# Patient Record
Sex: MALE | Race: OTHER | HISPANIC OR LATINO
[De-identification: names, ages, dates, MRNs, and addresses within clinical notes are randomized per-mention and may not be internally consistent; named-entity substitution may affect disease eponyms.]

---

## 2018-01-02 ENCOUNTER — APPOINTMENT (OUTPATIENT)
Dept: ORTHOPEDIC SURGERY | Facility: CLINIC | Age: 23
End: 2018-01-02
Payer: COMMERCIAL

## 2018-01-02 DIAGNOSIS — Z78.9 OTHER SPECIFIED HEALTH STATUS: ICD-10-CM

## 2018-01-02 DIAGNOSIS — Z87.09 PERSONAL HISTORY OF OTHER DISEASES OF THE RESPIRATORY SYSTEM: ICD-10-CM

## 2018-01-02 PROCEDURE — 99203 OFFICE O/P NEW LOW 30 MIN: CPT

## 2018-01-07 ENCOUNTER — FORM ENCOUNTER (OUTPATIENT)
Age: 23
End: 2018-01-07

## 2018-01-08 ENCOUNTER — APPOINTMENT (OUTPATIENT)
Dept: MRI IMAGING | Facility: CLINIC | Age: 23
End: 2018-01-08
Payer: COMMERCIAL

## 2018-01-08 ENCOUNTER — OUTPATIENT (OUTPATIENT)
Dept: OUTPATIENT SERVICES | Facility: HOSPITAL | Age: 23
LOS: 1 days | End: 2018-01-08
Payer: COMMERCIAL

## 2018-01-08 PROCEDURE — 73721 MRI JNT OF LWR EXTRE W/O DYE: CPT | Mod: 26,LT

## 2018-01-08 PROCEDURE — 73721 MRI JNT OF LWR EXTRE W/O DYE: CPT

## 2018-01-09 ENCOUNTER — APPOINTMENT (OUTPATIENT)
Dept: ORTHOPEDIC SURGERY | Facility: CLINIC | Age: 23
End: 2018-01-09
Payer: COMMERCIAL

## 2018-01-09 PROCEDURE — 99213 OFFICE O/P EST LOW 20 MIN: CPT

## 2018-01-09 RX ORDER — IBUPROFEN 200 MG/1
TABLET, COATED ORAL
Refills: 0 | Status: DISCONTINUED | COMMUNITY
End: 2018-01-09

## 2018-01-23 ENCOUNTER — APPOINTMENT (OUTPATIENT)
Dept: ORTHOPEDIC SURGERY | Facility: CLINIC | Age: 23
End: 2018-01-23
Payer: COMMERCIAL

## 2018-01-23 PROCEDURE — 99214 OFFICE O/P EST MOD 30 MIN: CPT

## 2018-02-06 ENCOUNTER — APPOINTMENT (OUTPATIENT)
Dept: ORTHOPEDIC SURGERY | Facility: CLINIC | Age: 23
End: 2018-02-06
Payer: COMMERCIAL

## 2018-02-06 PROCEDURE — 99213 OFFICE O/P EST LOW 20 MIN: CPT

## 2018-02-27 ENCOUNTER — APPOINTMENT (OUTPATIENT)
Dept: ORTHOPEDIC SURGERY | Facility: CLINIC | Age: 23
End: 2018-02-27
Payer: COMMERCIAL

## 2018-02-27 DIAGNOSIS — S83.412D SPRAIN OF MEDIAL COLLATERAL LIGAMENT OF LEFT KNEE, SUBSEQUENT ENCOUNTER: ICD-10-CM

## 2018-02-27 PROCEDURE — 99213 OFFICE O/P EST LOW 20 MIN: CPT

## 2018-04-02 ENCOUNTER — APPOINTMENT (OUTPATIENT)
Dept: ORTHOPEDIC SURGERY | Facility: CLINIC | Age: 23
End: 2018-04-02

## 2019-10-23 ENCOUNTER — EMERGENCY (EMERGENCY)
Facility: HOSPITAL | Age: 24
LOS: 1 days | Discharge: HOME | End: 2019-10-23
Attending: EMERGENCY MEDICINE | Admitting: EMERGENCY MEDICINE
Payer: SELF-PAY

## 2019-10-23 VITALS
HEART RATE: 84 BPM | DIASTOLIC BLOOD PRESSURE: 67 MMHG | TEMPERATURE: 98 F | RESPIRATION RATE: 16 BRPM | SYSTOLIC BLOOD PRESSURE: 124 MMHG | OXYGEN SATURATION: 100 %

## 2019-10-23 VITALS
SYSTOLIC BLOOD PRESSURE: 133 MMHG | RESPIRATION RATE: 18 BRPM | TEMPERATURE: 98 F | DIASTOLIC BLOOD PRESSURE: 75 MMHG | OXYGEN SATURATION: 100 % | HEART RATE: 100 BPM

## 2019-10-23 LAB
ALBUMIN SERPL ELPH-MCNC: 5 G/DL — SIGNIFICANT CHANGE UP (ref 3.3–5)
ALP SERPL-CCNC: 88 U/L — SIGNIFICANT CHANGE UP (ref 40–120)
ALT FLD-CCNC: 26 U/L — SIGNIFICANT CHANGE UP (ref 4–41)
ANION GAP SERPL CALC-SCNC: 14 MMO/L — SIGNIFICANT CHANGE UP (ref 7–14)
AST SERPL-CCNC: 20 U/L — SIGNIFICANT CHANGE UP (ref 4–40)
BASOPHILS # BLD AUTO: 0.03 K/UL — SIGNIFICANT CHANGE UP (ref 0–0.2)
BASOPHILS NFR BLD AUTO: 0.3 % — SIGNIFICANT CHANGE UP (ref 0–2)
BILIRUB SERPL-MCNC: 0.6 MG/DL — SIGNIFICANT CHANGE UP (ref 0.2–1.2)
BLD GP AB SCN SERPL QL: NEGATIVE — SIGNIFICANT CHANGE UP
BUN SERPL-MCNC: 14 MG/DL — SIGNIFICANT CHANGE UP (ref 7–23)
CALCIUM SERPL-MCNC: 10.2 MG/DL — SIGNIFICANT CHANGE UP (ref 8.4–10.5)
CHLORIDE SERPL-SCNC: 99 MMOL/L — SIGNIFICANT CHANGE UP (ref 98–107)
CO2 SERPL-SCNC: 26 MMOL/L — SIGNIFICANT CHANGE UP (ref 22–31)
CREAT SERPL-MCNC: 0.81 MG/DL — SIGNIFICANT CHANGE UP (ref 0.5–1.3)
EOSINOPHIL # BLD AUTO: 0.18 K/UL — SIGNIFICANT CHANGE UP (ref 0–0.5)
EOSINOPHIL NFR BLD AUTO: 1.8 % — SIGNIFICANT CHANGE UP (ref 0–6)
GLUCOSE SERPL-MCNC: 107 MG/DL — HIGH (ref 70–99)
HCT VFR BLD CALC: 44.5 % — SIGNIFICANT CHANGE UP (ref 39–50)
HGB BLD-MCNC: 14.7 G/DL — SIGNIFICANT CHANGE UP (ref 13–17)
IMM GRANULOCYTES NFR BLD AUTO: 0.3 % — SIGNIFICANT CHANGE UP (ref 0–1.5)
INR BLD: 1.17 — SIGNIFICANT CHANGE UP (ref 0.88–1.17)
LYMPHOCYTES # BLD AUTO: 1.45 K/UL — SIGNIFICANT CHANGE UP (ref 1–3.3)
LYMPHOCYTES # BLD AUTO: 14.1 % — SIGNIFICANT CHANGE UP (ref 13–44)
MCHC RBC-ENTMCNC: 28.9 PG — SIGNIFICANT CHANGE UP (ref 27–34)
MCHC RBC-ENTMCNC: 33 % — SIGNIFICANT CHANGE UP (ref 32–36)
MCV RBC AUTO: 87.6 FL — SIGNIFICANT CHANGE UP (ref 80–100)
MONOCYTES # BLD AUTO: 0.78 K/UL — SIGNIFICANT CHANGE UP (ref 0–0.9)
MONOCYTES NFR BLD AUTO: 7.6 % — SIGNIFICANT CHANGE UP (ref 2–14)
NEUTROPHILS # BLD AUTO: 7.79 K/UL — HIGH (ref 1.8–7.4)
NEUTROPHILS NFR BLD AUTO: 75.9 % — SIGNIFICANT CHANGE UP (ref 43–77)
NRBC # FLD: 0 K/UL — SIGNIFICANT CHANGE UP (ref 0–0)
PLATELET # BLD AUTO: 271 K/UL — SIGNIFICANT CHANGE UP (ref 150–400)
PMV BLD: 10 FL — SIGNIFICANT CHANGE UP (ref 7–13)
POTASSIUM SERPL-MCNC: 4.2 MMOL/L — SIGNIFICANT CHANGE UP (ref 3.5–5.3)
POTASSIUM SERPL-SCNC: 4.2 MMOL/L — SIGNIFICANT CHANGE UP (ref 3.5–5.3)
PROT SERPL-MCNC: 8.8 G/DL — HIGH (ref 6–8.3)
PROTHROM AB SERPL-ACNC: 13.1 SEC — SIGNIFICANT CHANGE UP (ref 9.8–13.1)
RBC # BLD: 5.08 M/UL — SIGNIFICANT CHANGE UP (ref 4.2–5.8)
RBC # FLD: 12.4 % — SIGNIFICANT CHANGE UP (ref 10.3–14.5)
RH IG SCN BLD-IMP: POSITIVE — SIGNIFICANT CHANGE UP
SODIUM SERPL-SCNC: 139 MMOL/L — SIGNIFICANT CHANGE UP (ref 135–145)
WBC # BLD: 10.26 K/UL — SIGNIFICANT CHANGE UP (ref 3.8–10.5)
WBC # FLD AUTO: 10.26 K/UL — SIGNIFICANT CHANGE UP (ref 3.8–10.5)

## 2019-10-23 PROCEDURE — 99245 OFF/OP CONSLTJ NEW/EST HI 55: CPT

## 2019-10-23 PROCEDURE — 71275 CT ANGIOGRAPHY CHEST: CPT | Mod: 26

## 2019-10-23 PROCEDURE — 99218: CPT

## 2019-10-23 PROCEDURE — 71046 X-RAY EXAM CHEST 2 VIEWS: CPT | Mod: 26

## 2019-10-23 RX ORDER — CYCLOBENZAPRINE HYDROCHLORIDE 10 MG/1
1 TABLET, FILM COATED ORAL
Qty: 7 | Refills: 0
Start: 2019-10-23 | End: 2019-10-29

## 2019-10-23 RX ORDER — AZITHROMYCIN 500 MG/1
500 TABLET, FILM COATED ORAL ONCE
Refills: 0 | Status: COMPLETED | OUTPATIENT
Start: 2019-10-23 | End: 2019-10-23

## 2019-10-23 RX ORDER — ALBUTEROL 90 UG/1
1 AEROSOL, METERED ORAL ONCE
Refills: 0 | Status: DISCONTINUED | OUTPATIENT
Start: 2019-10-23 | End: 2019-10-23

## 2019-10-23 RX ORDER — ALBUTEROL 90 UG/1
2 AEROSOL, METERED ORAL
Qty: 1 | Refills: 0
Start: 2019-10-23 | End: 2019-11-21

## 2019-10-23 RX ORDER — ALBUTEROL 90 UG/1
2.5 AEROSOL, METERED ORAL ONCE
Refills: 0 | Status: COMPLETED | OUTPATIENT
Start: 2019-10-23 | End: 2019-10-23

## 2019-10-23 RX ORDER — KETOROLAC TROMETHAMINE 30 MG/ML
30 SYRINGE (ML) INJECTION ONCE
Refills: 0 | Status: DISCONTINUED | OUTPATIENT
Start: 2019-10-23 | End: 2019-10-23

## 2019-10-23 RX ORDER — SODIUM CHLORIDE 9 MG/ML
1000 INJECTION INTRAMUSCULAR; INTRAVENOUS; SUBCUTANEOUS
Refills: 0 | Status: DISCONTINUED | OUTPATIENT
Start: 2019-10-23 | End: 2019-10-28

## 2019-10-23 RX ORDER — KETOROLAC TROMETHAMINE 30 MG/ML
15 SYRINGE (ML) INJECTION ONCE
Refills: 0 | Status: DISCONTINUED | OUTPATIENT
Start: 2019-10-23 | End: 2019-10-28

## 2019-10-23 RX ORDER — CEFTRIAXONE 500 MG/1
1000 INJECTION, POWDER, FOR SOLUTION INTRAMUSCULAR; INTRAVENOUS ONCE
Refills: 0 | Status: COMPLETED | OUTPATIENT
Start: 2019-10-23 | End: 2019-10-23

## 2019-10-23 RX ADMIN — CEFTRIAXONE 100 MILLIGRAM(S): 500 INJECTION, POWDER, FOR SOLUTION INTRAMUSCULAR; INTRAVENOUS at 13:19

## 2019-10-23 RX ADMIN — CEFTRIAXONE 1000 MILLIGRAM(S): 500 INJECTION, POWDER, FOR SOLUTION INTRAMUSCULAR; INTRAVENOUS at 13:49

## 2019-10-23 RX ADMIN — Medication 100 MILLIGRAM(S): at 16:14

## 2019-10-23 RX ADMIN — Medication 30 MILLIGRAM(S): at 10:03

## 2019-10-23 RX ADMIN — ALBUTEROL 2.5 MILLIGRAM(S): 90 AEROSOL, METERED ORAL at 10:03

## 2019-10-23 RX ADMIN — SODIUM CHLORIDE 100 MILLILITER(S): 9 INJECTION INTRAMUSCULAR; INTRAVENOUS; SUBCUTANEOUS at 14:49

## 2019-10-23 RX ADMIN — Medication 30 MILLIGRAM(S): at 10:18

## 2019-10-23 RX ADMIN — AZITHROMYCIN 500 MILLIGRAM(S): 500 TABLET, FILM COATED ORAL at 14:55

## 2019-10-23 NOTE — ED CDU PROVIDER INITIAL DAY NOTE - ATTENDING CONTRIBUTION TO CARE
I, Jennifer Cabot, MD, have performed a history and physical exam of the patient and discussed their management with the ACP.  I reviewed the PA's note and agree with the documented findings and plan of care. My medical decision making and observations are found above.    Cabot: 24M with PMH of spont R PTX s/p multiple procedures in the distant past, here with 2 weeks of cough with yellow/pink sputum and 2d of L lower chest pain.  + subjective fever.  No recent travel or sick contacts.  In the ED, WBC was normal, CXR with possible artifact in the R midlung, CTA chest with no PE but with small area of GGO with central 5mm necrotizing area in the L lower lung periphery, concerning for infection.  Pulmonary was consulted, recs pending.  Ceftriaxone and azithro were given.  Clinda was added to cover for possible CA-MRSA PNA.  The patient will be admitted to the CDU for IV abx for presumed PNA, pulmonary consult, and monitoring. On exam, HDS, NAD, MMM, eyes clear, lungs CTAB, heart sounds normal, abd soft, NT, ND, no CVAT, LEs without edema, wwp, skin normal temperature and color, neuro: alert and oriented, no focal deficits.

## 2019-10-23 NOTE — ED PROVIDER NOTE - ATTENDING CONTRIBUTION TO CARE
Seen and examined, pt. with hx of ? mild bronchospasm and prev. recurrent PTX/blebs, always on R, c/o "tight" L back, rapidly worsened within 1 hr. and since then c/o L CP, pleuritic, SOB, no fever/chills, initially denied cough/phlegm but mother states pt. has had inc. cough x 2wks, pt. endorses taking occ. albuterol MDI w relief for this. Clear lungs, heart reg, abd soft, NT to palp, no edema, NT calves, back with mild reproducible pain paraspinally, NT spine, no CVAT.

## 2019-10-23 NOTE — CONSULT NOTE ADULT - SUBJECTIVE AND OBJECTIVE BOX
Chase Hannon  Internal Medicine, PGY-1  Pager: [facility]: [phone] |[facility]: [phone]    HPI:  24 year old M hx R-sided spontaneous PTX s/p pleurodesis ~10 years ago, presents w/ 2-day hx of L-sided lower thoracic pain worse with inspiration. He states the pain initially started as a muscle-like cramping on the left thorax that became worse with inspiration. Pt took Tramadol with some relief in pain, but is not 100% sure of the name of the medication. Pt came to the hospital because pain was consistent. Described as a sharp, 10/10 pain with no radiation, worse with movement and deep inspiration, alleviated by rest and shallow breathing, associated with cough with slightly blood-stained sputum (droplets). Pt describes initial pain similar to previous PTX pain, but the muscle-like cramp aspect is new. Pt took albuterol inhaler with no improvement in symptoms. No recent travel, no calf pain, no palpitations, no hemoptysis except as mentioned, no fever, chills, no recent sick contacts. Of note, pt works as  and believes he has had asbestos exposure, with intermittent mask availability, and may have strained his back during work.      PAST MEDICAL & SURGICAL HISTORY:  Pneumothorax: x 2 episodes 10 years ago  S/P thoracostomy tube placement      FAMILY HISTORY:  Mother's side: HTN    SOCIAL HISTORY:  Smoking: none  Vaping: none  No other drug use    Allergies    No Known Allergies    Intolerances        HOME MEDICATIONS:  Uses albuterol inhaler PRN       REVIEW OF SYSTEMS:  Constitutional: No fevers or chills. No weight loss. No fatigue or generalised malaise.  Eyes: No itching or discharge from the eyes  ENT: No ear pain. No ear discharge. No nasal congestion. No post nasal drip. No epistaxis. No throat pain. No sore throat. No difficulty swallowing.   CV: No chest pain. No palpitations. No lightheadedness or dizziness.   Resp: + pain-limited dyspnea on exertion. +blood-spotted sputum as described. No dyspnea at rest. No orthopnea. No wheezing. No cough. No stridor. No chest pain with respiration.  GI: No nausea. No vomiting. No diarrhea.  MSK: No joint pain or pain in any extremities  Integumentary: No skin lesions. No pedal edema.  Neurological: No gross motor weakness. No sensory changes.    [ ] All other systems negative  [ ] Unable to assess ROS because ________    OBJECTIVE:  ICU Vital Signs Last 24 Hrs  T(C): 36.8 (23 Oct 2019 13:29), Max: 36.9 (23 Oct 2019 09:00)  T(F): 98.2 (23 Oct 2019 13:29), Max: 98.4 (23 Oct 2019 09:00)  HR: 77 (23 Oct 2019 13:29) (77 - 100)  BP: 106/66 (23 Oct 2019 13:29) (106/66 - 133/75)  BP(mean): --  ABP: --  ABP(mean): --  RR: 14 (23 Oct 2019 13:29) (14 - 18)  SpO2: 98% (23 Oct 2019 13:29) (98% - 100%)        CAPILLARY BLOOD GLUCOSE          PHYSICAL EXAM:  General: Awake, alert, oriented X 3.   HEENT: Atraumatic, normocephalic.    Lymph Nodes: No palpable lymphadenopathy  Neck: supple   Respiratory: Normal chest expansion                    Clear to auscultation b/l, no wheezing  Cardiovascular: regular rate, tachycardic, no murmurs  Abdomen: Soft, non-tender, non-distended.  Extremities: Warm to touch. Peripheral pulse palpable. No pedal edema.   Skin: No rashes or skin lesions  Neurological: Non-focal  Psychiatry: Appropriate mood and affect.    HOSPITAL MEDICATIONS:  MEDICATIONS  (STANDING):  clindamycin IVPB 600 milliGRAM(s) IV Intermittent Once  sodium chloride 0.9%. 1000 milliLiter(s) (100 mL/Hr) IV Continuous <Continuous>    MEDICATIONS  (PRN):      LABS:                        14.7   10.26 )-----------( 271      ( 23 Oct 2019 09:04 )             44.5     10-23    139  |  99  |  14  ----------------------------<  107<H>  4.2   |  26  |  0.81    Ca    10.2      23 Oct 2019 09:04    TPro  8.8<H>  /  Alb  5.0  /  TBili  0.6  /  DBili  x   /  AST  20  /  ALT  26  /  AlkPhos  88  10-23    PT/INR - ( 23 Oct 2019 09:04 )   PT: 13.1 SEC;   INR: 1.17                    MICROBIOLOGY:     RADIOLOGY:  [ ] Reviewed and interpreted by me    < from: Xray Chest 2 Views PA/Lat (10.23.19 @ 09:26) >  Stable mild right apical pleuroparenchymal scarring changes from prior   pleurodesis for spontaneous pneumothorax.     EKG pad/snap casts a small nodular shadow in the medial mid lung in right   perihilar region. Repeat study with all external objects/pads removed   suggested to reassess.    Clear remaining visualized lungs. No pleural effusions or current   pneumothorax.    Cardiac and mediastinal silhouettes within normal limits.    Trachea midline.    Unremarkable osseous structures.    < end of copied text >    < from: CT Angio Chest w/ IV Cont (10.23.19 @ 12:09) >  IMPRESSION: No pulmonary arterial emboli.    Left lower lobe peripheral patchy consolidation with internal necrosis or   cavitation as described above may be of infectious etiology.    < end of copied text >    EKG:  -possible right axis deviation  -no evidence of right-heart strain Chase Hannon  Internal Medicine, PGY-1  Pager: [facility]: [phone] |[facility]: [phone]    HPI:  24 year old M hx R-sided spontaneous PTX s/p pleurodesis ~10 years ago, presents w/ 2-day hx of L-sided lower thoracic pain worse with inspiration. He states the pain initially started as a muscle-like cramping on the left thorax that became worse with inspiration. Pt took Tramadol with some relief in pain, but is not 100% sure of the name of the medication. Pt came to the hospital because pain was consistent. Described as a sharp, 10/10 pain with no radiation, worse with movement and deep inspiration, alleviated by rest and shallow breathing, associated with cough with slightly blood-stained sputum (droplets). Pt describes initial pain similar to previous PTX pain, but the muscle-like cramp aspect is new. Pt took albuterol inhaler with no improvement in symptoms. Pt endorses flu-like symptoms a few days prior to the onset of the current symptoms. +sick contacts.  Has a history of recurrent colds every winter. No recent travel, no calf pain, no palpitations, no hemoptysis except as mentioned, no fever, chills, no recent sick contacts. Of note, pt works as  and believes he has had asbestos exposure, with intermittent mask availability, and may have strained his back during work.     PAST MEDICAL & SURGICAL HISTORY:  Pneumothorax: x 2 episodes 10 years ago  S/P thoracostomy tube placement      FAMILY HISTORY:  Mother's side: HTN  Father: asthma    SOCIAL HISTORY:  EtOH: almost every weekend, liquor, states ~1/2 gallon  Smoking: no tobacco, occasional hookah use  Vaping: none  No other drug use  Sexually active, inconsistent condom use    Allergies    No Known Allergies    Intolerances        HOME MEDICATIONS:  Uses albuterol inhaler PRN       REVIEW OF SYSTEMS:  Constitutional: No fevers or chills. No weight loss. No fatigue or generalised malaise.  Eyes: No itching or discharge from the eyes  ENT: No ear pain. No ear discharge. No nasal congestion. No post nasal drip. No epistaxis. No throat pain. No sore throat. No difficulty swallowing.   CV: No chest pain. No palpitations. No lightheadedness or dizziness.   Resp: + pain-limited dyspnea on exertion. +blood-spotted sputum as described. No dyspnea at rest. No orthopnea. No wheezing. No cough. No stridor. No chest pain with respiration.  GI: No nausea. No vomiting. No diarrhea.  MSK: No joint pain or pain in any extremities  Integumentary: No skin lesions. No pedal edema.  Neurological: No gross motor weakness. No sensory changes.    [ ] All other systems negative  [ ] Unable to assess ROS because ________    OBJECTIVE:  ICU Vital Signs Last 24 Hrs  T(C): 36.8 (23 Oct 2019 13:29), Max: 36.9 (23 Oct 2019 09:00)  T(F): 98.2 (23 Oct 2019 13:29), Max: 98.4 (23 Oct 2019 09:00)  HR: 77 (23 Oct 2019 13:29) (77 - 100)  BP: 106/66 (23 Oct 2019 13:29) (106/66 - 133/75)  BP(mean): --  ABP: --  ABP(mean): --  RR: 14 (23 Oct 2019 13:29) (14 - 18)  SpO2: 98% (23 Oct 2019 13:29) (98% - 100%)        CAPILLARY BLOOD GLUCOSE          PHYSICAL EXAM:  General: Awake, alert, oriented X 3.   HEENT: Atraumatic, normocephalic.    Lymph Nodes: No palpable lymphadenopathy  Neck: supple   Respiratory: Normal chest expansion                    Clear to auscultation b/l, no wheezing  Cardiovascular: regular rate, tachycardic, no murmurs  Abdomen: Soft, non-tender, non-distended.  Extremities: Warm to touch. Peripheral pulse palpable. No pedal edema.   Skin: No rashes or skin lesions  Neurological: Non-focal  Psychiatry: Appropriate mood and affect.    HOSPITAL MEDICATIONS:  MEDICATIONS  (STANDING):  clindamycin IVPB 600 milliGRAM(s) IV Intermittent Once  sodium chloride 0.9%. 1000 milliLiter(s) (100 mL/Hr) IV Continuous <Continuous>    MEDICATIONS  (PRN):      LABS:                        14.7   10.26 )-----------( 271      ( 23 Oct 2019 09:04 )             44.5     10-23    139  |  99  |  14  ----------------------------<  107<H>  4.2   |  26  |  0.81    Ca    10.2      23 Oct 2019 09:04    TPro  8.8<H>  /  Alb  5.0  /  TBili  0.6  /  DBili  x   /  AST  20  /  ALT  26  /  AlkPhos  88  10-23    PT/INR - ( 23 Oct 2019 09:04 )   PT: 13.1 SEC;   INR: 1.17                    MICROBIOLOGY:     RADIOLOGY:  [ ] Reviewed and interpreted by me    < from: Xray Chest 2 Views PA/Lat (10.23.19 @ 09:26) >  Stable mild right apical pleuroparenchymal scarring changes from prior   pleurodesis for spontaneous pneumothorax.     EKG pad/snap casts a small nodular shadow in the medial mid lung in right   perihilar region. Repeat study with all external objects/pads removed   suggested to reassess.    Clear remaining visualized lungs. No pleural effusions or current   pneumothorax.    Cardiac and mediastinal silhouettes within normal limits.    Trachea midline.    Unremarkable osseous structures.    < end of copied text >    < from: CT Angio Chest w/ IV Cont (10.23.19 @ 12:09) >  IMPRESSION: No pulmonary arterial emboli.    Left lower lobe peripheral patchy consolidation with internal necrosis or   cavitation as described above may be of infectious etiology.    < end of copied text >    EKG:  -possible right axis deviation  -no evidence of right-heart strain Chase Hannon  Internal Medicine, PGY-1  Pager: [facility]: [phone] |[facility]: [phone]    HPI:  24 year old M hx R-sided spontaneous PTX s/p VATS pleurodesis ~10 years ago, presents w/ 2-day hx of L-sided lower thoracic pain worse with inspiration. He states the pain initially started as a muscle-like cramping on the left thorax that became worse with inspiration. Pt took Tramadol with some relief in pain, but is not 100% sure of the name of the medication. Pt came to the hospital because pain was consistent. Described as a sharp, 10/10 pain with no radiation, worse with movement and deep inspiration, alleviated by rest and shallow breathing, associated with cough with slightly blood-stained sputum (droplets). Pt describes initial pain similar to previous PTX pain, but the muscle-like cramp aspect is new. Pt took albuterol inhaler with no improvement in symptoms. Pt endorses flu-like symptoms a few days prior to the onset of the current symptoms. +sick contacts.  Has a history of recurrent colds every winter. No recent travel, no calf pain, no palpitations, no hemoptysis except as mentioned, no fever, chills, no recent sick contacts. Of note, pt works as  and believes he has had asbestos exposure, with intermittent mask availability, and may have strained his back during work. Born in . Travelled to [place] one year prior. No recurrent infections. Never been tested for HIV but currently has one long-term partner.     PAST MEDICAL & SURGICAL HISTORY:  Pneumothorax: x 2 episodes 10 years ago  S/P thoracostomy tube placement      FAMILY HISTORY:  Mother's side: HTN  Father: asthma    SOCIAL HISTORY:  EtOH: almost every weekend, "dark" liquor, states ~1/2 gallon  Smoking: no tobacco, occasional hookah use  Vaping: none  No other drug use  Sexually active - 1 partner, inconsistent condom use    Allergies    No Known Allergies    Intolerances        HOME MEDICATIONS:  Uses albuterol inhaler PRN       REVIEW OF SYSTEMS:  Constitutional: No fevers or chills. No weight loss. No fatigue or generalised malaise.  Eyes: No itching or discharge from the eyes  ENT: No ear pain. No ear discharge. No nasal congestion. No post nasal drip. No epistaxis. No throat pain. No sore throat. No difficulty swallowing.   CV: No chest pain. No palpitations. No lightheadedness or dizziness.   Resp: + pain-limited dyspnea on exertion. +blood-spotted sputum as described. No dyspnea at rest. No orthopnea. No wheezing. No cough. No stridor. No chest pain with respiration.  GI: No nausea. No vomiting. No diarrhea.  MSK: No joint pain or pain in any extremities  Integumentary: No skin lesions. No pedal edema.  Neurological: No gross motor weakness. No sensory changes.    [ ] All other systems negative  [ ] Unable to assess ROS because ________    OBJECTIVE:  ICU Vital Signs Last 24 Hrs  T(C): 36.8 (23 Oct 2019 13:29), Max: 36.9 (23 Oct 2019 09:00)  T(F): 98.2 (23 Oct 2019 13:29), Max: 98.4 (23 Oct 2019 09:00)  HR: 77 (23 Oct 2019 13:29) (77 - 100)  BP: 106/66 (23 Oct 2019 13:29) (106/66 - 133/75)  BP(mean): --  ABP: --  ABP(mean): --  RR: 14 (23 Oct 2019 13:29) (14 - 18)  SpO2: 98% (23 Oct 2019 13:29) (98% - 100%)        CAPILLARY BLOOD GLUCOSE          PHYSICAL EXAM:  General: Awake, alert, oriented X 3.   HEENT: Atraumatic, normocephalic.    Lymph Nodes: No palpable lymphadenopathy  Neck: supple   Respiratory: Normal chest expansion                    Clear to auscultation b/l, no wheezing  Cardiovascular: regular rate, tachycardic, no murmurs  Abdomen: Soft, non-tender, non-distended.  Extremities: Warm to touch. Peripheral pulse palpable. No pedal edema.   Skin: No rashes or skin lesions  Neurological: Non-focal  Psychiatry: Appropriate mood and affect.    HOSPITAL MEDICATIONS:  MEDICATIONS  (STANDING):  clindamycin IVPB 600 milliGRAM(s) IV Intermittent Once  sodium chloride 0.9%. 1000 milliLiter(s) (100 mL/Hr) IV Continuous <Continuous>    MEDICATIONS  (PRN):      LABS:                        14.7   10.26 )-----------( 271      ( 23 Oct 2019 09:04 )             44.5     10-23    139  |  99  |  14  ----------------------------<  107<H>  4.2   |  26  |  0.81    Ca    10.2      23 Oct 2019 09:04    TPro  8.8<H>  /  Alb  5.0  /  TBili  0.6  /  DBili  x   /  AST  20  /  ALT  26  /  AlkPhos  88  10-23    PT/INR - ( 23 Oct 2019 09:04 )   PT: 13.1 SEC;   INR: 1.17                    MICROBIOLOGY:     RADIOLOGY:  [ ] Reviewed and interpreted by me    < from: Xray Chest 2 Views PA/Lat (10.23.19 @ 09:26) >  Stable mild right apical pleuroparenchymal scarring changes from prior   pleurodesis for spontaneous pneumothorax.     EKG pad/snap casts a small nodular shadow in the medial mid lung in right   perihilar region. Repeat study with all external objects/pads removed   suggested to reassess.    Clear remaining visualized lungs. No pleural effusions or current   pneumothorax.    Cardiac and mediastinal silhouettes within normal limits.    Trachea midline.    Unremarkable osseous structures.    < end of copied text >    < from: CT Angio Chest w/ IV Cont (10.23.19 @ 12:09) >  IMPRESSION: No pulmonary arterial emboli.    Left lower lobe peripheral patchy consolidation with internal necrosis or   cavitation as described above may be of infectious etiology.    < end of copied text >    EKG:  -possible right axis deviation  -no evidence of right-heart strain

## 2019-10-23 NOTE — ED ADULT NURSE NOTE - OBJECTIVE STATEMENT
Pt received to room 2 c/o L sided pain that is worse on inspiration.  Pt a&o 3 and ambulatory.  Pt states pain and SOB started on Monday and progressively got worse.  Pt breathing even and unlabored with breath sounds clear bilaterally, slightly diminished on L side mid to lower lung. Pt denies fever, chills, urinary frequency. No CVA tenderness. Pt received to room 2 c/o L sided pain that is worse on inspiration.  Pt a&o 3 and ambulatory.  Pt states pain and SOB started on Monday and progressively got worse.  Pt breathing even and unlabored with breath sounds clear bilaterally, slightly diminished on L side mid to lower lung. S1 and S2 auscultated. No CVA tenderness.  Pt denies fever, chills, urinary frequency, and hematuria. PMH of R sided pneumothorax x2.  Vital signs as noted, 20G IV R AC, labs drawn and sent as per MD.  Call bell in reach, comfort measures provided, will continue to monitor for safety and comfort.

## 2019-10-23 NOTE — ED CDU PROVIDER DISPOSITION NOTE - ATTENDING CONTRIBUTION TO CARE
I, Jennifer Cabot, MD, have performed a history and physical exam of the patient and discussed their management with the ACP.  I reviewed the PA's note and agree with the documented findings and plan of care. My medical decision making and observations are found above.    Cabot: 24M with PMH of spont R PTX s/p multiple procedures in the distant past, here with 2 weeks of cough with yellow/pink sputum and 2d of L lower chest pain.  + subjective fever.  No recent travel or sick contacts.  In the ED, WBC was normal, CXR with possible artifact in the R midlung, CTA chest with no PE but with small area of GGO with central 5mm necrotizing area in the L lower lung periphery, concerning for infection.  Pulmonary was consulted, recs pending.  Ceftriaxone and azithro were given.  Clinda was added to cover for possible CA-MRSA PNA.  The patient was admitted to the CDU for IV abx for presumed PNA, pulmonary consult, and monitoring. Pulmonary came to the CDU and evaluated the patient.  They feel that this is a post-viral PNA and recommend sending a sputum culture and discharging the patient now on Augmentin.  The pulmonology attending will follow up the sputum culture and call in bactrim if it grows out MRSA.  On exam, HDS, NAD, MMM, eyes clear, lungs CTAB, heart sounds normal, abd soft, NT, ND, no CVAT, LEs without edema, wwp, skin normal temperature and color, neuro: alert and oriented, no focal deficits.  Will discharge the patient with Augmentin and albuterol.

## 2019-10-23 NOTE — ED CDU PROVIDER INITIAL DAY NOTE - PROGRESS NOTE DETAILS
Cabot: I spoke with the pulmonary attending, Dr. Patel, who feels that this is a post-viral pneumonia. She recommends sending a sputum culture, starting augmentin, and sending with albuterol.  Dr. Patel will follow up the sputum culture and start bactrim if the culture grows out MRSA.  He will see Dr. Patel in the office on Nov 6th at 230pm.

## 2019-10-23 NOTE — CONSULT NOTE ADULT - ATTENDING COMMENTS
24 year old healthy male who presents with acute pleuritic left sided back pain, found to have a left lower lobe pneumonia consolidation on CT chest.   Patient has a history of a pneumothorax s/p VATS pleurodesis 10 years prior in setting of possible trauma after basketball, though some blebs are seen in apices on prior and current imaging. Otherwise, he is healthy but had symptoms of a URI with cough, chills, sputum, sore throat 1 - 2 weeks before that lasted about a week. He started feeling better over the weekend but this Monday (2 days prior) starting feeling a stabbing pain that has gotten progressively worse. He does not have recurrent pneumonias, though he is prone to "colds" in the winter months. He occasionally takes his dad's Ventolin which helps "open" him up. Denies inhaled corticosteroid use.  He received Toradol in the ED which helped his symptoms. Clinical findings consistent with a post-viral pneumonia. He appears well clinically but has pleuritic pain, due to the location of the pneumonia. Can continue NSAIDS prn. Check sputum culture. Can discharge on Augmentin and follow up with me at [address] [place] [address] on 11/6 @ 2:30 PM [phone]. He will need short term repeat imaging to determine duration of antibiotics and ensure resolution.

## 2019-10-23 NOTE — ED CDU PROVIDER DISPOSITION NOTE - CLINICAL COURSE
Cabot: 24M with PMH of spont R PTX s/p multiple procedures in the distant past, here with 2 weeks of cough with yellow/pink sputum and 2d of L lower chest pain.  + subjective fever.  No recent travel or sick contacts.  In the ED, WBC was normal, CXR with possible artifact in the R midlung, CTA chest with no PE but with small area of GGO with central 5mm necrotizing area in the L lower lung periphery, concerning for infection.  Pulmonary was consulted, recs pending.  Ceftriaxone and azithro were given.  Clinda was added to cover for possible CA-MRSA PNA.  The patient was admitted to the CDU for IV abx for presumed PNA, pulmonary consult, and monitoring. Pulmonary came to the CDU and evaluated the patient.  They feel that this is a post-viral PNA and recommend sending a sputum culture and discharging the patient now on Augmentin.  The pulmonology attending will follow up the sputum culture and call in bactrim if it grows out MRSA.  On exam, HDS, NAD, MMM, eyes clear, lungs CTAB, heart sounds normal, abd soft, NT, ND, no CVAT, LEs without edema, wwp, skin normal temperature and color, neuro: alert and oriented, no focal deficits.  Will discharge the patient with Augmentin and albuterol.

## 2019-10-23 NOTE — ED ADULT NURSE REASSESSMENT NOTE - NS ED NURSE REASSESS COMMENT FT1
break RN: pt A+OX4. breathing even and unlabored. denies new/worsening symptoms. report given to CDU JASON Lindsey. awaits transport in nad with family at bedside.

## 2019-10-23 NOTE — CONSULT NOTE ADULT - ASSESSMENT
24 year old M hx spontaneous R-sided PTX requiring pleurodesis ~10 years ago, no other significant PMH, presents with 2-day hx of L-sided lower thoracic/axillary pleuritic pain with muscle-cramp quality. CT scan shows L-sided patchy consolidation.     Plan:  #Pleuritic chest pain  -d-dimer negative  -CTA negative for PE, PTX  -?lung abscess  -please check HIV, IgGs, ESR/CRP,   -sputum culture w/induction if needed  -alpha anti-trypsin   -RVP  - 24 year old M hx spontaneous R-sided PTX requiring pleurodesis ~10 years ago, no other significant PMH, presents with 2-day hx of L-sided lower thoracic/axillary pleuritic pain with muscle-cramp quality. CT scan shows L-sided patchy consolidation. Concern for post-viral pneumonia.     Plan:  #Pleuritic chest pain  -likely post-viral pneumonia based on hx, sx  -plan for 2 weeks of Augmentin   --f/u w/Dr. Patel on 11/06, already scheduled, will determine if longer course of antibiotics needed and plan for repeat CT in 4-6 weeks  -please prescribe Ventolin inhaler before discharge  -please give Toradol x1 for pain relief before discharge

## 2019-10-23 NOTE — ED CDU PROVIDER DISPOSITION NOTE - NSFOLLOWUPINSTRUCTIONS_ED_ALL_ED_FT
You were seen for a lung infection.  We would like you to take Augmentin (an antibiotic) twice daily for 5 days.  You will also have an albuterol inhaler for wheeze and shortness of breath.  You will follow up with the pulmonologist in her office on November 6th at 230pm.      The pulmonologist will call you about your sputum culture results before then if the antibiotics need to be changed.    Please take tylenol and motrin for pain or fever.      Come back to the ED if you develop high fevers, increased chest pain or sputum, or other concerns. You were seen for a lung infection.  We would like you to take Augmentin (an antibiotic) twice daily for 14 days.  You will also have an albuterol inhaler for wheeze and shortness of breath.  You will follow up with the pulmonologist in her office on November 6th at 230pm.      The pulmonologist will call you about your sputum culture results before then if the antibiotics need to be changed.  You will also have a repeat CT of the chest in 4 weeks.    Please take tylenol and motrin for pain or fever.      Come back to the ED if you develop high fevers, increased chest pain or sputum, or other concerns.

## 2019-10-23 NOTE — ED PROVIDER NOTE - CLINICAL SUMMARY MEDICAL DECISION MAKING FREE TEXT BOX
Jennifer: CXR to eval further for PTX.  Pt currently comfortable on r/a with no signs of dyspnea.  less likely PE given PERC neg (although borderline tachycardic and apparent RAD on ECG).  If no PTX, will ddidmer to further risk stratify for PE.  EKG does not indicate ACS-- I don't think troponin indicated.  If eval here does not reveal more urgent pathology, MSK pain also possible.

## 2019-10-23 NOTE — ED ADULT TRIAGE NOTE - CHIEF COMPLAINT QUOTE
Pt reports sob with left flank pain x 3 days. Pt states pain increases with deep breath. Pt does not look tachypneic. Pt with Hx of pneumothorax x 3

## 2019-10-23 NOTE — ED CDU PROVIDER INITIAL DAY NOTE - OBJECTIVE STATEMENT
Cabot: 24M with PMH of spont R PTX s/p multiple procedures in the distant past, here with 2 weeks of cough with yellow/pink sputum and 2d of L lower chest pain.  + subjective fever.  No recent travel or sick contacts.  In the ED, WBC was normal, CXR with possible artifact in the R midlung, CTA chest with no PE but with small area of GGO with central 5mm necrotizing area in the L lower lung periphery, concerning for infection.  Pulmonary was consulted, recs pending.  Ceftriaxone and azithro were given.  Clinda was added to cover for possible CA-MRSA PNA.  The patient will be admitted to the CDU for IV abx for presumed PNA, pulmonary consult, and monitoring. On exam, HDS, NAD, MMM, eyes clear, lungs CTAB, heart sounds normal, abd soft, NT, ND, no CVAT, LEs without edema, wwp, skin normal temperature and color, neuro: alert and oriented, no focal deficits.

## 2019-10-23 NOTE — ED CDU PROVIDER INITIAL DAY NOTE - MEDICAL DECISION MAKING DETAILS
Cabot: 24M with PMH of spont R PTX s/p multiple procedures in the distant past, here with 2 weeks of cough with yellow/pink sputum and 2d of L lower chest pain.  + subjective fever.  CTA chest shows no PE but + small area of GGO with central 5mm necrosis in the L lower lung periphery, concerning for infection.  Ceftriaxone, clinda, and azithro were given. Normal exam.  The patient will be admitted to the CDU for IV abx for presumed PNA, pulmonary consult, and monitoring.

## 2019-10-23 NOTE — ED CDU PROVIDER DISPOSITION NOTE - PATIENT PORTAL LINK FT
You can access the FollowMyHealth Patient Portal offered by [facility] by registering at the following website: [email or web address]. By joining [employer]’s FollowMyHealth portal, you will also be able to view your health information using other applications (apps) compatible with our system.

## 2019-10-23 NOTE — ED PROVIDER NOTE - OBJECTIVE STATEMENT
25 yo M PMH R sided spont PTX s/p mult surgeries apx 10 yrs ago, no other PMH p/w 2 days dyspnea and L axillary area pain that is worse with breathing and better with certain positions.  No pain elsewhere.  No acute cough or fever.  No prior blood clot, no recent surgery or sig injury, no hemop, no LE swelling, no steroid or hormone use.

## 2019-10-24 LAB
GRAM STN SPT: SIGNIFICANT CHANGE UP
SPECIMEN SOURCE: SIGNIFICANT CHANGE UP

## 2019-10-26 LAB — BACTERIA SPT RESP CULT: SIGNIFICANT CHANGE UP

## 2019-10-28 LAB
BACTERIA BLD CULT: SIGNIFICANT CHANGE UP
BACTERIA BLD CULT: SIGNIFICANT CHANGE UP

## 2019-11-06 ENCOUNTER — FORM ENCOUNTER (OUTPATIENT)
Age: 24
End: 2019-11-06

## 2019-11-06 ENCOUNTER — APPOINTMENT (OUTPATIENT)
Dept: PULMONOLOGY | Facility: CLINIC | Age: 24
End: 2019-11-06
Payer: COMMERCIAL

## 2019-11-06 VITALS
DIASTOLIC BLOOD PRESSURE: 85 MMHG | OXYGEN SATURATION: 97 % | BODY MASS INDEX: 25.62 KG/M2 | WEIGHT: 173 LBS | SYSTOLIC BLOOD PRESSURE: 120 MMHG | HEIGHT: 69 IN | RESPIRATION RATE: 15 BRPM | TEMPERATURE: 97.6 F | HEART RATE: 72 BPM

## 2019-11-06 DIAGNOSIS — J18.9 PNEUMONIA, UNSPECIFIED ORGANISM: ICD-10-CM

## 2019-11-06 DIAGNOSIS — J93.83 OTHER PNEUMOTHORAX: ICD-10-CM

## 2019-11-06 PROCEDURE — 99214 OFFICE O/P EST MOD 30 MIN: CPT

## 2019-11-07 ENCOUNTER — APPOINTMENT (OUTPATIENT)
Dept: RADIOLOGY | Facility: CLINIC | Age: 24
End: 2019-11-07
Payer: COMMERCIAL

## 2019-11-07 ENCOUNTER — OUTPATIENT (OUTPATIENT)
Dept: OUTPATIENT SERVICES | Facility: HOSPITAL | Age: 24
LOS: 1 days | End: 2019-11-07
Payer: COMMERCIAL

## 2019-11-07 DIAGNOSIS — Z00.8 ENCOUNTER FOR OTHER GENERAL EXAMINATION: ICD-10-CM

## 2019-11-07 PROCEDURE — 71046 X-RAY EXAM CHEST 2 VIEWS: CPT | Mod: 26

## 2019-11-07 PROCEDURE — 71046 X-RAY EXAM CHEST 2 VIEWS: CPT

## 2019-11-09 RX ORDER — ALBUTEROL SULFATE 90 UG/1
108 (90 BASE) AEROSOL, METERED RESPIRATORY (INHALATION)
Refills: 0 | Status: ACTIVE | COMMUNITY
Start: 2019-11-09

## 2019-12-02 ENCOUNTER — APPOINTMENT (OUTPATIENT)
Dept: CT IMAGING | Facility: CLINIC | Age: 24
End: 2019-12-02

## 2024-06-01 ENCOUNTER — EMERGENCY (EMERGENCY)
Facility: HOSPITAL | Age: 29
LOS: 1 days | Discharge: ANOTHER HOSPITAL | End: 2024-06-01
Attending: EMERGENCY MEDICINE
Payer: COMMERCIAL

## 2024-06-01 VITALS
TEMPERATURE: 98 F | SYSTOLIC BLOOD PRESSURE: 122 MMHG | HEIGHT: 69 IN | RESPIRATION RATE: 18 BRPM | WEIGHT: 166.89 LBS | DIASTOLIC BLOOD PRESSURE: 77 MMHG | OXYGEN SATURATION: 93 % | HEART RATE: 94 BPM

## 2024-06-01 VITALS
DIASTOLIC BLOOD PRESSURE: 58 MMHG | OXYGEN SATURATION: 95 % | TEMPERATURE: 98 F | SYSTOLIC BLOOD PRESSURE: 111 MMHG | HEART RATE: 108 BPM | RESPIRATION RATE: 18 BRPM

## 2024-06-01 LAB
ACETONE SERPL-MCNC: NEGATIVE — SIGNIFICANT CHANGE UP
ALBUMIN SERPL ELPH-MCNC: 4.3 G/DL — SIGNIFICANT CHANGE UP (ref 3.5–5)
ALP SERPL-CCNC: 122 U/L — HIGH (ref 40–120)
ALT FLD-CCNC: 37 U/L DA — SIGNIFICANT CHANGE UP (ref 10–60)
ANION GAP SERPL CALC-SCNC: 7 MMOL/L — SIGNIFICANT CHANGE UP (ref 5–17)
AST SERPL-CCNC: 40 U/L — SIGNIFICANT CHANGE UP (ref 10–40)
BILIRUB SERPL-MCNC: 1 MG/DL — SIGNIFICANT CHANGE UP (ref 0.2–1.2)
BUN SERPL-MCNC: 16 MG/DL — SIGNIFICANT CHANGE UP (ref 7–18)
CALCIUM SERPL-MCNC: 9.5 MG/DL — SIGNIFICANT CHANGE UP (ref 8.4–10.5)
CHLORIDE SERPL-SCNC: 106 MMOL/L — SIGNIFICANT CHANGE UP (ref 96–108)
CK SERPL-CCNC: 45 U/L — SIGNIFICANT CHANGE UP (ref 35–232)
CO2 SERPL-SCNC: 25 MMOL/L — SIGNIFICANT CHANGE UP (ref 22–31)
CREAT SERPL-MCNC: 1.13 MG/DL — SIGNIFICANT CHANGE UP (ref 0.5–1.3)
EGFR: 90 ML/MIN/1.73M2 — SIGNIFICANT CHANGE UP
GLUCOSE SERPL-MCNC: 117 MG/DL — HIGH (ref 70–99)
HCT VFR BLD CALC: 19.3 % — CRITICAL LOW (ref 39–50)
HCT VFR BLD CALC: 20.7 % — CRITICAL LOW (ref 39–50)
HGB BLD-MCNC: 7.3 G/DL — LOW (ref 13–17)
HGB BLD-MCNC: 7.9 G/DL — LOW (ref 13–17)
MAGNESIUM SERPL-MCNC: 2.3 MG/DL — SIGNIFICANT CHANGE UP (ref 1.6–2.6)
MCHC RBC-ENTMCNC: 34.6 PG — HIGH (ref 27–34)
MCHC RBC-ENTMCNC: 34.6 PG — HIGH (ref 27–34)
MCHC RBC-ENTMCNC: 37.8 GM/DL — HIGH (ref 32–36)
MCHC RBC-ENTMCNC: 38.2 GM/DL — HIGH (ref 32–36)
MCV RBC AUTO: 90.8 FL — SIGNIFICANT CHANGE UP (ref 80–100)
MCV RBC AUTO: 91.5 FL — SIGNIFICANT CHANGE UP (ref 80–100)
PLATELET # BLD AUTO: 165 K/UL — SIGNIFICANT CHANGE UP (ref 150–400)
POTASSIUM SERPL-MCNC: 4.1 MMOL/L — SIGNIFICANT CHANGE UP (ref 3.5–5.3)
POTASSIUM SERPL-SCNC: 4.1 MMOL/L — SIGNIFICANT CHANGE UP (ref 3.5–5.3)
PROT SERPL-MCNC: 7.9 G/DL — SIGNIFICANT CHANGE UP (ref 6–8.3)
RBC # BLD: 2.11 M/UL — LOW (ref 4.2–5.8)
RBC # BLD: 2.28 M/UL — LOW (ref 4.2–5.8)
RBC # FLD: 19.9 % — HIGH (ref 10.3–14.5)
RBC # FLD: 20 % — HIGH (ref 10.3–14.5)
SODIUM SERPL-SCNC: 138 MMOL/L — SIGNIFICANT CHANGE UP (ref 135–145)
WBC # BLD: 637.64 K/UL — CRITICAL HIGH (ref 3.8–10.5)
WBC # BLD: 659.06 K/UL — CRITICAL HIGH (ref 3.8–10.5)
WBC # FLD AUTO: 637.64 K/UL — CRITICAL HIGH (ref 3.8–10.5)
WBC # FLD AUTO: 659.06 K/UL — CRITICAL HIGH (ref 3.8–10.5)

## 2024-06-01 PROCEDURE — 96375 TX/PRO/DX INJ NEW DRUG ADDON: CPT

## 2024-06-01 PROCEDURE — 70450 CT HEAD/BRAIN W/O DYE: CPT | Mod: MC

## 2024-06-01 PROCEDURE — 85025 COMPLETE CBC W/AUTO DIFF WBC: CPT

## 2024-06-01 PROCEDURE — 83615 LACTATE (LD) (LDH) ENZYME: CPT

## 2024-06-01 PROCEDURE — 96374 THER/PROPH/DIAG INJ IV PUSH: CPT

## 2024-06-01 PROCEDURE — 99291 CRITICAL CARE FIRST HOUR: CPT

## 2024-06-01 PROCEDURE — 82009 KETONE BODYS QUAL: CPT

## 2024-06-01 PROCEDURE — 36415 COLL VENOUS BLD VENIPUNCTURE: CPT

## 2024-06-01 PROCEDURE — 70450 CT HEAD/BRAIN W/O DYE: CPT | Mod: 26,MC

## 2024-06-01 PROCEDURE — 82550 ASSAY OF CK (CPK): CPT

## 2024-06-01 PROCEDURE — 83735 ASSAY OF MAGNESIUM: CPT

## 2024-06-01 PROCEDURE — 80053 COMPREHEN METABOLIC PANEL: CPT

## 2024-06-01 PROCEDURE — 99291 CRITICAL CARE FIRST HOUR: CPT | Mod: 25

## 2024-06-01 RX ORDER — MECLIZINE HCL 12.5 MG
12.5 TABLET ORAL ONCE
Refills: 0 | Status: COMPLETED | OUTPATIENT
Start: 2024-06-01 | End: 2024-06-01

## 2024-06-01 RX ORDER — SODIUM CHLORIDE 9 MG/ML
2000 INJECTION INTRAMUSCULAR; INTRAVENOUS; SUBCUTANEOUS ONCE
Refills: 0 | Status: COMPLETED | OUTPATIENT
Start: 2024-06-01 | End: 2024-06-01

## 2024-06-01 RX ORDER — METOCLOPRAMIDE HCL 10 MG
10 TABLET ORAL ONCE
Refills: 0 | Status: COMPLETED | OUTPATIENT
Start: 2024-06-01 | End: 2024-06-01

## 2024-06-01 RX ORDER — ONDANSETRON 8 MG/1
4 TABLET, FILM COATED ORAL ONCE
Refills: 0 | Status: COMPLETED | OUTPATIENT
Start: 2024-06-01 | End: 2024-06-01

## 2024-06-01 RX ADMIN — Medication 104 MILLIGRAM(S): at 16:34

## 2024-06-01 RX ADMIN — ONDANSETRON 4 MILLIGRAM(S): 8 TABLET, FILM COATED ORAL at 19:01

## 2024-06-01 RX ADMIN — ONDANSETRON 4 MILLIGRAM(S): 8 TABLET, FILM COATED ORAL at 23:16

## 2024-06-01 RX ADMIN — Medication 12.5 MILLIGRAM(S): at 16:35

## 2024-06-01 RX ADMIN — SODIUM CHLORIDE 2000 MILLILITER(S): 9 INJECTION INTRAMUSCULAR; INTRAVENOUS; SUBCUTANEOUS at 16:34

## 2024-06-01 NOTE — ED PROVIDER NOTE - CLINICAL SUMMARY MEDICAL DECISION MAKING FREE TEXT BOX
29-year-old male with complaint of dizziness, vertigo with head movement, vomiting, concern for benign positional vertigo, will get labs, give medications and reassess

## 2024-06-01 NOTE — ED PROVIDER NOTE - PROGRESS NOTE DETAILS
labs explained to patient   patient with significant leukocytosis, abnormal blood cells appearance, concern for acute leukemia, patient will have to be transferred for leuko-pheresis and heme-onc evaluation  will get CT head to r/o thrombosis

## 2024-06-01 NOTE — ED ADULT NURSE NOTE - NS_NURSE_UNIT_LOCATION_ED_ALL_ED
Test Date:    2019-02-26               Test Time:    18:25:05

Technician:   MERCY                                    

                                                     

MEASUREMENT RESULTS:                                       

Intervals:                                           

Rate:         85                                     

KY:           124                                    

QRSD:         82                                     

QT:           360                                    

QTc:          428                                    

Axis:                                                

P:            54                                     

KY:           124                                    

QRS:          31                                     

T:            28                                     

                                                     

INTERPRETIVE STATEMENTS:                                       

                                                     

Normal sinus rhythm

Normal ECG

No previous ECG available for comparison



Electronically Signed On 02-27-19 06:55:58 CST by Gelacio Mejia
Medical

## 2024-06-01 NOTE — ED PROVIDER NOTE - OBJECTIVE STATEMENT
29-year-old male with history of spontaneous pneumothorax, asthma complaining of dizziness, vertigo with head movement since Thursday afternoon after using albuterol, associated with vomiting x 3 today, ataxia.  Patient denies recent ear infection, cold, fever

## 2024-06-01 NOTE — ED PROVIDER NOTE - SECONDARY DIAGNOSIS.
Gave patient depo provera injection. Patient tolerated well. Patient will return in 12 weeks.    REASON FOR CONSULTATION/CC: Acute metabolic encephalopathy      Consult at request of Alta Buchanan DO     PCP: Bhaskar Cain MD      No chief complaint on file. HISTORY OF PRESENT ILLNESS: Miri Lambert is a 68y.o. year old male admitted for AAA repair. Patient is currently in the CVU and has had protracted course of severe encephalopathy. During my visit patient is actually pretty stable and coherent. He is conversant mildly disoriented but pleasant. He is he has no complaints at this time. He states he has started to have bowel movements. He is happy that his mood is improving. Patient was apparently seen by psychiatry today restarted some of his home meds. Past Medical History:   Diagnosis Date    AAA (abdominal aortic aneurysm)     Anxiety     Depression     Hyperlipidemia     Hypertension     Hypothyroidism     Iliac artery stenosis, left ([address] 09/16/2021         Past Surgical History:   Procedure Laterality Date    ABDOMINAL AORTIC ANEURYSM REPAIR N/A 1/31/2023    DIRECT REPAIR OF INFRARENAL ABDOMINAL AORTIC ANEURYSM performed by Alta Buchanan DO at [place] 05/13/2021    EXAM UNDER ANESTHESIA, BOTOX INJECTION ANAL FISSURE performed by Chandrika Jacome MD at [address] [place] [address]  09/12/2014    Dr Lesley Mcclain / Patrick Mcmahan HISTORY  01/29/2015    neck cyst excision       Family Hx  family history includes Heart Attack in his mother; Heart Disease in his mother; High Cholesterol in his mother. He was adopted. Social Hx   reports that he has been smoking cigarettes. He has a 26.50 pack-year smoking history.  He has never used smokeless tobacco.    Scheduled Meds:   bisacodyl  10 mg Rectal Daily    LORazepam  0.5 mg Oral BID    [START ON 2/4/2023] escitalopram  20 mg Oral Daily    mirtazapine  30 mg Oral Nightly    metoprolol tartrate  25 mg Oral BID    gabapentin  300 mg Oral TID    amLODIPine  10 mg Oral Daily    amiodarone  400 mg Oral BID    Followed by    Mushtaq Dick ON 2/10/2023] amiodarone  200 mg Oral Daily    enoxaparin Sodium  30 mg SubCUTAneous Daily    famotidine (PEPCID) injection  10 mg IntraVENous BID    [START ON 2/4/2023] Levothyroxine Sodium  50 mcg IntraVENous Daily    sodium chloride flush  5-40 mL IntraVENous 2 times per day    nicotine  1 patch TransDERmal Daily       Continuous Infusions:   sodium chloride      dexmedetomidine 1.1 mcg/kg/hr (02/03/23 1230)    sodium chloride Stopped (02/02/23 1143)    phenylephrine (JAIME-SYNEPHRINE) 50 mg/250 mL infusion 10 mcg/min (02/01/23 0600)       PRN Meds:  QUEtiapine, polyvinyl alcohol-povidone, sodium chloride, HYDROmorphone **OR** HYDROmorphone, metoprolol, hydrALAZINE, perflutren lipid microspheres, phenol, sodium chloride flush, sodium chloride, ondansetron **OR** ondansetron    ALLERGIES:  Patient is allergic to lidocaine. REVIEW OF SYSTEMS:  Constitutional: Negative for fever  HENT: Negative for sore throat  Eyes: Negative for redness   Respiratory: Negative for dyspnea, cough  Cardiovascular: Negative for chest pain  Gastrointestinal: Negative for vomiting, diarrhea   Genitourinary: Negative for hematuria   Musculoskeletal: Negative for arthralgias   Skin: Negative for rash  Neurological: Negative for syncope  Hematological: Negative for adenopathy  Psychiatric/Behavorial: Negative for anxiety    Objective:   PHYSICAL EXAM:  Blood pressure 124/77, pulse 73, temperature 98.9 °F (37.2 °C), temperature source Axillary, resp. rate 26, height 5' 10\" (1.778 m), weight 207 lb 14.3 oz (94.3 kg), SpO2 95 %.'  Gen:  No acute distress. Eyes: PERRL. Anicteric sclera. No conjunctival injection. ENT: No discharge. Posterior oropharynx clear. External appearance of ears and nose normal.  Neck: Trachea midline. No mass   Resp:  No crackles. No wheezes. No rhonchi. No dullness on percussion. CV: Regular rate. Regular rhythm. No murmur or rub. No edema. GI: Soft, Non-tender. Non-distended. +BS  Skin: Warm, dry, w/o erythema. Lymph: No cervical or supraclavicular LAD. M/S: No cyanosis. No clubbing. Neuro:  no focal neurologic deficit. Moves all extremities  Psych: Awake and alert, Oriented . Mood stable. Data Reviewed:   LABS:  CBC:   Recent Labs     02/01/23  0400 02/02/23  0422 02/03/23  0404   WBC 11.7* 13.9* 11.7*   HGB 8.1* 7.4* 7.5*   HCT 25.3* 23.4* 23.2*   MCV 91.4 91.6 91.1   * 104* 86*     BMP:   Recent Labs     02/01/23  0400 02/02/23  0422 02/03/23  0404   * 148* 147*   K 4.5 3.7 4.1   * 113* 111*   CO2 23 23 22   BUN 24* 26* 28*   CREATININE 1.9* 1.5* 1.3     LIVER PROFILE: No results for input(s): AST, ALT, LIPASE, BILIDIR, BILITOT, ALKPHOS in the last 72 hours. Invalid input(s): AMYLASE,  ALB  PT/INR:   Recent Labs     01/31/23  1548 02/01/23  0400   PROTIME 17.3* 17.8*   INR 1.42* 1.47*     APTT:   Recent Labs     01/31/23  1548 02/01/23  0400   APTT 30.0 33.2     UA:No results for input(s): NITRITE, COLORU, PHUR, LABCAST, WBCUA, RBCUA, MUCUS, TRICHOMONAS, YEAST, BACTERIA, CLARITYU, SPECGRAV, LEUKOCYTESUR, UROBILINOGEN, BILIRUBINUR, BLOODU, GLUCOSEU, AMORPHOUS in the last 72 hours. Invalid input(s): [place]  Recent Labs     02/03/23  0915   PHART 7.466*   IGE3KUK 32.8*   PO2ART 72.9*            Radiology Review:  Pertinent images / reports were reviewed as a part of this visit. Access  CVC   Arterial          PICC             CVC        CVC Triple Lumen 01/31/23 (Active)   Central Line Being Utilized Yes 02/03/23 1000   Criteria for Appropriate Use Hemodynamically unstable, requiring monitoring lines, vasopressors, or volume resuscitation 02/03/23 1000   Site Assessment Dry; Intact 02/03/23 1000   Phlebitis Assessment No symptoms 02/03/23 1000   Infiltration Assessment 0 02/03/23 1000   Color/Movement/Sensation Capillary refill less than 3 sec 02/03/23 1000   Proximal Lumen Color/Status Infusing 02/03/23 1000   Medial Lumen Status Infusing 02/03/23 1000   Distal Lumen Color/Status Transduced 02/03/23 1000   Line Care Connections checked and tightened 02/03/23 1000   Alcohol Cap Used Yes 02/03/23 1000   Date of Last Dressing Change 01/31/23 02/03/23 1000   Dressing Type Transparent; Antimicrobial 02/03/23 1000   Dressing Status Dry; Intact; Old drainage noted 02/03/23 1000   Number of days: 3            Gao  Urinary Catheter 01/31/23 2 Way (Active)   Catheter Indications Perioperative use for selected surgical procedures 02/03/23 0800   Site Assessment No urethral drainage 02/03/23 0800   Urine Color [place] 02/03/23 1200   Urine Appearance Clear 02/03/23 0800   Collection Container Standard 02/03/23 0800   Securement Method Other (Comment) 02/03/23 0800   Catheter Care  Other (comment) 02/03/23 0800   Catheter Best Practices  Drainage tube clipped to bed;Catheter secured to thigh; Tamper seal intact; Bag below bladder;Bag not on floor; Lack of dependent loop in tubing;Drainage bag less than half full 02/03/23 0800   Status Draining;Patent 02/03/23 0800   Output (mL) 150 mL 02/03/23 1200   Number of days: 3     Chest X-Ray    Interpreted by: Me    View: Portable chest xray    Findings: Normal heart size, Normal lungs, Normal mediastinum      Assessment/Plan:     Acute metabolic encephalopathy  -Seems mostly clear during my visit though may be waxing and waning in nature  -Psychiatry following restarted home medications    Centrilobular emphysema  -Has have a history of smoking  -Not in exacerbation appears to be comfortable  -But will provide DuoNebs as needed       This note was transcribed using [id number] [employer]. Please disregard any translational errors.     Thank you for the consult    [address]  Pulmonary, Sleep and Critical Care Medicine Anemia

## 2024-06-01 NOTE — ED ADULT TRIAGE NOTE - WEIGHT METHOD
Speech-Language Pathology Treatment Note    Today's date: 2019  Patient name: Linda Beatty  : 2013  MRN: [id number]  Referring provider: Ran Stanford MD  Dx:   No diagnosis found  Medical History significant for:   Past Medical History:   Diagnosis Date    Congenital heart defect     Heart murmur     Lazy eye of left side     Tongue tied      Flowsheet:  Start Time: 0330  Stop Time: 0400  Total time in clinic (min): 30 minutes    Subjective: Patient arrived on time to today's session with his dakota and attended the session yanci  Very frequent head rolling was observed again during today's session and brought up to pt's dakota who confirmed that both her and his teacher have been noticing it too  She reports the rapid head rolling occurred within the past month  Pt's dakota reported pt had limb spasms a while ago during the night (per chart review incident occurred on ) and was concerned about seizure activity; it was determined on  that pt's EEG indicated no incidence of seizures  Head rolling appeared to occur after this incident  Pt's dakota will follow up with PCP regarding head rolling  Also per chart review, regarding his PDA closure procedure, "Isaura Menendez is scheduled for Pre-op and Clinical Visit with Dr Ventura Childers on 2019 at 12:00pm   Also, scheduled for his PDA Procedure with Dr Ventura Childers on 2019 time TBA "    Objective:  1  Pt will reduce the phonological process of fronting by producing back sounds (/k, g, ?, ?, ?/) in 80% of opportunities  July: CV /k/ 81% imitation, initial /k/ 40% mod-max, final VC 20% imitation imitated  August: initial /k/ 20% yanci to 77% min-mod cue  final /k/ yanci/imitated 100%  CV words imitated 50%  September: initial /k/ 77% mod cues  Initial /g/ imitated 2/2 mod cues  Initial /sh/ imitated 2/2 with mod cues   10/3: initial /sh/ pt able to produce /sh/ at beginning of word with mod-max cues in 82% of trials but still fronting (I e  "sh-tip" for "ship")  10/22: produced /k/ and /g/ fronting minimal pairs with 20% accuracy, self correct x1  10/31:  Initial /k/ @ 10% acc through imitation with no increase despite cues  Final /k/ @ 40% with imitation increased to 60% with additional cues  Initial /g/ @ 40% with imitation and no increase in acc with additional cues  Final /g/ @ 80% acc through imitation with increase to 100% with min cues for placement   /SH/ in isolation with model @ 0% acc  11/5: initial /k/ imitated @30% with max cues (1 accurate production and 2 production in broken words), final /k/ imitated 2/3 trials increasing to 3/3 with mod cues     2  Pt will reduce the phonological process of final consonant deletion by producing final sounds in words @ 80% acc  Il[place]  July:  43-80% yanci/imitation  August: final /k/ in words 100%, final /p, t, m, k/ @ 90% acc  In words and 75% in phrases  September: final /d/ @ 10% with MAX cues  Produce final soudns 75% imitation/yanci  With independent productions very frequently putting the wrong sound on the end of the word ( hat was hap) or adding additional vowel onto end of his words ( pop was pop-uh)  Produce  minimal pairs /k/ and /t/ 3/8 with max cues  Pt able to ID minimal pairs with 100% acc  10/3: Produced final sounds of target words in 5/7 trials in imitation and with mod cues  10/17 Produced final consonant of target words @100% imitated with min cue, produced the correct FC @52%  Produced /p for m/, /t for tch/, and had incorrect final /s/ productions  10/22: produced produced final consonants /m/ and /p/ from final consonant deletion minimal pairs in 100% of trials, self corrected x1  10/31:  During structured task FCD x4  11/5: FCD 6x, monitored last 10 minutes    Assessment: Pt worked very hard to produce /k/ in the initial position and had more success with /k/ in the final position   Pt with FCD during conversation and able to add final consonant when told that he "missed the final sound "    Plan:  Recommendations: Speech/Language Therapy  Frequency:2x weekly  Duration:Other 12 [id number]    Homework:  final /k/ words    Intervention Cycle:  Intrvention certification WCJ  Intervention certification NS:    Visit: Visit:  actual

## 2024-06-01 NOTE — ED ADULT NURSE NOTE - NSFALLUNIVINTERV_ED_ALL_ED
Bed/Stretcher in lowest position, wheels locked, appropriate side rails in place/Call bell, personal items and telephone in reach/Instruct patient to call for assistance before getting out of bed/chair/stretcher/Non-slip footwear applied when patient is off stretcher/[place] to call system/Physically safe environment - no spills, clutter or unnecessary equipment/Purposeful proactive rounding/Room/bathroom lighting operational, light cord in reach

## 2024-06-02 ENCOUNTER — RESULT REVIEW (OUTPATIENT)
Age: 29
End: 2024-06-02

## 2024-06-02 ENCOUNTER — INPATIENT (INPATIENT)
Facility: HOSPITAL | Age: 29
LOS: 5 days | Discharge: HOME | DRG: 842 | End: 2024-06-08
Attending: INTERNAL MEDICINE | Admitting: STUDENT IN AN ORGANIZED HEALTH CARE EDUCATION/TRAINING PROGRAM
Payer: COMMERCIAL

## 2024-06-02 VITALS
WEIGHT: 161.82 LBS | OXYGEN SATURATION: 93 % | DIASTOLIC BLOOD PRESSURE: 68 MMHG | RESPIRATION RATE: 18 BRPM | HEIGHT: 67.5 IN | HEART RATE: 102 BPM | SYSTOLIC BLOOD PRESSURE: 120 MMHG | TEMPERATURE: 100 F

## 2024-06-02 DIAGNOSIS — C92.10 CHRONIC MYELOID LEUKEMIA, BCR/ABL-POSITIVE, NOT HAVING ACHIEVED REMISSION: ICD-10-CM

## 2024-06-02 DIAGNOSIS — D72.829 ELEVATED WHITE BLOOD CELL COUNT, UNSPECIFIED: ICD-10-CM

## 2024-06-02 DIAGNOSIS — D64.9 ANEMIA, UNSPECIFIED: ICD-10-CM

## 2024-06-02 DIAGNOSIS — R42 DIZZINESS AND GIDDINESS: ICD-10-CM

## 2024-06-02 DIAGNOSIS — B99.9 UNSPECIFIED INFECTIOUS DISEASE: ICD-10-CM

## 2024-06-02 DIAGNOSIS — C95.00 ACUTE LEUKEMIA OF UNSPECIFIED CELL TYPE NOT HAVING ACHIEVED REMISSION: ICD-10-CM

## 2024-06-02 DIAGNOSIS — I51.7 CARDIOMEGALY: ICD-10-CM

## 2024-06-02 DIAGNOSIS — Z29.9 ENCOUNTER FOR PROPHYLACTIC MEASURES, UNSPECIFIED: ICD-10-CM

## 2024-06-02 LAB
A1C WITH ESTIMATED AVERAGE GLUCOSE RESULT: 5.7 % — HIGH (ref 4–5.6)
ADD ON TEST-SPECIMEN IN LAB: SIGNIFICANT CHANGE UP
ALBUMIN SERPL ELPH-MCNC: 4.3 G/DL — SIGNIFICANT CHANGE UP (ref 3.3–5)
ALBUMIN SERPL ELPH-MCNC: 4.5 G/DL — SIGNIFICANT CHANGE UP (ref 3.3–5)
ALP SERPL-CCNC: 128 U/L — HIGH (ref 40–120)
ALP SERPL-CCNC: 130 U/L — HIGH (ref 40–120)
ALT FLD-CCNC: 31 U/L — SIGNIFICANT CHANGE UP (ref 10–45)
ALT FLD-CCNC: 36 U/L — SIGNIFICANT CHANGE UP (ref 10–45)
ANION GAP SERPL CALC-SCNC: 14 MMOL/L — SIGNIFICANT CHANGE UP (ref 5–17)
ANION GAP SERPL CALC-SCNC: 18 MMOL/L — HIGH (ref 5–17)
ANISOCYTOSIS BLD QL: SLIGHT — SIGNIFICANT CHANGE UP
APPEARANCE UR: CLEAR — SIGNIFICANT CHANGE UP
APTT BLD: 40.1 SEC — HIGH (ref 24.5–35.6)
AST SERPL-CCNC: 37 U/L — SIGNIFICANT CHANGE UP (ref 10–40)
AST SERPL-CCNC: 39 U/L — SIGNIFICANT CHANGE UP (ref 10–40)
BASOPHILS # BLD AUTO: 0 K/UL — SIGNIFICANT CHANGE UP (ref 0–0.2)
BASOPHILS # BLD AUTO: 23.81 K/UL — HIGH (ref 0–0.2)
BASOPHILS NFR BLD AUTO: 0 % — SIGNIFICANT CHANGE UP (ref 0–2)
BASOPHILS NFR BLD AUTO: 3.7 % — HIGH (ref 0–2)
BILIRUB SERPL-MCNC: 0.9 MG/DL — SIGNIFICANT CHANGE UP (ref 0.2–1.2)
BILIRUB SERPL-MCNC: 0.9 MG/DL — SIGNIFICANT CHANGE UP (ref 0.2–1.2)
BILIRUB UR-MCNC: NEGATIVE — SIGNIFICANT CHANGE UP
BLD GP AB SCN SERPL QL: NEGATIVE — SIGNIFICANT CHANGE UP
BUN SERPL-MCNC: 11 MG/DL — SIGNIFICANT CHANGE UP (ref 7–23)
BUN SERPL-MCNC: 13 MG/DL — SIGNIFICANT CHANGE UP (ref 7–23)
CALCIUM SERPL-MCNC: 9.3 MG/DL — SIGNIFICANT CHANGE UP (ref 8.4–10.5)
CALCIUM SERPL-MCNC: 9.6 MG/DL — SIGNIFICANT CHANGE UP (ref 8.4–10.5)
CHLORIDE SERPL-SCNC: 104 MMOL/L — SIGNIFICANT CHANGE UP (ref 96–108)
CHLORIDE SERPL-SCNC: 104 MMOL/L — SIGNIFICANT CHANGE UP (ref 96–108)
CHOLEST SERPL-MCNC: 77 MG/DL — SIGNIFICANT CHANGE UP
CO2 SERPL-SCNC: 19 MMOL/L — LOW (ref 22–31)
CO2 SERPL-SCNC: 23 MMOL/L — SIGNIFICANT CHANGE UP (ref 22–31)
COLOR SPEC: YELLOW — SIGNIFICANT CHANGE UP
CREAT SERPL-MCNC: 1.06 MG/DL — SIGNIFICANT CHANGE UP (ref 0.5–1.3)
CREAT SERPL-MCNC: 1.19 MG/DL — SIGNIFICANT CHANGE UP (ref 0.5–1.3)
D DIMER BLD IA.RAPID-MCNC: 398 NG/ML DDU — HIGH
DIFF PNL FLD: NEGATIVE — SIGNIFICANT CHANGE UP
EGFR: 85 ML/MIN/1.73M2 — SIGNIFICANT CHANGE UP
EGFR: 97 ML/MIN/1.73M2 — SIGNIFICANT CHANGE UP
EOSINOPHIL # BLD AUTO: 13.72 K/UL — HIGH (ref 0–0.5)
EOSINOPHIL # BLD AUTO: 45.87 K/UL — HIGH (ref 0–0.5)
EOSINOPHIL NFR BLD AUTO: 2.1 % — SIGNIFICANT CHANGE UP (ref 0–6)
EOSINOPHIL NFR BLD AUTO: 7 % — HIGH (ref 0–6)
ESTIMATED AVERAGE GLUCOSE: 117 MG/DL — HIGH (ref 68–114)
FIBRINOGEN PPP-MCNC: 417 MG/DL — SIGNIFICANT CHANGE UP (ref 200–445)
GLUCOSE SERPL-MCNC: 112 MG/DL — HIGH (ref 70–99)
GLUCOSE SERPL-MCNC: 122 MG/DL — HIGH (ref 70–99)
GLUCOSE UR QL: NEGATIVE MG/DL — SIGNIFICANT CHANGE UP
HAPTOGLOB SERPL-MCNC: 101 MG/DL — SIGNIFICANT CHANGE UP (ref 34–200)
HAV IGM SER-ACNC: SIGNIFICANT CHANGE UP
HBV CORE IGM SER-ACNC: SIGNIFICANT CHANGE UP
HBV SURFACE AB SER-ACNC: <3 MIU/ML — LOW
HBV SURFACE AB SER-ACNC: SIGNIFICANT CHANGE UP
HBV SURFACE AG SER-ACNC: SIGNIFICANT CHANGE UP
HCT VFR BLD CALC: 21.8 % — LOW (ref 39–50)
HCT VFR BLD CALC: SIGNIFICANT CHANGE UP (ref 39–50)
HCV AB S/CO SERPL IA: 0.12 S/CO — SIGNIFICANT CHANGE UP (ref 0–0.99)
HCV AB SERPL-IMP: SIGNIFICANT CHANGE UP
HDLC SERPL-MCNC: 22 MG/DL — LOW
HGB BLD-MCNC: 7.3 G/DL — LOW (ref 13–17)
HGB BLD-MCNC: 8 G/DL — LOW (ref 13–17)
HIV 1+2 AB+HIV1 P24 AG SERPL QL IA: SIGNIFICANT CHANGE UP
IMM GRANULOCYTES NFR BLD AUTO: 45.3 % — HIGH (ref 0–0.9)
INR BLD: 1.41 RATIO — HIGH (ref 0.85–1.18)
KETONES UR-MCNC: NEGATIVE MG/DL — SIGNIFICANT CHANGE UP
LDH SERPL L TO P-CCNC: 1197 U/L — HIGH (ref 50–242)
LDH SERPL L TO P-CCNC: 1212 U/L — HIGH (ref 50–242)
LEUKOCYTE ESTERASE UR-ACNC: NEGATIVE — SIGNIFICANT CHANGE UP
LIPID PNL WITH DIRECT LDL SERPL: 21 MG/DL — SIGNIFICANT CHANGE UP
LYMPHOCYTES # BLD AUTO: 1 % — LOW (ref 13–44)
LYMPHOCYTES # BLD AUTO: 1.2 % — LOW (ref 13–44)
LYMPHOCYTES # BLD AUTO: 6.55 K/UL — HIGH (ref 1–3.3)
LYMPHOCYTES # BLD AUTO: 7.78 K/UL — HIGH (ref 1–3.3)
MACROCYTES BLD QL: SLIGHT — SIGNIFICANT CHANGE UP
MAGNESIUM SERPL-MCNC: 2.3 MG/DL — SIGNIFICANT CHANGE UP (ref 1.6–2.6)
MAGNESIUM SERPL-MCNC: 2.5 MG/DL — SIGNIFICANT CHANGE UP (ref 1.6–2.6)
MANUAL SMEAR VERIFICATION: SIGNIFICANT CHANGE UP
MCHC RBC-ENTMCNC: 34.3 PG — HIGH (ref 27–34)
MCHC RBC-ENTMCNC: 36.7 GM/DL — HIGH (ref 32–36)
MCHC RBC-ENTMCNC: SIGNIFICANT CHANGE UP (ref 27–34)
MCHC RBC-ENTMCNC: SIGNIFICANT CHANGE UP (ref 32–36)
MCV RBC AUTO: 93.6 FL — SIGNIFICANT CHANGE UP (ref 80–100)
MCV RBC AUTO: SIGNIFICANT CHANGE UP (ref 80–100)
METAMYELOCYTES # FLD: 10 % — HIGH (ref 0–0)
MONOCYTES # BLD AUTO: 14.93 K/UL — HIGH (ref 0–0.9)
MONOCYTES # BLD AUTO: 85.19 K/UL — HIGH (ref 0–0.9)
MONOCYTES NFR BLD AUTO: 13 % — SIGNIFICANT CHANGE UP (ref 2–14)
MONOCYTES NFR BLD AUTO: 2.3 % — SIGNIFICANT CHANGE UP (ref 2–14)
MYELOCYTES NFR BLD: 36 % — HIGH (ref 0–0)
NEUTROPHILS # BLD AUTO: 190.04 K/UL — HIGH (ref 1.8–7.4)
NEUTROPHILS # BLD AUTO: 293.19 K/UL — HIGH (ref 1.8–7.4)
NEUTROPHILS NFR BLD AUTO: 14 % — LOW (ref 43–77)
NEUTROPHILS NFR BLD AUTO: 45.4 % — SIGNIFICANT CHANGE UP (ref 43–77)
NEUTS BAND # BLD: 15 % — HIGH (ref 0–8)
NITRITE UR-MCNC: NEGATIVE — SIGNIFICANT CHANGE UP
NON HDL CHOLESTEROL: 55 MG/DL — SIGNIFICANT CHANGE UP
NRBC # BLD: 0 /100 WBCS — SIGNIFICANT CHANGE UP (ref 0–0)
NRBC # BLD: 1 /100 WBCS — HIGH (ref 0–0)
NRBC # BLD: 1 /100 WBCS — HIGH (ref 0–0)
PH UR: 6 — SIGNIFICANT CHANGE UP (ref 5–8)
PHOSPHATE SERPL-MCNC: 4.1 MG/DL — SIGNIFICANT CHANGE UP (ref 2.5–4.5)
PHOSPHATE SERPL-MCNC: 4.1 MG/DL — SIGNIFICANT CHANGE UP (ref 2.5–4.5)
PLAT MORPH BLD: NORMAL — SIGNIFICANT CHANGE UP
PLATELET # BLD AUTO: 152 K/UL — SIGNIFICANT CHANGE UP (ref 150–400)
PLATELET # BLD AUTO: 171 K/UL — SIGNIFICANT CHANGE UP (ref 150–400)
PLATELET # BLD AUTO: 202 K/UL — SIGNIFICANT CHANGE UP (ref 150–400)
POLYCHROMASIA BLD QL SMEAR: SLIGHT — SIGNIFICANT CHANGE UP
POTASSIUM SERPL-MCNC: 3.5 MMOL/L — SIGNIFICANT CHANGE UP (ref 3.5–5.3)
POTASSIUM SERPL-MCNC: 4.2 MMOL/L — SIGNIFICANT CHANGE UP (ref 3.5–5.3)
POTASSIUM SERPL-SCNC: 3.5 MMOL/L — SIGNIFICANT CHANGE UP (ref 3.5–5.3)
POTASSIUM SERPL-SCNC: 4.2 MMOL/L — SIGNIFICANT CHANGE UP (ref 3.5–5.3)
PROMYELOCYTES # FLD: 4 % — HIGH (ref 0–0)
PROT SERPL-MCNC: 7.3 G/DL — SIGNIFICANT CHANGE UP (ref 6–8.3)
PROT SERPL-MCNC: 7.6 G/DL — SIGNIFICANT CHANGE UP (ref 6–8.3)
PROT UR-MCNC: SIGNIFICANT CHANGE UP MG/DL
PROTHROM AB SERPL-ACNC: 14.7 SEC — HIGH (ref 9.5–13)
RBC # BLD: 2.16 M/UL — LOW (ref 4.2–5.8)
RBC # BLD: 2.33 M/UL — LOW (ref 4.2–5.8)
RBC # BLD: SIGNIFICANT CHANGE UP (ref 4.2–5.8)
RBC # FLD: 19.4 % — HIGH (ref 10.3–14.5)
RBC # FLD: SIGNIFICANT CHANGE UP (ref 10.3–14.5)
RBC BLD AUTO: ABNORMAL
RETICS #: 44.3 K/UL — SIGNIFICANT CHANGE UP (ref 25–125)
RETICS/RBC NFR: 2.1 % — SIGNIFICANT CHANGE UP (ref 0.5–2.5)
RH IG SCN BLD-IMP: POSITIVE — SIGNIFICANT CHANGE UP
SODIUM SERPL-SCNC: 141 MMOL/L — SIGNIFICANT CHANGE UP (ref 135–145)
SODIUM SERPL-SCNC: 141 MMOL/L — SIGNIFICANT CHANGE UP (ref 135–145)
SP GR SPEC: 1.02 — SIGNIFICANT CHANGE UP (ref 1–1.03)
TRIGL SERPL-MCNC: 211 MG/DL — HIGH
TSH SERPL-MCNC: 3.8 UIU/ML — SIGNIFICANT CHANGE UP (ref 0.27–4.2)
URATE SERPL-MCNC: 5.1 MG/DL — SIGNIFICANT CHANGE UP (ref 3.4–8.8)
URATE SERPL-MCNC: 8.4 MG/DL — SIGNIFICANT CHANGE UP (ref 3.4–8.8)
UROBILINOGEN FLD QL: 1 MG/DL — SIGNIFICANT CHANGE UP (ref 0.2–1)
WBC # BLD: 642.36 K/UL — CRITICAL HIGH (ref 3.8–10.5)
WBC # BLD: 655.32 K/UL — CRITICAL HIGH (ref 3.8–10.5)
WBC # FLD AUTO: 642.36 K/UL — CRITICAL HIGH (ref 3.8–10.5)
WBC # FLD AUTO: 655.32 K/UL — CRITICAL HIGH (ref 3.8–10.5)

## 2024-06-02 PROCEDURE — 99223 1ST HOSP IP/OBS HIGH 75: CPT

## 2024-06-02 PROCEDURE — 93010 ELECTROCARDIOGRAM REPORT: CPT

## 2024-06-02 PROCEDURE — 88189 FLOWCYTOMETRY/READ 16 & >: CPT

## 2024-06-02 PROCEDURE — 93356 MYOCRD STRAIN IMG SPCKL TRCK: CPT

## 2024-06-02 PROCEDURE — 88291 CYTO/MOLECULAR REPORT: CPT

## 2024-06-02 PROCEDURE — 85060 BLOOD SMEAR INTERPRETATION: CPT

## 2024-06-02 PROCEDURE — 71045 X-RAY EXAM CHEST 1 VIEW: CPT | Mod: 26

## 2024-06-02 PROCEDURE — 93306 TTE W/DOPPLER COMPLETE: CPT | Mod: 26

## 2024-06-02 PROCEDURE — G0452: CPT | Mod: 26,59

## 2024-06-02 PROCEDURE — 70551 MRI BRAIN STEM W/O DYE: CPT | Mod: 26

## 2024-06-02 RX ORDER — ACETAMINOPHEN 500 MG
650 TABLET ORAL ONCE
Refills: 0 | Status: COMPLETED | OUTPATIENT
Start: 2024-06-02 | End: 2024-06-02

## 2024-06-02 RX ORDER — ONDANSETRON 8 MG/1
4 TABLET, FILM COATED ORAL EVERY 8 HOURS
Refills: 0 | Status: DISCONTINUED | OUTPATIENT
Start: 2024-06-02 | End: 2024-06-08

## 2024-06-02 RX ORDER — SODIUM CHLORIDE 9 MG/ML
1000 INJECTION, SOLUTION INTRAVENOUS
Refills: 0 | Status: DISCONTINUED | OUTPATIENT
Start: 2024-06-02 | End: 2024-06-08

## 2024-06-02 RX ORDER — HYDROXYUREA 500 MG/1
2000 CAPSULE ORAL EVERY 12 HOURS
Refills: 0 | Status: DISCONTINUED | OUTPATIENT
Start: 2024-06-02 | End: 2024-06-08

## 2024-06-02 RX ORDER — ACETAMINOPHEN 500 MG
650 TABLET ORAL EVERY 6 HOURS
Refills: 0 | Status: DISCONTINUED | OUTPATIENT
Start: 2024-06-02 | End: 2024-06-08

## 2024-06-02 RX ORDER — LANOLIN ALCOHOL/MO/W.PET/CERES
3 CREAM (GRAM) TOPICAL AT BEDTIME
Refills: 0 | Status: DISCONTINUED | OUTPATIENT
Start: 2024-06-02 | End: 2024-06-08

## 2024-06-02 RX ORDER — HYDROXYUREA 500 MG/1
2000 CAPSULE ORAL ONCE
Refills: 0 | Status: COMPLETED | OUTPATIENT
Start: 2024-06-02 | End: 2024-06-02

## 2024-06-02 RX ORDER — ALLOPURINOL 300 MG
300 TABLET ORAL DAILY
Refills: 0 | Status: DISCONTINUED | OUTPATIENT
Start: 2024-06-02 | End: 2024-06-08

## 2024-06-02 RX ADMIN — SODIUM CHLORIDE 100 MILLILITER(S): 9 INJECTION, SOLUTION INTRAVENOUS at 14:18

## 2024-06-02 RX ADMIN — Medication 650 MILLIGRAM(S): at 01:59

## 2024-06-02 RX ADMIN — HYDROXYUREA 2000 MILLIGRAM(S): 500 CAPSULE ORAL at 21:45

## 2024-06-02 RX ADMIN — SODIUM CHLORIDE 100 MILLILITER(S): 9 INJECTION, SOLUTION INTRAVENOUS at 21:46

## 2024-06-02 RX ADMIN — HYDROXYUREA 2000 MILLIGRAM(S): 500 CAPSULE ORAL at 12:16

## 2024-06-02 RX ADMIN — Medication 300 MILLIGRAM(S): at 11:08

## 2024-06-02 RX ADMIN — Medication 650 MILLIGRAM(S): at 00:59

## 2024-06-02 RX ADMIN — SODIUM CHLORIDE 100 MILLILITER(S): 9 INJECTION, SOLUTION INTRAVENOUS at 04:53

## 2024-06-02 NOTE — PATIENT PROFILE ADULT - NSPROGENBLOODRESTRICT_GEN_A_NUR
blood borne infection concerns Pt. educated on importance of blood transfusions. Pt. states he is opposed to receiving blood products because they are "creepy"./blood borne infection concerns

## 2024-06-02 NOTE — PROGRESS NOTE ADULT - PROBLEM SELECTOR PLAN 4
6/2- Left Ventricle:  The left ventricular cavity is normal in size. Left ventricular systolic function is normal with a calculated ejection fraction of 67 % by the Harris's biplane method of disks. There are no regional wall motion abnormalities seen. There is normal left ventricular diastolic function. There is increased LV mass and eccentric hypertrophy. Left ventricular global longitudinal strain is -22.4 % which is normal (< -18%).  LV Wall Scoring: All segments are normal.   6/2- Call Card consult in am.

## 2024-06-02 NOTE — PROGRESS NOTE ADULT - NS ATTEND AMEND GEN_ALL_CORE FT
29M with PMH of spontaneous pneumothorax, who initially presented to [place] ED with dizziness and vertigo. Labs notable for profound leukocytosis (WBC > 600) and anemia.    Heme: CBC shows , Hgb 7.3, plt 202. WBC differential shows left-shift: 15% bands, 10% metamyelocytes, 36% myelocytes, 4% promyelocytes. No blasts reported. Suspicious for CML but will need BCR-ABL and BMBx to confirm diagnosis.  - Trend CBC with differential twice daily. Continue supportive transfusions as needed to maintain Hgb > 7 and plt > 10  (> 15 if febrile, > 50 if bleeding). Will give pRBCs today for symptomatic anemia and O2 requirement.  - Trend TLS labs (CMP, Phos, uric acid, LDH) twice daily. Start allopurinol. Consider rasburicase if uric acid > 8.   - Follow up peripheral blood flow and BCR-ABL  - Will need BMBx tomorrow to confirm diagnosis  - Start hydroxyurea 2 g BID for cytoreduction  - Follow up HIV and hepatitis serologies  - CTH negative. MRI brain scheduled given persistent dizziness.

## 2024-06-02 NOTE — H&P ADULT - ASSESSMENT
29M w/Hx of spontaneous pneumothorax presents as a transfer from [facility] due to significant leukocytosis (WBC >650) associated with dizziness, fever and weight loss.

## 2024-06-02 NOTE — H&P ADULT - NSHPPHYSICALEXAM_GEN_ALL_CORE
T(C): 37.1 (06-02-24 @ 05:29), Max: 38 (06-02-24 @ 00:10)  HR: 107 (06-02-24 @ 05:29) (94 - 113)  BP: 114/69 (06-02-24 @ 05:29) (111/58 - 122/77)  RR: 18 (06-02-24 @ 05:29) (18 - 18)  SpO2: 90% (06-02-24 @ 05:29) (90% - 95%)    CONSTITUTIONAL: No apparent distress  EYES: PERRLA and symmetric, EOMI, No conjunctival or scleral injection, non-icteric  ENMT: Oral mucosa with moist membranes.  NECK: Supple, symmetric. No JVD.  RESP: No respiratory distress, no use of accessory muscles; CTA b/l, no WRR  CV: Tachycardic, +S1S2, no MRG  GI: Soft, NT, ND, no rebound, no guarding  : No suprapubic tenderness. No CVA tenderness.  LYMPH: No cervical LAD or tenderness  MSK: No spinal tenderness, normal muscle strength/tone  EXTREMITIES: No pedal edema  SKIN: No rashes or ulcers noted  NEURO: A+Ox3, responsive. No tremor, sensation intact in upper and lower extremities b/l  PSYCH: Appropriate insight/judgment; mood and affect appropriate, recent/remote memory intact

## 2024-06-02 NOTE — H&P ADULT - PROBLEM SELECTOR PLAN 2
Hgb of 7.9>7.3  - Likely 2/2 to an underlying malignancy  - Patient currently refusing transfusion, but discussed with patient who said he would consider  - Type and screen ordered  - F/u CBC  - If hgb <7, recommend transfusion if patient consents

## 2024-06-02 NOTE — PROGRESS NOTE ADULT - PROBLEM SELECTOR PLAN 1
R/O leukemia  Transferred form  CBC shows leukocytosis  Monitor CBC with diff.  6/2- CBC shows  left-shift: 15% bands, 10% metamyelocytes, 36% myelocytes.  Transfuse one unit PRBC today, (2/2 dizziness)  Start Hydrea 2 gm Q 12 hrs  Monitor tumor lysis labs BID,   6/2- Flow cyto sent, f/u results (BCR ABL)  CT head negative, ordered MR head to r/o infarct/lesion as dizziness is persisting.  Start allopurinol. Consider rasburicase if uric acid > 8.   Will need BM Bx tomorrow to confirm diagnosis

## 2024-06-02 NOTE — PROGRESS NOTE ADULT - SUBJECTIVE AND OBJECTIVE BOX
Diagnosis:    Protocol/Chemo Regimen:    Day:     Pt endorsed:    Review of Systems:     Pain scale:     Diet:     Allergies    No Known Allergies    Intolerances        ANTIMICROBIALS      HEME/ONC MEDICATIONS      STANDING MEDICATIONS  lactated ringers. 1000 milliLiter(s) IV Continuous <Continuous>      PRN MEDICATIONS  acetaminophen     Tablet .. 650 milliGRAM(s) Oral every 6 hours PRN  aluminum hydroxide/magnesium hydroxide/simethicone Suspension 30 milliLiter(s) Oral every 4 hours PRN  melatonin 3 milliGRAM(s) Oral at bedtime PRN  ondansetron Injectable 4 milliGRAM(s) IV Push every 8 hours PRN        Vital Signs Last 24 Hrs  T(C): 37.1 (02 Jun 2024 05:29), Max: 38 (02 Jun 2024 00:10)  T(F): 98.8 (02 Jun 2024 05:29), Max: 100.4 (02 Jun 2024 00:10)  HR: 107 (02 Jun 2024 05:29) (94 - 113)  BP: 114/69 (02 Jun 2024 05:29) (111/58 - 122/77)  BP(mean): --  RR: 18 (02 Jun 2024 05:29) (18 - 18)  SpO2: 90% (02 Jun 2024 05:29) (90% - 95%)    Parameters below as of 02 Jun 2024 05:29  Patient On (Oxygen Delivery Method): nasal cannula  O2 Flow (L/min): 2      PHYSICAL EXAM  General: NAD  HEENT: PERRLA, EOMOI, clear oropharynx, anicteric sclera, pink conjunctiva  Neck: supple  CV: (+) S1/S2 RRR  Lungs: clear to auscultation, no wheezes or rales  Abdomen: soft, non-tender, non-distended (+) BS  Ext: no clubbing, cyanosis or edema  Skin: no rashes and no petechiae  Neuro: alert and oriented X 3, no focal deficits  Central Line:     RECENT CULTURES:        LABS:                        7.3    655.32 )-----------( 202      ( 02 Jun 2024 05:10 )             See note        Mean Cell Volume : See note  Mean Cell Hemoglobin : See note  Mean Cell Hemoglobin Concentration : See note  Auto Neutrophil # : x  Auto Lymphocyte # : x  Auto Monocyte # : x  Auto Eosinophil # : x  Auto Basophil # : x  Auto Neutrophil % : x  Auto Lymphocyte % : x  Auto Monocyte % : x  Auto Eosinophil % : x  Auto Basophil % : x      06-02    141  |  104  |  13  ----------------------------<  122<H>  3.5   |  19<L>  |  1.19    Ca    9.3      02 Jun 2024 05:10  Phos  4.1     06-02  Mg     2.5     06-02    TPro  7.3  /  Alb  4.3  /  TBili  0.9  /  DBili  x   /  AST  39  /  ALT  31  /  AlkPhos  128<H>  06-02      Mg 2.5  Phos 4.1  Mg 2.3  Phos --          LDH 1197  Uric Acid 8.4    LDH 1751  Uric Acid --        RADIOLOGY & ADDITIONAL STUDIES:         Diagnosis: R/O Leukemia    Protocol/Chemo Regimen: TBD    Pt endorsed:  +dizzy  +anxious    Review of Systems:   Denies any nausea, vomiting, diarrhea, chest pain, abdominal pain.    Pain scale: Denies    Diet: Regular    Allergies: No Known Allergies    STANDING MEDICATIONS  lactated ringers. 1000 milliLiter(s) IV Continuous <Continuous>    PRN MEDICATIONS  acetaminophen     Tablet .. 650 milliGRAM(s) Oral every 6 hours PRN  aluminum hydroxide/magnesium hydroxide/simethicone Suspension 30 milliLiter(s) Oral every 4 hours PRN  melatonin 3 milliGRAM(s) Oral at bedtime PRN  ondansetron Injectable 4 milliGRAM(s) IV Push every 8 hours PRN    Vital Signs Last 24 Hrs  T(C): 37.1 (02 Jun 2024 05:29), Max: 38 (02 Jun 2024 00:10)  T(F): 98.8 (02 Jun 2024 05:29), Max: 100.4 (02 Jun 2024 00:10)  HR: 107 (02 Jun 2024 05:29) (94 - 113)  BP: 114/69 (02 Jun 2024 05:29) (111/58 - 122/77)  BP(mean): --  RR: 18 (02 Jun 2024 05:29) (18 - 18)  SpO2: 90% (02 Jun 2024 05:29) (90% - 95%)    Parameters below as of 02 Jun 2024 05:29  Patient On (Oxygen Delivery Method): nasal cannula  O2 Flow (L/min): 2    PHYSICAL EXAM  General: NAD, resting in bed.  HEENT: Clear oropharynx, anicteric sclera, pink conjunctiva  Neck: supple  CV: (+) S1/S2 RRR  Lungs: clear to auscultation, no wheezes or rales  Abdomen: soft, non-tender, non-distended (+) BS  Ext: no clubbing, cyanosis or edema  Skin: no rashes and no petechiae  Neuro: alert and oriented X 3, no focal deficits  Central Line: PIVL    RECENT CULTURES:  6/1- Pending    LABS:                        7.3    655.32 )-----------( 202      ( 02 Jun 2024 05:10 )             See note        Mean Cell Volume : See note  Mean Cell Hemoglobin : See note  Mean Cell Hemoglobin Concentration : See note  Auto Neutrophil # : x  Auto Lymphocyte # : x  Auto Monocyte # : x  Auto Eosinophil # : x  Auto Basophil # : x  Auto Neutrophil % : x  Auto Lymphocyte % : x  Auto Monocyte % : x  Auto Eosinophil % : x  Auto Basophil % : x      06-02    141  |  104  |  13  ----------------------------<  122<H>  3.5   |  19<L>  |  1.19    Ca    9.3      02 Jun 2024 05:10  Phos  4.1     06-02  Mg     2.5     06-02    TPro  7.3  /  Alb  4.3  /  TBili  0.9  /  DBili  x   /  AST  39  /  ALT  31  /  AlkPhos  128<H>  06-02    Mg 2.5  Phos 4.1  Mg 2.3  Phos --    LDH 1197  Uric Acid 8.4    LDH 1751  Uric Acid --    RADIOLOGY & ADDITIONAL STUDIES:  Xray Chest 1 View- PORTABLE-Urgent (Xray Chest 1 View- PORTABLE-Urgent .) (06.02.24 @ 06:25) >  Clear lungs. Findings unchanged

## 2024-06-02 NOTE — PATIENT PROFILE ADULT - FALL HARM RISK - HARM RISK INTERVENTIONS

## 2024-06-02 NOTE — H&P ADULT - PROBLEM SELECTOR PLAN 1
Likely 2/2 to a hematologic malignancy, WBC>600  - F/u CBC  - F/u Uric acid (currently 8.4), K, Ca, Phos  - UA negative for proteinuria or infection, fever likely 2/2 to malignancy  - Heme/onc. following will follow recs  - If receiving treatment, pt would likely benefit from allopurinol to assist in TLS prevention  - Tylenol, Zofran PRN for symptomatic relief  - LR@100  - CT head negative, ordered MR head to r/o infarct/lesion as dizziness is persisting

## 2024-06-02 NOTE — H&P ADULT - HISTORY OF PRESENT ILLNESS
29M w/Hx of spontaneous pneumothorax presents as a transfer from [facility] due to significant leukocytosis (WBC >650) associated with dizziness, fever and weight loss. Patient reports feeling lightheaded since March and went through period of intentional fasting of 3 days which led to 15 pound weight loss and has not been able to gain weight back since. He had more intense and sustained dizziness since thursday that he describes as feeling off balance. He denies subjective fever but was found to be febrile here (100.4). He is otherwise anxious but no other acute complaints at this time. Denies night sweats. Patient further denies chest pain, SOB, headache, abd pain, nausea, vomiting, constipation, dysuria.

## 2024-06-02 NOTE — PROGRESS NOTE ADULT - ASSESSMENT
29M with PMH of spontaneous pneumothorax, who initially presented to [place] ED with dizziness and vertigo, found to have for profound leukocytosis (WBC > 600) and anemia. Patient reports feeling lightheaded since March and went through period of intentional fasting of 3 days which led to 15 pound weight loss and has not been able to gain weight back since. He had more intense and sustained dizziness since thursday that he describes as feeling off balance.  Transferred to [facility] for further management.

## 2024-06-03 ENCOUNTER — RESULT REVIEW (OUTPATIENT)
Age: 29
End: 2024-06-03

## 2024-06-03 ENCOUNTER — TRANSCRIPTION ENCOUNTER (OUTPATIENT)
Age: 29
End: 2024-06-03

## 2024-06-03 LAB
ALBUMIN SERPL ELPH-MCNC: 4.3 G/DL — SIGNIFICANT CHANGE UP (ref 3.3–5)
ALBUMIN SERPL ELPH-MCNC: 4.6 G/DL — SIGNIFICANT CHANGE UP (ref 3.3–5)
ALP SERPL-CCNC: 116 U/L — SIGNIFICANT CHANGE UP (ref 40–120)
ALP SERPL-CCNC: 123 U/L — HIGH (ref 40–120)
ALT FLD-CCNC: 33 U/L — SIGNIFICANT CHANGE UP (ref 10–45)
ALT FLD-CCNC: 37 U/L — SIGNIFICANT CHANGE UP (ref 10–45)
ANION GAP SERPL CALC-SCNC: 15 MMOL/L — SIGNIFICANT CHANGE UP (ref 5–17)
ANION GAP SERPL CALC-SCNC: 18 MMOL/L — HIGH (ref 5–17)
ANISOCYTOSIS BLD QL: SLIGHT — SIGNIFICANT CHANGE UP
AST SERPL-CCNC: 32 U/L — SIGNIFICANT CHANGE UP (ref 10–40)
AST SERPL-CCNC: 40 U/L — SIGNIFICANT CHANGE UP (ref 10–40)
BASOPHILS # BLD AUTO: 5.91 K/UL — HIGH (ref 0–0.2)
BASOPHILS # BLD AUTO: 6.38 K/UL — HIGH (ref 0–0.2)
BASOPHILS # BLD AUTO: 6.59 K/UL — HIGH (ref 0–0.2)
BASOPHILS NFR BLD AUTO: 1 % — SIGNIFICANT CHANGE UP (ref 0–2)
BILIRUB SERPL-MCNC: 0.8 MG/DL — SIGNIFICANT CHANGE UP (ref 0.2–1.2)
BILIRUB SERPL-MCNC: 0.8 MG/DL — SIGNIFICANT CHANGE UP (ref 0.2–1.2)
BLASTS # FLD: 1 % — HIGH (ref 0–0)
BLD GP AB SCN SERPL QL: NEGATIVE — SIGNIFICANT CHANGE UP
BUN SERPL-MCNC: 13 MG/DL — SIGNIFICANT CHANGE UP (ref 7–23)
BUN SERPL-MCNC: 14 MG/DL — SIGNIFICANT CHANGE UP (ref 7–23)
CALCIUM SERPL-MCNC: 10.2 MG/DL — SIGNIFICANT CHANGE UP (ref 8.4–10.5)
CALCIUM SERPL-MCNC: 9.3 MG/DL — SIGNIFICANT CHANGE UP (ref 8.4–10.5)
CHLORIDE SERPL-SCNC: 102 MMOL/L — SIGNIFICANT CHANGE UP (ref 96–108)
CHLORIDE SERPL-SCNC: 99 MMOL/L — SIGNIFICANT CHANGE UP (ref 96–108)
CO2 SERPL-SCNC: 23 MMOL/L — SIGNIFICANT CHANGE UP (ref 22–31)
CO2 SERPL-SCNC: 28 MMOL/L — SIGNIFICANT CHANGE UP (ref 22–31)
CREAT SERPL-MCNC: 0.71 MG/DL — SIGNIFICANT CHANGE UP (ref 0.5–1.3)
CREAT SERPL-MCNC: 0.89 MG/DL — SIGNIFICANT CHANGE UP (ref 0.5–1.3)
CULTURE RESULTS: SIGNIFICANT CHANGE UP
DACRYOCYTES BLD QL SMEAR: SLIGHT — SIGNIFICANT CHANGE UP
EGFR: 119 ML/MIN/1.73M2 — SIGNIFICANT CHANGE UP
EGFR: 127 ML/MIN/1.73M2 — SIGNIFICANT CHANGE UP
EOSINOPHIL # BLD AUTO: 38.26 K/UL — HIGH (ref 0–0.5)
EOSINOPHIL # BLD AUTO: 39.54 K/UL — HIGH (ref 0–0.5)
EOSINOPHIL # BLD AUTO: 47.26 K/UL — HIGH (ref 0–0.5)
EOSINOPHIL NFR BLD AUTO: 6 % — SIGNIFICANT CHANGE UP (ref 0–6)
EOSINOPHIL NFR BLD AUTO: 6 % — SIGNIFICANT CHANGE UP (ref 0–6)
EOSINOPHIL NFR BLD AUTO: 8 % — HIGH (ref 0–6)
GLUCOSE SERPL-MCNC: 81 MG/DL — SIGNIFICANT CHANGE UP (ref 70–99)
GLUCOSE SERPL-MCNC: 91 MG/DL — SIGNIFICANT CHANGE UP (ref 70–99)
HCT VFR BLD CALC: 24 % — LOW (ref 39–50)
HGB BLD-MCNC: 8.3 G/DL — LOW (ref 13–17)
HYPOCHROMIA BLD QL: SIGNIFICANT CHANGE UP
HYPOCHROMIA BLD QL: SIGNIFICANT CHANGE UP
INR BLD: 1.37 RATIO — HIGH (ref 0.85–1.18)
LDH SERPL L TO P-CCNC: 1092 U/L — HIGH (ref 50–242)
LDH SERPL L TO P-CCNC: 1436 U/L — HIGH (ref 50–242)
LG PLATELETS BLD QL AUTO: SLIGHT — SIGNIFICANT CHANGE UP
LG PLATELETS BLD QL AUTO: SLIGHT — SIGNIFICANT CHANGE UP
LYMPHOCYTES # BLD AUTO: 23.63 K/UL — HIGH (ref 1–3.3)
LYMPHOCYTES # BLD AUTO: 38.26 K/UL — HIGH (ref 1–3.3)
LYMPHOCYTES # BLD AUTO: 39.54 K/UL — HIGH (ref 1–3.3)
LYMPHOCYTES # BLD AUTO: 4 % — LOW (ref 13–44)
LYMPHOCYTES # BLD AUTO: 6 % — LOW (ref 13–44)
LYMPHOCYTES # BLD AUTO: 6 % — LOW (ref 13–44)
LYMPHOCYTES # SPEC AUTO: 6 % — HIGH (ref 0–0)
LYMPHOCYTES # SPEC AUTO: 6 % — HIGH (ref 0–0)
MAGNESIUM SERPL-MCNC: 2.3 MG/DL — SIGNIFICANT CHANGE UP (ref 1.6–2.6)
MAGNESIUM SERPL-MCNC: 2.4 MG/DL — SIGNIFICANT CHANGE UP (ref 1.6–2.6)
MANUAL DIF COMMENT BLD-IMP: SIGNIFICANT CHANGE UP
MANUAL SMEAR VERIFICATION: SIGNIFICANT CHANGE UP
MCHC RBC-ENTMCNC: 34.6 GM/DL — SIGNIFICANT CHANGE UP (ref 32–36)
MCHC RBC-ENTMCNC: 34.9 PG — HIGH (ref 27–34)
MCV RBC AUTO: 100.8 FL — HIGH (ref 80–100)
METAMYELOCYTES # FLD: 15 % — HIGH (ref 0–0)
METAMYELOCYTES # FLD: 15 % — HIGH (ref 0–0)
METAMYELOCYTES # FLD: 7 % — HIGH (ref 0–0)
MONOCYTES # BLD AUTO: 5.91 K/UL — HIGH (ref 0–0.9)
MONOCYTES # BLD AUTO: 6.38 K/UL — HIGH (ref 0–0.9)
MONOCYTES # BLD AUTO: 6.59 K/UL — HIGH (ref 0–0.9)
MONOCYTES NFR BLD AUTO: 1 % — LOW (ref 2–14)
MYELOCYTES NFR BLD: 30 % — HIGH (ref 0–0)
MYELOCYTES NFR BLD: 30 % — HIGH (ref 0–0)
MYELOCYTES NFR BLD: 35 % — HIGH (ref 0–0)
NEUTROPHILS # BLD AUTO: 183.12 K/UL — HIGH (ref 1.8–7.4)
NEUTROPHILS # BLD AUTO: 223.17 K/UL — HIGH (ref 1.8–7.4)
NEUTROPHILS # BLD AUTO: 230.67 K/UL — HIGH (ref 1.8–7.4)
NEUTROPHILS NFR BLD AUTO: 25 % — LOW (ref 43–77)
NEUTROPHILS NFR BLD AUTO: 30 % — LOW (ref 43–77)
NEUTROPHILS NFR BLD AUTO: 30 % — LOW (ref 43–77)
NEUTS BAND # BLD: 5 % — SIGNIFICANT CHANGE UP (ref 0–8)
NEUTS BAND # BLD: 5 % — SIGNIFICANT CHANGE UP (ref 0–8)
NEUTS BAND # BLD: 6 % — SIGNIFICANT CHANGE UP (ref 0–8)
NRBC # BLD: 0 /100 WBCS — SIGNIFICANT CHANGE UP (ref 0–0)
OVALOCYTES BLD QL SMEAR: SLIGHT — SIGNIFICANT CHANGE UP
PHOSPHATE SERPL-MCNC: 4.9 MG/DL — HIGH (ref 2.5–4.5)
PHOSPHATE SERPL-MCNC: 5.5 MG/DL — HIGH (ref 2.5–4.5)
PLAT MORPH BLD: NORMAL — SIGNIFICANT CHANGE UP
PLATELET # BLD AUTO: 178 K/UL — SIGNIFICANT CHANGE UP (ref 150–400)
PLATELET COUNT - ESTIMATE: NORMAL — SIGNIFICANT CHANGE UP
PLATELET COUNT - ESTIMATE: NORMAL — SIGNIFICANT CHANGE UP
POIKILOCYTOSIS BLD QL AUTO: SLIGHT — SIGNIFICANT CHANGE UP
POTASSIUM SERPL-MCNC: 3.5 MMOL/L — SIGNIFICANT CHANGE UP (ref 3.5–5.3)
POTASSIUM SERPL-MCNC: 3.8 MMOL/L — SIGNIFICANT CHANGE UP (ref 3.5–5.3)
POTASSIUM SERPL-SCNC: 3.5 MMOL/L — SIGNIFICANT CHANGE UP (ref 3.5–5.3)
POTASSIUM SERPL-SCNC: 3.8 MMOL/L — SIGNIFICANT CHANGE UP (ref 3.5–5.3)
PROMYELOCYTES # FLD: 12 % — HIGH (ref 0–0)
PROT SERPL-MCNC: 7.2 G/DL — SIGNIFICANT CHANGE UP (ref 6–8.3)
PROT SERPL-MCNC: 7.8 G/DL — SIGNIFICANT CHANGE UP (ref 6–8.3)
PROTHROM AB SERPL-ACNC: 14.3 SEC — HIGH (ref 9.5–13)
RBC # BLD: 2.38 M/UL — LOW (ref 4.2–5.8)
RBC # FLD: 20.4 % — HIGH (ref 10.3–14.5)
RBC BLD AUTO: ABNORMAL
RH IG SCN BLD-IMP: POSITIVE — SIGNIFICANT CHANGE UP
SODIUM SERPL-SCNC: 142 MMOL/L — SIGNIFICANT CHANGE UP (ref 135–145)
SODIUM SERPL-SCNC: 143 MMOL/L — SIGNIFICANT CHANGE UP (ref 135–145)
SPECIMEN SOURCE: SIGNIFICANT CHANGE UP
TM INTERPRETATION: SIGNIFICANT CHANGE UP
URATE SERPL-MCNC: 4.9 MG/DL — SIGNIFICANT CHANGE UP (ref 3.4–8.8)
URATE SERPL-MCNC: 6 MG/DL — SIGNIFICANT CHANGE UP (ref 3.4–8.8)
WBC # BLD: 590.7 K/UL — CRITICAL HIGH (ref 3.8–10.5)
WBC # FLD AUTO: 590.7 K/UL — CRITICAL HIGH (ref 3.8–10.5)

## 2024-06-03 PROCEDURE — 88313 SPECIAL STAINS GROUP 2: CPT | Mod: 26

## 2024-06-03 PROCEDURE — 38222 DX BONE MARROW BX & ASPIR: CPT | Mod: AS

## 2024-06-03 PROCEDURE — 88305 TISSUE EXAM BY PATHOLOGIST: CPT | Mod: 26

## 2024-06-03 PROCEDURE — 88342 IMHCHEM/IMCYTCHM 1ST ANTB: CPT | Mod: 26

## 2024-06-03 PROCEDURE — 99233 SBSQ HOSP IP/OBS HIGH 50: CPT | Mod: GC

## 2024-06-03 PROCEDURE — 88291 CYTO/MOLECULAR REPORT: CPT

## 2024-06-03 PROCEDURE — 88341 IMHCHEM/IMCYTCHM EA ADD ANTB: CPT | Mod: 26

## 2024-06-03 RX ORDER — CALCIUM ACETATE 667 MG
667 TABLET ORAL
Refills: 0 | Status: COMPLETED | OUTPATIENT
Start: 2024-06-03 | End: 2024-06-03

## 2024-06-03 RX ORDER — SODIUM CHLORIDE 0.65 %
1 AEROSOL, SPRAY (ML) NASAL
Refills: 0 | Status: DISCONTINUED | OUTPATIENT
Start: 2024-06-03 | End: 2024-06-08

## 2024-06-03 RX ADMIN — SODIUM CHLORIDE 100 MILLILITER(S): 9 INJECTION, SOLUTION INTRAVENOUS at 21:22

## 2024-06-03 RX ADMIN — Medication 1 SPRAY(S): at 15:11

## 2024-06-03 RX ADMIN — Medication 667 MILLIGRAM(S): at 10:31

## 2024-06-03 RX ADMIN — Medication 667 MILLIGRAM(S): at 14:56

## 2024-06-03 RX ADMIN — Medication 667 MILLIGRAM(S): at 17:38

## 2024-06-03 RX ADMIN — HYDROXYUREA 2000 MILLIGRAM(S): 500 CAPSULE ORAL at 21:22

## 2024-06-03 RX ADMIN — HYDROXYUREA 2000 MILLIGRAM(S): 500 CAPSULE ORAL at 10:31

## 2024-06-03 RX ADMIN — Medication 300 MILLIGRAM(S): at 12:37

## 2024-06-03 NOTE — PROGRESS NOTE ADULT - PROBLEM SELECTOR PLAN 4
6/2 TTE: The left ventricular cavity is normal in size. Left ventricular systolic function is normal with a calculated ejection fraction of 67 % by the Harris's biplane method of disks. There are no regional wall motion abnormalities seen. There is normal left ventricular diastolic function. There is increased LV mass and eccentric hypertrophy. Left ventricular global longitudinal strain is -22.4 % which is normal (< -18%).  LV Wall Scoring: All segments are normal.  6/2- Call Card consult in am. 6/2 TTE: The left ventricular cavity is normal in size. LVEF 67 %. There are no regional wall motion abnormalities seen. There is normal left ventricular diastolic function. There is increased LV mass and eccentric hypertrophy. Left ventricular global longitudinal strain is -22.4 % which is normal (< -18%). LV Wall Scoring: All segments are normal.  No acute intervention at this time.

## 2024-06-03 NOTE — CHART NOTE - NSCHARTNOTEFT_GEN_A_CORE
Hematology/Oncology Procedure Note    Bone Marrow Aspiration/Biopsy    Indication:    Bone marrow aspiration and biopsy procedure description, risks, and benefits were discussed in detail with the patient.  All questions were answered.  Informed consent was obtained and time-out performed.      The area of the left posterior iliac crest was prepped and draped using sterile technique. Local anesthetic with  2% Lidocaine.    Bone marrow aspiration and biopsy  was performed using sterile technique  by myself. Specimens were obtained. Unable to obtain aspirate. Dry tap.    The procedure was well tolerated and no local bleeding or other complications were observed.  Pressure was applied to the procedure site and a wound dressing was placed.  The patient and nursing staff were advised that the patient is to lie flat for 30 minutes post procedure. Tylenol may be used if no contraindications for pain at the procedure site.

## 2024-06-03 NOTE — SBIRT NOTE ADULT - NSSBIRTDRGBRIEFINTDET_GEN_A_CORE
Patient reports he smoke oil pen of marijuana. Motivation and readiness to reduce use was discussed.  LMSW offered substance use resources, patient declined.  Patient reports he does not see a problem with his usage and is able to stop if he chooses to.

## 2024-06-03 NOTE — PROGRESS NOTE ADULT - SUBJECTIVE AND OBJECTIVE BOX
Diagnosis: R/O Leukemia    Protocol/Chemo Regimen: TBD    Pt endorsed:  vertigo resolved this AM  +anxious regarding workup and possible diagnoses    Review of Systems:   Denies any nausea, vomiting, diarrhea, chest pain, abdominal pain.    Pain scale: Denies    Diet: Regular    Allergies: No Known Allergies      ANTIMICROBIALS      HEME/ONC MEDICATIONS  hydroxyurea 2000 milliGRAM(s) Oral every 12 hours      STANDING MEDICATIONS  allopurinol 300 milliGRAM(s) Oral daily  calcium acetate 667 milliGRAM(s) Oral three times a day with meals  lactated ringers. 1000 milliLiter(s) IV Continuous <Continuous>      PRN MEDICATIONS  acetaminophen     Tablet .. 650 milliGRAM(s) Oral every 6 hours PRN  aluminum hydroxide/magnesium hydroxide/simethicone Suspension 30 milliLiter(s) Oral every 4 hours PRN  melatonin 3 milliGRAM(s) Oral at bedtime PRN  ondansetron Injectable 4 milliGRAM(s) IV Push every 8 hours PRN        Vital Signs Last 24 Hrs  T(C): 36.9 (03 Jun 2024 09:40), Max: 37.4 (02 Jun 2024 13:00)  T(F): 98.4 (03 Jun 2024 09:40), Max: 99.3 (02 Jun 2024 13:00)  HR: 77 (03 Jun 2024 09:40) (73 - 93)  BP: 117/57 (03 Jun 2024 09:40) (107/70 - 121/71)  BP(mean): --  RR: 18 (03 Jun 2024 09:40) (18 - 18)  SpO2: 94% (03 Jun 2024 09:40) (91% - 95%)    Parameters below as of 03 Jun 2024 09:40  Patient On (Oxygen Delivery Method): nasal cannula  O2 Flow (L/min): 4      PHYSICAL EXAM  General: NAD, resting in bed.  HEENT: Clear oropharynx, anicteric sclera, pink conjunctiva  CV: (+) S1/S2 RRR  Lungs: clear to auscultation, no wheezes   Abdomen: soft, non-tender, non-distended (+) BS  Ext: no edema  Skin: no rashes    Neuro: alert and oriented X 3, no focal deficits  Central Line: PIVL          LABS:             8.3    590.70 )-----------( 178      ( 03 Jun 2024 07:12 )             24.0     Mean Cell Volume : 100.8 fl  Mean Cell Hemoglobin : 34.9 pg  Mean Cell Hemoglobin Concentration : 34.6 gm/dL  Auto Neutrophil # : 183.12 K/uL  Auto Lymphocyte # : 23.63 K/uL  Auto Monocyte # : 5.91 K/uL  Auto Eosinophil # : 47.26 K/uL  Auto Basophil # : 5.91 K/uL  Auto Neutrophil % : 25.0 %  Auto Lymphocyte % : 4.0 %  Auto Monocyte % : 1.0 %  Auto Eosinophil % : 8.0 %  Auto Basophil % : 1.0 %    06-03    143  |  102  |  13  ----------------------------<  91  3.5   |  23  |  0.71    Ca    9.3      03 Jun 2024 07:13  Phos  4.9     06-03  Mg     2.3     06-03    TPro  7.2  /  Alb  4.3  /  TBili  0.8  /  DBili  x   /  AST  32  /  ALT  33  /  AlkPhos  116  06-03    Mg 2.3  Phos 4.9    PT/INR - ( 03 Jun 2024 07:12 )   PT: 14.3 sec;   INR: 1.37 ratio       PTT - ( 02 Jun 2024 09:27 )  PTT:40.1 sec    LDH 1092  Uric Acid 6.0        RECENT CULTURES:  Culture - Blood (06.02.24 @ 04:50)    Specimen Source: .Blood Blood-Peripheral   Culture Results:   No growth at 24 hours    Culture - Blood (06.02.24 @ 04:25)    Specimen Source: .Blood Blood-Peripheral   Culture Results:   No growth at 24 hours    Culture - Urine (06.02.24 @ 02:45)    Specimen Source: Clean Catch Clean Catch (Midstream)   Culture Results:   <10,000 CFU/mL Normal Urogenital [place]        RADIOLOGY & ADDITIONAL STUDIES:  Xray Chest 1 View- PORTABLE-Urgent (Xray Chest 1 View- PORTABLE-Urgent .) (06.02.24 @ 06:25)   Clear lungs. Findings unchanged         Diagnosis: R/O Leukemia    Protocol/Chemo Regimen: TBD    Pt endorsed:  vertigo resolved this AM  +anxious regarding workup and possible diagnoses  +left ear pressure following BMBx (reports he was anxious and clenched his jaw throughout the procedure) - improving without intervention ~1 hr after the biopsy  +dry nostrils    Review of Systems:   Denies any nausea, vomiting, diarrhea, chest pain, abdominal pain.    Pain scale: Denies    Diet: Regular    Allergies: No Known Allergies      ANTIMICROBIALS      HEME/ONC MEDICATIONS  hydroxyurea 2000 milliGRAM(s) Oral every 12 hours      STANDING MEDICATIONS  allopurinol 300 milliGRAM(s) Oral daily  calcium acetate 667 milliGRAM(s) Oral three times a day with meals  lactated ringers. 1000 milliLiter(s) IV Continuous <Continuous>      PRN MEDICATIONS  acetaminophen     Tablet .. 650 milliGRAM(s) Oral every 6 hours PRN  aluminum hydroxide/magnesium hydroxide/simethicone Suspension 30 milliLiter(s) Oral every 4 hours PRN  melatonin 3 milliGRAM(s) Oral at bedtime PRN  ondansetron Injectable 4 milliGRAM(s) IV Push every 8 hours PRN        Vital Signs Last 24 Hrs  T(C): 36.9 (03 Jun 2024 09:40), Max: 37.4 (02 Jun 2024 13:00)  T(F): 98.4 (03 Jun 2024 09:40), Max: 99.3 (02 Jun 2024 13:00)  HR: 77 (03 Jun 2024 09:40) (73 - 93)  BP: 117/57 (03 Jun 2024 09:40) (107/70 - 121/71)  BP(mean): --  RR: 18 (03 Jun 2024 09:40) (18 - 18)  SpO2: 94% (03 Jun 2024 09:40) (91% - 95%)    Parameters below as of 03 Jun 2024 09:40  Patient On (Oxygen Delivery Method): nasal cannula  O2 Flow (L/min): 4      PHYSICAL EXAM  General: NAD, resting in bed.  HEENT: Clear oropharynx, anicteric sclera, pink conjunctiva  CV: (+) S1/S2 RRR  Lungs: clear to auscultation, no wheezes   Abdomen: soft, non-tender, non-distended (+) BS  Ext: no edema  Skin: no rashes    Neuro: alert and oriented X 3, no focal deficits  Central Line: PIVL          LABS:             8.3    590.70 )-----------( 178      ( 03 Jun 2024 07:12 )             24.0     Mean Cell Volume : 100.8 fl  Mean Cell Hemoglobin : 34.9 pg  Mean Cell Hemoglobin Concentration : 34.6 gm/dL  Auto Neutrophil # : 183.12 K/uL  Auto Lymphocyte # : 23.63 K/uL  Auto Monocyte # : 5.91 K/uL  Auto Eosinophil # : 47.26 K/uL  Auto Basophil # : 5.91 K/uL  Auto Neutrophil % : 25.0 %  Auto Lymphocyte % : 4.0 %  Auto Monocyte % : 1.0 %  Auto Eosinophil % : 8.0 %  Auto Basophil % : 1.0 %    06-03    143  |  102  |  13  ----------------------------<  91  3.5   |  23  |  0.71    Ca    9.3      03 Jun 2024 07:13  Phos  4.9     06-03  Mg     2.3     06-03    TPro  7.2  /  Alb  4.3  /  TBili  0.8  /  DBili  x   /  AST  32  /  ALT  33  /  AlkPhos  116  06-03    Mg 2.3  Phos 4.9    PT/INR - ( 03 Jun 2024 07:12 )   PT: 14.3 sec;   INR: 1.37 ratio       PTT - ( 02 Jun 2024 09:27 )  PTT:40.1 sec    LDH 1092  Uric Acid 6.0        RECENT CULTURES:  Culture - Blood (06.02.24 @ 04:50)    Specimen Source: .Blood Blood-Peripheral   Culture Results:   No growth at 24 hours    Culture - Blood (06.02.24 @ 04:25)    Specimen Source: .Blood Blood-Peripheral   Culture Results:   No growth at 24 hours    Culture - Urine (06.02.24 @ 02:45)    Specimen Source: Clean Catch Clean Catch (Midstream)   Culture Results:   <10,000 CFU/mL Normal Urogenital [place]a        RADIOLOGY & ADDITIONAL STUDIES:  Xray Chest 1 View- PORTABLE-Urgent (Xray Chest 1 View- PORTABLE-Urgent .) (06.02.24 @ 06:25)   Clear lungs. Findings unchanged

## 2024-06-03 NOTE — PROGRESS NOTE ADULT - PROBLEM SELECTOR PLAN 2
Not neutropenic, afebrile  If febrile. pan culture q 48 hrs  6/2 - Follow up BCx (fever ~2AM) Not neutropenic, afebrile  If febrile. pan culture q 48 hrs  6/2 - febrile ~2AM... BCx NGTD

## 2024-06-03 NOTE — DISCHARGE NOTE PROVIDER - NSDCFUADDAPPT_GEN_ALL_CORE_FT
You have a lab appointment at the [facility] on Tuesday, 6/11, at 12:00PM (noon).  You have a follow up appointment at the [facility] on Wednesday, 6/19, at 11:00AM with Dr. Mendiola. Please bring all of your medications with you, and please arrive 30 min early (10:30AM) for labs to be drawn prior to your appointment.

## 2024-06-03 NOTE — PROGRESS NOTE ADULT - NS ATTEND AMEND GEN_ALL_CORE FT
29M with PMH of spontaneous pneumothorax, who initially presented to [place] ED with dizziness and vertigo. Labs notable for profound leukocytosis (WBC > 600) and anemia.    Heme: CBC shows , Hgb 7.3, plt 202. WBC differential shows left-shift: 15% bands, 10% metamyelocytes, 36% myelocytes, 4% promyelocytes. No blasts reported. Suspicious for CML but will need BCR-ABL and BMBx to confirm diagnosis.  - Trend CBC with differential twice daily. Continue supportive transfusions as needed to maintain Hgb > 7 and plt > 10  (> 15 if febrile, > 50 if bleeding). Will give pRBCs today for symptomatic anemia and O2 requirement.  - Trend TLS labs (CMP, Phos, uric acid, LDH) twice daily. Start allopurinol. Consider rasburicase if uric acid > 8.   - Follow up peripheral blood flow and BCR-ABL  - Will need BMBx tomorrow to confirm diagnosis  - Start hydroxyurea 2 g BID for cytoreduction  - Follow up HIV and hepatitis serologies  - CTH negative. MRI brain scheduled given persistent dizziness. 29M with PMH of spontaneous pneumothorax, who initially presented to [place] ED with dizziness and vertigo. Labs notable for profound leukocytosis (WBC > 600) and anemia.    Heme: CBC shows , Hgb 7.3, plt 202. WBC differential shows left-shift: 15% bands, 10% metamyelocytes, 36% myelocytes, 4% promyelocytes. No blasts reported. Suspicious for CML but will need BCR-ABL and BMBx to confirm diagnosis.  - Trend CBC with differential twice daily. Continue supportive transfusions as needed to maintain Hgb > 7 and plt > 10  (> 15 if febrile, > 50 if bleeding).  - Trend TLS labs (CMP, Phos, uric acid, LDH) twice daily. Start allopurinol. Consider rasburicase if uric acid > 8.   - Follow up peripheral blood flow and BCR-ABL  - Plan for BMBx today to confirm diagnosis  - Cont hydroxyurea 2 g BID for cytoreduction  - Follow up HIV and hepatitis serologies  - CTH negative. MRI brain also unremarkable

## 2024-06-03 NOTE — PROGRESS NOTE ADULT - PROBLEM SELECTOR PLAN 1
R/O leukemia - WBC >650k on presentation  Transferred form  CBC shows leukocytosis  6/2- CBC shows  left-shift: 15% bands, 10% metamyelocytes, 36% myelocytes.  Monitor tumor lysis labs BID, CBC w/ dif, transfuse blood products and replete electrolytes PRN.  Strict Is/Os, daily weights, diuresis PRN, IVF.  6/2- Flow cyto sent, f/u results (BCR ABL)  On allopurinol. Consider rasburicase if uric acid > 8.   Continue Hydrea 2 gm Q 12 hrs  Will need BM Bx tomorrow to confirm diagnosis R/O leukemia - WBC >650k on presentation  Transferred form  CBC shows leukocytosis  6/2- CBC shows left-shift: 15% bands, 10% metamyelocytes, 36% myelocytes.  Monitor tumor lysis labs BID, CBC w/ dif, transfuse blood products and replete electrolytes PRN.  Strict Is/Os, daily weights, diuresis PRN, IVF.  Hepatitis w/u neg, HIV w/u neg, 6/2 G6PD pending   On allopurinol. Consider rasburicase if uric acid > 8.   6/2 - PB flow cytometry sent, follow up. Follow up PB BCR/ABL and FLT3 testing.   6/3 - BMBx, follow up results.   6/3 - Phoslo 667 mg PO x 3 doses for hyperphosphatemia, follow up tomorrow AM phos.  Continue Hydrea 2 gm Q 12 hrs R/O leukemia - WBC >650k on presentation  Transferred form  CBC shows leukocytosis  6/2- CBC shows left-shift: 15% bands, 10% metamyelocytes, 36% myelocytes.  Monitor tumor lysis labs BID, CBC w/ dif, transfuse blood products and replete electrolytes PRN.  Strict Is/Os, daily weights, diuresis PRN, IVF.  Hepatitis w/u neg, HIV w/u neg, 6/2 G6PD pending   On allopurinol. Consider rasburicase if uric acid > 8.   6/2 - PB flow cytometry sent, follow up. Follow up PB BCR/ABL and FLT3 testing.   6/3 - BMBx, follow up results. Monitor left ear pressure following BMBx from clenching jaw (improving).   6/3 - Phoslo 667 mg PO x 3 doses for hyperphosphatemia, follow up tomorrow AM phos.  Continue Hydrea 2 gm Q 12 hrs for leukoreduction R/O leukemia - WBC >650k on presentation  Transferred form  CBC shows leukocytosis  6/2- CBC shows left-shift: 15% bands, 10% metamyelocytes, 36% myelocytes.  Monitor tumor lysis labs BID, CBC w/ dif, transfuse blood products and replete electrolytes PRN.  Strict Is/Os, daily weights, diuresis PRN, IVF.  Hepatitis w/u neg, HIV w/u neg, 6/2 G6PD pending   On allopurinol. Consider rasburicase if uric acid > 8.   6/2 - PB flow cytometry sent, follow up. Follow up PB BCR/ABL and FLT3 testing.   6/3 - BMBx, follow up results. Monitor left ear pressure following BMBx from clenching jaw (improving).   6/3 - Phoslo 667 mg PO x 3 doses for hyperphosphatemia, follow up tomorrow AM phos.  6/3 - Titrate oxygen as tolerated, saturating ~92% on 4L NC this AM.   Continue Hydrea 2 gm Q 12 hrs for leukoreduction R/O leukemia - WBC >650k on presentation  Transferred form  CBC shows leukocytosis  6/2- CBC shows left-shift: 15% bands, 10% metamyelocytes, 36% myelocytes.  Monitor tumor lysis labs BID, CBC w/ dif, transfuse blood products and replete electrolytes PRN.  Strict Is/Os, daily weights, diuresis PRN, IVF.  Hepatitis w/u neg, HIV w/u neg, 6/2 G6PD pending   On allopurinol. Consider rasburicase if uric acid > 8.   6/2 - PB flow cytometry sent, follow up. Follow up PB BCR/ABL and FLT3 testing.   6/3 - BMBx, follow up results. Monitor left ear pressure following BMBx from clenching jaw (improving).   6/3 - Phoslo 667 mg PO x 3 doses for hyperphosphatemia, follow up tomorrow AM phos.  6/3 - Titrate oxygen as tolerated, saturating ~92% on 4L NC this AM. Not short of breath per patient.   Continue Hydrea 2 gm Q 12 hrs for leukoreduction

## 2024-06-03 NOTE — PHARMACOTHERAPY INTERVENTION NOTE - COMMENTS
Clinical Pharmacy Specialist- Hematology/Oncology- Progress Note    Pt is ***PMH    Antimicrobial Course:  -None needed currently  -Posaconazole- ***date  -Cefepime/Levofloxacin ***date  -Valacylovir-     Last Neutropenic (ANC<1000):   Last Febrile:   Days no longer Neutropenic:   Days afebrile:    Chemotherapy Course  -Regimen:  -Day:  BmBx:  Access:     Relevant clinical information used in assessment:  -Crcl= ***;Scr=***; Wt=*** ; ***Adjusted BW used since pt BMI>30/ Actual BW used since BMI<30    Assessment/Plan/Recommendation:      Additional Monitoring Needed?   -Yes- Continue to monitor renal function & daily counts for abx escalation/de-escalation   --Other medications that may require further renal dose adjustments:*** if CrCl<  -Discharge Planning:  --> New meds:  --> Meds sent for auth:  --> Delivered meds    Case discussed with attending/primary team    Priti Maloney PharmD, BCPS  Clinical Pharmacy Specialist | Hematology/Oncology  [facility]  Email: [email or web address] or available on [email or web address]  Clinical Pharmacy Specialist- Hematology/Oncology- Progress Note    Pt is a 28 y/o male with no significant PMH, presenting with dizziness, vertigo, weight loss, and subsequently found to have profound leukocytosis (WBC > 600) and anemia, now admitted for further management of leukocytosis    Antimicrobial Course:  -None needed currently    Last Neutropenic (ANC<1000): no occurrence  Last Febrile:  no occurrence  Days no longer Neutropenic: 1  Days afebrile: 1    Chemotherapy Course  -Regimen: TBD  -Day:  BmBx: 6/3  Access:     Relevant clinical information used in assessment:  - EF = 67% (6/2)    Assessment/Plan/Recommendation:  - on hydrea 2gm q12hr  - BMBx today     Additional Monitoring Needed?   -Yes- Continue to monitor renal function & daily counts for abx escalation/de-escalation   --Other medications that may require further renal dose adjustments:*** if CrCl<  -Discharge Planning:  --> New meds:  --> Meds sent for auth:  --> Delivered meds    Case discussed with attending/primary team    Priti Maloney, PharmD, BCPS  Clinical Pharmacy Specialist | Hematology/Oncology  [facility]  Email: [email or web address] or available on [email or web address]

## 2024-06-03 NOTE — PROGRESS NOTE ADULT - PROBLEM SELECTOR PLAN 3
Imaging of head to r/o infarct/lesion as dizziness is persisting.  6/1- CTH (-)  6/1- MRI Head (-) Imaging of head to r/o infarct/lesion as dizziness is persisting.  6/1- CTH (-)  6/1- MRI Head (-)  6/3 vertigo resolved this AM

## 2024-06-03 NOTE — PROGRESS NOTE ADULT - PROBLEM SELECTOR PLAN 5
Encourage ambulation, will hold off on pharmacologic VTE prophylaxis at this time Hold off on pharmacologic VTE prophylaxis at this time  Encourage OOB and ambulation for VTE ppx Hold off on pharmacologic VTE prophylaxis at this time  Encourage OOB and ambulation for VTE ppx  Ocean nasal spray PRN for nasal dryness/nasal congestion

## 2024-06-03 NOTE — DISCHARGE NOTE PROVIDER - NSDCFUADDINST_GEN_ALL_CORE_FT
You have a lab appointment at the [facility] on Tuesday, 6/11, at 12:00PM (noon).  You have a follow up appointment at the [facility] on Wednesday, 6/19, at 11:00AM with Dr. Mendiola. Please bring all of your medications with you, and please arrive 30 min early (10:30AM) for labs to be drawn prior to your appointment.   If your dizziness recurrs, please call the [facility] for further instruction. If you are lightheaded, remain seated/laying down until you feel better. You have been ambulating without issue in the hospital, and do not have any skilled PT needs.   Do not take NSAIDs (Motrin, Advil, Ibuprofen) as these can affect your platelet count.  You have a lab appointment at the [facility] on Tuesday, 6/11, at 12:00PM (noon).  You have a follow up appointment at the [facility] on Wednesday, 6/19, at 11:00AM with Dr. Mendiola. Please bring all of your medications with you, and please arrive 30 min early (10:30AM) for labs to be drawn prior to your appointment.   If your dizziness recurrs, please call the [facility] for further instruction. If you are lightheaded, remain seated/laying down until you feel better.    Do not take NSAIDs (Motrin, Advil, Ibuprofen) as these can affect your platelet count.  You have a lab appointment at the [facility] on Tuesday, 6/11, at 12:00PM (noon).  You have a follow up appointment at the [facility] on Wednesday, 6/19, at 11:00AM with Dr. Mendiola. Please bring all of your medications with you, and please arrive 30 min early (10:30AM) for labs to be drawn prior to your appointment.     If your dizziness recurs, please call the [facility] for further instruction. If you are lightheaded, remain seated/laying down until you feel better, notify your doctor for any worsening lightheadedness/symptoms.   Do not take NSAIDs (Motrin, Advil, Ibuprofen) as these can affect your platelet count.   Light exercise (walking) is okay, but avoid strenuous activity at this time.   Please make sure to take your dasatinib daily and your other medications as instructed, unless changed by your outpatient physician.  Avoid eating out as much as possible, cook your own food when able (to prevent food poisoning).   If your tinnitus persists, please follow up with an ENT physician within 2 weeks of discharge.   Please follow up with your primary care physician within 2 weeks of discharge to go over your hospitalization and new diagnosis.

## 2024-06-03 NOTE — DISCHARGE NOTE PROVIDER - NSDCMRMEDTOKEN_GEN_ALL_CORE_FT
Proventil HFA 90 mcg/inh inhalation aerosol: 2 puff(s) inhaled every 4 hours, As Needed- for shortness of breath and/or wheezing    dasatinib 140 mg oral tablet: 1 tab(s) orally once a day  fluconazole 200 mg oral tablet: 1 tab(s) orally once a day - DO NOT TAKE until instructed by your provider  levoFLOXacin 500 mg oral tablet: 1 tab(s) orally once a day - DO NOT TAKE until instructed by your provider  melatonin 3 mg oral tablet: 1 tab(s) orally once a day (at bedtime) As needed Insomnia  ondansetron 8 mg oral tablet: 1 tab(s) orally every 8 hours as needed for nausea/vomiting  Proventil HFA 90 mcg/inh inhalation aerosol: 2 puff(s) inhaled every 4 hours, As Needed- for shortness of breath and/or wheezing   Valtrex 500 mg oral tablet: 1 tab(s) orally every 12 hours - DO NOT TAKE until instructed by your provider   allopurinol 300 mg oral tablet: 1 tab(s) orally once a day - please continue until you finish the bottle or your physician tells you to stop  dasatinib 100 mg oral tablet: 1 tab(s) orally once a day  fluconazole 200 mg oral tablet: 1 tab(s) orally once a day - DO NOT TAKE until instructed by your provider  hydroxyurea 500 mg oral capsule: 2 cap(s) orally every 12 hours (1 gram by mouth twice daily) - please take until instructed not to by your outpatient physician  levoFLOXacin 500 mg oral tablet: 1 tab(s) orally once a day - DO NOT TAKE until instructed by your provider  melatonin 3 mg oral tablet: 1 tab(s) orally once a day (at bedtime) As needed Insomnia  ondansetron 8 mg oral tablet: 1 tab(s) orally every 8 hours as needed for nausea/vomiting  Proventil HFA 90 mcg/inh inhalation aerosol: 2 puff(s) inhaled every 4 hours, As Needed- for shortness of breath and/or wheezing   Valtrex 500 mg oral tablet: 1 tab(s) orally every 12 hours - DO NOT TAKE until instructed by your provider

## 2024-06-03 NOTE — PROGRESS NOTE ADULT - ASSESSMENT
29M with PMH of spontaneous pneumothorax, who initially presented to [place] ED with dizziness and vertigo, found to have for profound leukocytosis (WBC > 600) and anemia. Patient reports feeling lightheaded since March and went through period of intentional fasting of 3 days which led to 15 pound weight loss and has not been able to gain weight back since. He had more intense and sustained dizziness since thursday that he describes as feeling off balance.  Transferred to [facility] for further management.       29M with PMH of spontaneous pneumothorax, who presented to [place] ED with dizziness and vertigo, found to have for profound leukocytosis (WBC > 600) and anemia. Patient transferred to [address] [place] [address] for further workup and management, working to r/o CML. BMBx on 6/3 pending, PB flow 6/2 pending. Course complicated by non neutropenic fever (culture neg). Patient with thrombocytopenia, anemia, and leukocytosis (being managed with hydrea) secondary to disease.

## 2024-06-03 NOTE — DISCHARGE NOTE PROVIDER - NSDCCPCAREPLAN_GEN_ALL_CORE_FT
PRINCIPAL DISCHARGE DIAGNOSIS  Diagnosis: Chronic myeloid leukemia  Assessment and Plan of Treatment: Notify MD and report to ER for any temperature greater than or equal to 100.4 degrees, intractable nausea, vomiting, diarrhea, or uncontrolled bleeding.

## 2024-06-03 NOTE — DISCHARGE NOTE PROVIDER - HOSPITAL COURSE
29M with PMH of spontaneous pneumothorax, who presented to [place] ED with dizziness and vertigo, found to have for profound leukocytosis (WBC > 600) and anemia. Patient transferred to [address] [place] [address] for further workup and management, working to r/o CML. BMBx dry tap on 6/3 pending, PB flow 6/2 pending. Course complicated by non neutropenic fever (culture neg). Patient with thrombocytopenia, anemia, and leukocytosis (being managed with hydrea) secondary to disease. 29M with PMH of spontaneous pneumothorax, who presented to [place] ED with dizziness and vertigo, found to have for profound leukocytosis (WBC > 600) and anemia. Patient transferred to [address] [place] [address] for further workup and management, working to r/o CML. BMBx dry tap on 6/3 pending, 6/2 - PB flow cytometry: myeloid immunophenotypic findings show an increase in CD34/ positive myeloblasts, 1.5% of cells, with normal immunophenotype, rare monocytes, and myeloid antigen maturation pattern with marked myeloid left shift and presence of 2% basophils. PB FLT3 (-). 6/2 - PB ABNORMAL FISH - Atypical BCR::ABL1 fusion pattern detected (98.5%). Treatment for CML started on 6/4 based on the FISH +BCR:ABL with sprycel.     Course complicated by non neutropenic fever (culture neg), severe leukocytosis managed with hydrea.     Patient now stable for discharge home with outpatient follow up. 29M with PMH of spontaneous pneumothorax, who presented to [place] ED with dizziness and vertigo, found to have for profound leukocytosis (WBC > 600) and anemia. Patient transferred to [address] [place] [address] for further workup and management, working to r/o CML. BMBx dry tap on but good core c/w CML,  6/2 - PB flow cytometry: myeloid immunophenotypic findings show an increase in CD34/ positive myeloblasts, 1.5% of cells, with normal immunophenotype, rare monocytes, and myeloid antigen maturation pattern with marked myeloid left shift and presence of 2% basophils. PB FLT3 (-). 6/2 - PB ABNORMAL FISH - Atypical BCR::ABL1 fusion pattern detected (98.5%). Treatment for CML started on 6/4 based on the FISH +BCR:ABL with sprycel.     Course complicated by non neutropenic fever (culture neg), severe leukocytosis managed with hydrea, transaminitis (stable), tinnitus. Dizziness present on presentation resolved, patient with occasional lightheadedness with blood draws and when taking dasatinib which improves with eating food and resting.     Patient now stable for discharge home with outpatient follow up. 29M with PMH of spontaneous pneumothorax, who presented to [place] ED with dizziness and vertigo, found to have for profound leukocytosis (WBC > 600) and anemia. Patient transferred to [address] [place] [address] for further workup and management, working to r/o CML. BMBx dry tap on but good core c/w CML,  6/2 - PB flow cytometry: myeloid immunophenotypic findings show an increase in CD34/ positive myeloblasts, 1.5% of cells, with normal immunophenotype, rare monocytes, and myeloid antigen maturation pattern with marked myeloid left shift and presence of 2% basophils. PB FLT3 (-). 6/2 - PB ABNORMAL FISH - Atypical BCR::ABL1 fusion pattern detected (98.5%). Treatment for CML started on 6/4 based on the FISH +BCR:ABL with sprycel.     Course complicated by non neutropenic fever (culture neg), severe leukocytosis managed with hydrea, transaminitis (stable), tinnitus. Dizziness present on presentation resolved, patient with occasional lightheadedness with blood draws and when taking dasatinib which improves with eating food and resting. CTH (-) and MRI Head (-).     Patient now stable for discharge home with outpatient follow up. 29M with PMH of spontaneous pneumothorax, who presented to [place] ED with dizziness and vertigo, found to have for profound leukocytosis (WBC > 600) and anemia. Patient transferred to [address] [place] [address] for further workup and management, working to r/o CML. BMBx dry tap on but good core c/w CML,  6/2 - PB flow cytometry: myeloid immunophenotypic findings show an increase in CD34/ positive myeloblasts, 1.5% of cells, with normal immunophenotype, rare monocytes, and myeloid antigen maturation pattern with marked myeloid left shift and presence of 2% basophils. PB FLT3 (-). 6/2 - PB ABNORMAL FISH - Atypical BCR::ABL1 fusion pattern detected (98.5%). Treatment for CML started on 6/4 based on the FISH +BCR:ABL with sprycel.     Course complicated by non neutropenic fever (culture neg), severe leukocytosis managed with hydrea, transaminitis (stable), tinnitus (much improved). Dizziness present on presentation resolved, patient with occasional lightheadedness with blood draws and when taking dasatinib which improves with eating food and resting. CTH (-) and MRI Head (-).     Patient now stable for discharge home with outpatient follow up.

## 2024-06-03 NOTE — DISCHARGE NOTE PROVIDER - NSDCFUSCHEDAPPT_GEN_ALL_CORE_FT
[facility]  [facility]  Scheduled Appointment: 06/11/2024    Melissa Mendiola [facility]  [facility]  Scheduled Appointment: 06/19/2024

## 2024-06-04 LAB
ALBUMIN SERPL ELPH-MCNC: 4.1 G/DL — SIGNIFICANT CHANGE UP (ref 3.3–5)
ALBUMIN SERPL ELPH-MCNC: 4.6 G/DL — SIGNIFICANT CHANGE UP (ref 3.3–5)
ALP SERPL-CCNC: 115 U/L — SIGNIFICANT CHANGE UP (ref 40–120)
ALP SERPL-CCNC: 145 U/L — HIGH (ref 40–120)
ALT FLD-CCNC: 31 U/L — SIGNIFICANT CHANGE UP (ref 10–45)
ALT FLD-CCNC: 45 U/L — SIGNIFICANT CHANGE UP (ref 10–45)
ANION GAP SERPL CALC-SCNC: 17 MMOL/L — SIGNIFICANT CHANGE UP (ref 5–17)
ANION GAP SERPL CALC-SCNC: 17 MMOL/L — SIGNIFICANT CHANGE UP (ref 5–17)
AST SERPL-CCNC: 37 U/L — SIGNIFICANT CHANGE UP (ref 10–40)
AST SERPL-CCNC: 63 U/L — HIGH (ref 10–40)
BASOPHILS # BLD AUTO: 0 K/UL — SIGNIFICANT CHANGE UP (ref 0–0.2)
BILIRUB SERPL-MCNC: 0.6 MG/DL — SIGNIFICANT CHANGE UP (ref 0.2–1.2)
BILIRUB SERPL-MCNC: 0.7 MG/DL — SIGNIFICANT CHANGE UP (ref 0.2–1.2)
BLASTS # FLD: 2 % — HIGH (ref 0–0)
BUN SERPL-MCNC: 15 MG/DL — SIGNIFICANT CHANGE UP (ref 7–23)
BUN SERPL-MCNC: 23 MG/DL — SIGNIFICANT CHANGE UP (ref 7–23)
CALCIUM SERPL-MCNC: 10.1 MG/DL — SIGNIFICANT CHANGE UP (ref 8.4–10.5)
CALCIUM SERPL-MCNC: 9.8 MG/DL — SIGNIFICANT CHANGE UP (ref 8.4–10.5)
CHLORIDE SERPL-SCNC: 100 MMOL/L — SIGNIFICANT CHANGE UP (ref 96–108)
CHLORIDE SERPL-SCNC: 100 MMOL/L — SIGNIFICANT CHANGE UP (ref 96–108)
CHROM ANALY INTERPHASE BLD FISH-IMP: SIGNIFICANT CHANGE UP
CO2 SERPL-SCNC: 23 MMOL/L — SIGNIFICANT CHANGE UP (ref 22–31)
CO2 SERPL-SCNC: 23 MMOL/L — SIGNIFICANT CHANGE UP (ref 22–31)
CREAT SERPL-MCNC: 0.89 MG/DL — SIGNIFICANT CHANGE UP (ref 0.5–1.3)
CREAT SERPL-MCNC: 0.91 MG/DL — SIGNIFICANT CHANGE UP (ref 0.5–1.3)
EGFR: 117 ML/MIN/1.73M2 — SIGNIFICANT CHANGE UP
EGFR: 119 ML/MIN/1.73M2 — SIGNIFICANT CHANGE UP
EOSINOPHIL # BLD AUTO: 19.49 K/UL — HIGH (ref 0–0.5)
EOSINOPHIL NFR BLD AUTO: 4 % — SIGNIFICANT CHANGE UP (ref 0–6)
GLUCOSE SERPL-MCNC: 106 MG/DL — HIGH (ref 70–99)
GLUCOSE SERPL-MCNC: 91 MG/DL — SIGNIFICANT CHANGE UP (ref 70–99)
HCT VFR BLD CALC: 22.2 % — LOW (ref 39–50)
HGB BLD-MCNC: 8.1 G/DL — LOW (ref 13–17)
HLX FLT3 FINAL REPORT: SIGNIFICANT CHANGE UP
LDH SERPL L TO P-CCNC: 1436 U/L — HIGH (ref 50–242)
LDH SERPL L TO P-CCNC: 2031 U/L — HIGH (ref 50–242)
LYMPHOCYTES # BLD AUTO: 1 % — LOW (ref 13–44)
LYMPHOCYTES # BLD AUTO: 4.87 K/UL — HIGH (ref 1–3.3)
MAGNESIUM SERPL-MCNC: 2.2 MG/DL — SIGNIFICANT CHANGE UP (ref 1.6–2.6)
MAGNESIUM SERPL-MCNC: 2.2 MG/DL — SIGNIFICANT CHANGE UP (ref 1.6–2.6)
MANUAL DIF COMMENT BLD-IMP: SIGNIFICANT CHANGE UP
MCHC RBC-ENTMCNC: 34.2 PG — HIGH (ref 27–34)
MCHC RBC-ENTMCNC: 36.5 GM/DL — HIGH (ref 32–36)
MCV RBC AUTO: 93.7 FL — SIGNIFICANT CHANGE UP (ref 80–100)
METAMYELOCYTES # FLD: 3 % — HIGH (ref 0–0)
MONOCYTES # BLD AUTO: 34.12 K/UL — HIGH (ref 0–0.9)
MONOCYTES NFR BLD AUTO: 7 % — SIGNIFICANT CHANGE UP (ref 2–14)
MYELOCYTES NFR BLD: 22 % — HIGH (ref 0–0)
NEUTROPHILS # BLD AUTO: 282.68 K/UL — HIGH (ref 1.8–7.4)
NEUTROPHILS NFR BLD AUTO: 46 % — SIGNIFICANT CHANGE UP (ref 43–77)
NEUTS BAND # BLD: 12 % — HIGH (ref 0–8)
NRBC # BLD: 2 /100 WBCS — HIGH (ref 0–0)
PHOSPHATE SERPL-MCNC: 5 MG/DL — HIGH (ref 2.5–4.5)
PHOSPHATE SERPL-MCNC: 5.4 MG/DL — HIGH (ref 2.5–4.5)
PLAT MORPH BLD: NORMAL — SIGNIFICANT CHANGE UP
PLATELET # BLD AUTO: 178 K/UL — SIGNIFICANT CHANGE UP (ref 150–400)
POTASSIUM SERPL-MCNC: 3.6 MMOL/L — SIGNIFICANT CHANGE UP (ref 3.5–5.3)
POTASSIUM SERPL-MCNC: 4.1 MMOL/L — SIGNIFICANT CHANGE UP (ref 3.5–5.3)
POTASSIUM SERPL-SCNC: 3.6 MMOL/L — SIGNIFICANT CHANGE UP (ref 3.5–5.3)
POTASSIUM SERPL-SCNC: 4.1 MMOL/L — SIGNIFICANT CHANGE UP (ref 3.5–5.3)
PROMYELOCYTES # FLD: 3 % — HIGH (ref 0–0)
PROT SERPL-MCNC: 6.9 G/DL — SIGNIFICANT CHANGE UP (ref 6–8.3)
PROT SERPL-MCNC: 7.6 G/DL — SIGNIFICANT CHANGE UP (ref 6–8.3)
RBC # BLD: 2.37 M/UL — LOW (ref 4.2–5.8)
RBC # FLD: 19.5 % — HIGH (ref 10.3–14.5)
RBC BLD AUTO: SIGNIFICANT CHANGE UP
SODIUM SERPL-SCNC: 140 MMOL/L — SIGNIFICANT CHANGE UP (ref 135–145)
SODIUM SERPL-SCNC: 140 MMOL/L — SIGNIFICANT CHANGE UP (ref 135–145)
URATE SERPL-MCNC: 4.3 MG/DL — SIGNIFICANT CHANGE UP (ref 3.4–8.8)
URATE SERPL-MCNC: 5.4 MG/DL — SIGNIFICANT CHANGE UP (ref 3.4–8.8)
WBC # BLD: 487.38 K/UL — CRITICAL HIGH (ref 3.8–10.5)
WBC # FLD AUTO: 487.38 K/UL — CRITICAL HIGH (ref 3.8–10.5)

## 2024-06-04 PROCEDURE — 76705 ECHO EXAM OF ABDOMEN: CPT | Mod: 26

## 2024-06-04 PROCEDURE — 99232 SBSQ HOSP IP/OBS MODERATE 35: CPT | Mod: GC

## 2024-06-04 RX ORDER — DASATINIB 80 MG/1
1 TABLET ORAL
Qty: 30 | Refills: 6
Start: 2024-06-04 | End: 2024-12-30

## 2024-06-04 RX ORDER — LANOLIN ALCOHOL/MO/W.PET/CERES
1 CREAM (GRAM) TOPICAL
Qty: 0 | Refills: 0 | DISCHARGE
Start: 2024-06-04

## 2024-06-04 RX ORDER — VALACYCLOVIR 500 MG/1
1 TABLET, FILM COATED ORAL
Qty: 60 | Refills: 3
Start: 2024-06-04 | End: 2024-10-01

## 2024-06-04 RX ORDER — LEVOFLOXACIN 5 MG/ML
1 INJECTION, SOLUTION INTRAVENOUS
Qty: 30 | Refills: 3
Start: 2024-06-04 | End: 2024-10-01

## 2024-06-04 RX ORDER — DASATINIB 80 MG/1
140 TABLET ORAL DAILY
Refills: 0 | Status: DISCONTINUED | OUTPATIENT
Start: 2024-06-04 | End: 2024-06-05

## 2024-06-04 RX ORDER — CALCIUM ACETATE 667 MG
1334 TABLET ORAL
Refills: 0 | Status: COMPLETED | OUTPATIENT
Start: 2024-06-04 | End: 2024-06-04

## 2024-06-04 RX ORDER — FLUCONAZOLE 150 MG/1
1 TABLET ORAL
Qty: 30 | Refills: 3
Start: 2024-06-04 | End: 2024-10-01

## 2024-06-04 RX ORDER — ONDANSETRON 8 MG/1
1 TABLET, FILM COATED ORAL
Qty: 90 | Refills: 1
Start: 2024-06-04 | End: 2024-08-02

## 2024-06-04 RX ADMIN — SODIUM CHLORIDE 100 MILLILITER(S): 9 INJECTION, SOLUTION INTRAVENOUS at 21:23

## 2024-06-04 RX ADMIN — Medication 1334 MILLIGRAM(S): at 09:40

## 2024-06-04 RX ADMIN — SODIUM CHLORIDE 100 MILLILITER(S): 9 INJECTION, SOLUTION INTRAVENOUS at 09:40

## 2024-06-04 RX ADMIN — HYDROXYUREA 2000 MILLIGRAM(S): 500 CAPSULE ORAL at 09:40

## 2024-06-04 RX ADMIN — Medication 1334 MILLIGRAM(S): at 12:00

## 2024-06-04 RX ADMIN — Medication 1334 MILLIGRAM(S): at 17:43

## 2024-06-04 RX ADMIN — HYDROXYUREA 2000 MILLIGRAM(S): 500 CAPSULE ORAL at 21:24

## 2024-06-04 RX ADMIN — DASATINIB 140 MILLIGRAM(S): 80 TABLET ORAL at 17:43

## 2024-06-04 RX ADMIN — Medication 300 MILLIGRAM(S): at 12:00

## 2024-06-04 NOTE — PROGRESS NOTE ADULT - SUBJECTIVE AND OBJECTIVE BOX
Diagnosis: R/O Leukemia    Protocol/Chemo Regimen: TBD    Pt endorsed:  vertigo resolved this AM  +anxious regarding workup and possible diagnoses  +left ear pressure following BMBx (reports he was anxious and clenched his jaw throughout the procedure) - improving without intervention ~1 hr after the biopsy  +dry nostrils    Review of Systems:   Denies any nausea, vomiting, diarrhea, chest pain, abdominal pain.    Pain scale: Denies    Diet: Regular    Allergies: No Known Allergies    ANTIMICROBIALS      HEME/ONC MEDICATIONS  hydroxyurea 2000 milliGRAM(s) Oral every 12 hours      STANDING MEDICATIONS  allopurinol 300 milliGRAM(s) Oral daily  calcium acetate 1334 milliGRAM(s) Oral three times a day with meals  lactated ringers. 1000 milliLiter(s) IV Continuous <Continuous>      PRN MEDICATIONS  acetaminophen     Tablet .. 650 milliGRAM(s) Oral every 6 hours PRN  aluminum hydroxide/magnesium hydroxide/simethicone Suspension 30 milliLiter(s) Oral every 4 hours PRN  melatonin 3 milliGRAM(s) Oral at bedtime PRN  ondansetron Injectable 4 milliGRAM(s) IV Push every 8 hours PRN  sodium chloride 0.65% Nasal 1 Spray(s) Both Nostrils two times a day PRN        Vital Signs Last 24 Hrs  T(C): 36.6 (04 Jun 2024 04:53), Max: 36.9 (03 Jun 2024 09:40)  T(F): 97.8 (04 Jun 2024 04:53), Max: 98.4 (03 Jun 2024 09:40)  HR: 74 (04 Jun 2024 04:53) (74 - 83)  BP: 111/53 (04 Jun 2024 04:53) (103/55 - 118/69)  BP(mean): --  RR: 18 (04 Jun 2024 04:53) (18 - 18)  SpO2: 95% (04 Jun 2024 04:53) (92% - 98%)    Parameters below as of 04 Jun 2024 04:53  Patient On (Oxygen Delivery Method): nasal cannula  O2 Flow (L/min): 2      PHYSICAL EXAM  General: NAD, resting in bed.  HEENT: Clear oropharynx, anicteric sclera, pink conjunctiva  CV: (+) S1/S2 RRR  Lungs: clear to auscultation, no wheezes   Abdomen: soft, non-tender, non-distended (+) BS  Ext: no edema  Skin: no rashes    Neuro: alert and oriented X 3, no focal deficits  Central Line: PIVL            LABS:                8.1    487.38 )-----------( 178      ( 04 Jun 2024 06:51 )                22.2     Mean Cell Volume : 93.7 fl  Mean Cell Hemoglobin : 34.2 pg  Mean Cell Hemoglobin Concentration : 36.5 gm/dL  Auto Neutrophil # : x  Auto Lymphocyte # : x  Auto Monocyte # : x  Auto Eosinophil # : x  Auto Basophil # : x  Auto Neutrophil % : x  Auto Lymphocyte % : x  Auto Monocyte % : x  Auto Eosinophil % : x  Auto Basophil % : x      06-04    140  |  100  |  15  ----------------------------<  91  3.6   |  23  |  0.89    Ca    9.8      04 Jun 2024 06:45  Phos  5.4     06-04  Mg     2.2     06-04    TPro  6.9  /  Alb  4.1  /  TBili  0.7  /  DBili  x   /  AST  37  /  ALT  31  /  AlkPhos  115  06-04    Mg 2.2  Phos 5.4    PT/INR - ( 03 Jun 2024 07:12 )   PT: 14.3 sec;   INR: 1.37 ratio       PTT - ( 02 Jun 2024 09:27 )  PTT:40.1 sec    LDH 1436  Uric Acid 5.4          RECENT CULTURES:  Culture - Blood (06.02.24 @ 04:50)    Specimen Source: .Blood Blood-Peripheral   Culture Results:   No growth at 24 hours    Culture - Blood (06.02.24 @ 04:25)    Specimen Source: .Blood Blood-Peripheral   Culture Results:   No growth at 24 hours    Culture - Urine (06.02.24 @ 02:45)    Specimen Source: Clean Catch Clean Catch (Midstream)   Culture Results:   <10,000 CFU/mL Normal Urogenital [place]        RADIOLOGY & ADDITIONAL STUDIES:  Xray Chest 1 View- PORTABLE-Urgent (Xray Chest 1 View- PORTABLE-Urgent .) (06.02.24 @ 06:25)   Clear lungs. Findings unchanged   Diagnosis: R/O Leukemia    Protocol/Chemo Regimen: TBD    Pt endorsed:  vertigo resolved, but occasional lightheadedness (eating food and taking a nap helps to improve lightheadedness  +left ear pressure almost fully resolved  +dry nostrils    Review of Systems:   Denies any nausea, vomiting, diarrhea, chest pain, abdominal pain.    Pain scale: Denies    Diet: Regular    Allergies: No Known Allergies    ANTIMICROBIALS      HEME/ONC MEDICATIONS  hydroxyurea 2000 milliGRAM(s) Oral every 12 hours      STANDING MEDICATIONS  allopurinol 300 milliGRAM(s) Oral daily  calcium acetate 1334 milliGRAM(s) Oral three times a day with meals  lactated ringers. 1000 milliLiter(s) IV Continuous <Continuous>      PRN MEDICATIONS  acetaminophen     Tablet .. 650 milliGRAM(s) Oral every 6 hours PRN  aluminum hydroxide/magnesium hydroxide/simethicone Suspension 30 milliLiter(s) Oral every 4 hours PRN  melatonin 3 milliGRAM(s) Oral at bedtime PRN  ondansetron Injectable 4 milliGRAM(s) IV Push every 8 hours PRN  sodium chloride 0.65% Nasal 1 Spray(s) Both Nostrils two times a day PRN        Vital Signs Last 24 Hrs  T(C): 36.6 (04 Jun 2024 04:53), Max: 36.9 (03 Jun 2024 09:40)  T(F): 97.8 (04 Jun 2024 04:53), Max: 98.4 (03 Jun 2024 09:40)  HR: 74 (04 Jun 2024 04:53) (74 - 83)  BP: 111/53 (04 Jun 2024 04:53) (103/55 - 118/69)  BP(mean): --  RR: 18 (04 Jun 2024 04:53) (18 - 18)  SpO2: 95% (04 Jun 2024 04:53) (92% - 98%)    Parameters below as of 04 Jun 2024 04:53  Patient On (Oxygen Delivery Method): nasal cannula  O2 Flow (L/min): 2      PHYSICAL EXAM  General: NAD, resting in bed.  HEENT: Clear oropharynx, anicteric sclera, pink conjunctiva  CV: (+) S1/S2 RRR  Lungs: clear to auscultation, no wheezes   Abdomen: soft, non-tender, non-distended (+) BS  Ext: no edema  Skin: no rashes    Neuro: alert and oriented X 3, no focal deficits  Central Line: PIVL            LABS:                8.1    487.38 )-----------( 178      ( 04 Jun 2024 06:51 )                22.2     Mean Cell Volume : 93.7 fl  Mean Cell Hemoglobin : 34.2 pg  Mean Cell Hemoglobin Concentration : 36.5 gm/dL  Auto Neutrophil # : x  Auto Lymphocyte # : x  Auto Monocyte # : x  Auto Eosinophil # : x  Auto Basophil # : x  Auto Neutrophil % : x  Auto Lymphocyte % : x  Auto Monocyte % : x  Auto Eosinophil % : x  Auto Basophil % : x      06-04    140  |  100  |  15  ----------------------------<  91  3.6   |  23  |  0.89    Ca    9.8      04 Jun 2024 06:45  Phos  5.4     06-04  Mg     2.2     06-04    TPro  6.9  /  Alb  4.1  /  TBili  0.7  /  DBili  x   /  AST  37  /  ALT  31  /  AlkPhos  115  06-04    Mg 2.2  Phos 5.4    PT/INR - ( 03 Jun 2024 07:12 )   PT: 14.3 sec;   INR: 1.37 ratio       PTT - ( 02 Jun 2024 09:27 )  PTT:40.1 sec    LDH 1436  Uric Acid 5.4          RECENT CULTURES:  Culture - Blood (06.02.24 @ 04:50)    Specimen Source: .Blood Blood-Peripheral   Culture Results:   No growth at 24 hours    Culture - Blood (06.02.24 @ 04:25)    Specimen Source: .Blood Blood-Peripheral   Culture Results:   No growth at 24 hours    Culture - Urine (06.02.24 @ 02:45)    Specimen Source: Clean Catch Clean Catch (Midstream)   Culture Results:   <10,000 CFU/mL Normal Urogenital [place]        RADIOLOGY & ADDITIONAL STUDIES:  Xray Chest 1 View- PORTABLE-Urgent (Xray Chest 1 View- PORTABLE-Urgent .) (06.02.24 @ 06:25)   Clear lungs. Findings unchanged      Peripheral Blood Flow Cytometry (06.02.24)  Peripheral blood:       - The lymphocyte immunophenotypic findings show no diagnostic abnormalities with lymphopenia.       - The myeloid immunophenotypic findings show an increase in CD34/ positive myeloblasts,  1.5% of cells, with normal immunophenotype, rare monocytes, and myeloid antigen maturation pattern  with marked myeloid left shift and presence of 2% basophils.       -Correlation with cytogenetics, FISH, and molecular studies is necessary.  Please see interpretation.  MORPHOLOGY: Lymphopenia with marked leukocytosis and myeloid left shift, few blasts, no monocytes,  increased eosinophils Diagnosis: R/O Leukemia    Protocol/Chemo Regimen: TBD    Pt endorsed:  +vertigo resolved, but occasional lightheadedness (eating food and taking a nap helps to improve lightheadedness)  +left ear pressure almost fully resolved  +dry nostrils    Review of Systems:   Denies any nausea, vomiting, diarrhea, chest pain, abdominal pain.    Pain scale: Denies    Diet: Regular    Allergies: No Known Allergies    ANTIMICROBIALS      HEME/ONC MEDICATIONS  hydroxyurea 2000 milliGRAM(s) Oral every 12 hours      STANDING MEDICATIONS  allopurinol 300 milliGRAM(s) Oral daily  calcium acetate 1334 milliGRAM(s) Oral three times a day with meals  lactated ringers. 1000 milliLiter(s) IV Continuous <Continuous>      PRN MEDICATIONS  acetaminophen     Tablet .. 650 milliGRAM(s) Oral every 6 hours PRN  aluminum hydroxide/magnesium hydroxide/simethicone Suspension 30 milliLiter(s) Oral every 4 hours PRN  melatonin 3 milliGRAM(s) Oral at bedtime PRN  ondansetron Injectable 4 milliGRAM(s) IV Push every 8 hours PRN  sodium chloride 0.65% Nasal 1 Spray(s) Both Nostrils two times a day PRN        Vital Signs Last 24 Hrs  T(C): 36.6 (04 Jun 2024 04:53), Max: 36.9 (03 Jun 2024 09:40)  T(F): 97.8 (04 Jun 2024 04:53), Max: 98.4 (03 Jun 2024 09:40)  HR: 74 (04 Jun 2024 04:53) (74 - 83)  BP: 111/53 (04 Jun 2024 04:53) (103/55 - 118/69)  BP(mean): --  RR: 18 (04 Jun 2024 04:53) (18 - 18)  SpO2: 95% (04 Jun 2024 04:53) (92% - 98%)    Parameters below as of 04 Jun 2024 04:53  Patient On (Oxygen Delivery Method): nasal cannula  O2 Flow (L/min): 2      PHYSICAL EXAM  General: NAD, resting in bed.  HEENT: Clear oropharynx, anicteric sclera, pink conjunctiva  CV: (+) S1/S2 RRR  Lungs: clear to auscultation, no wheezes   Abdomen: soft, non-tender, non-distended (+) BS  Ext: no edema  Skin: no rashes    Neuro: alert and oriented X 3, no focal deficits  Central Line: PIVL            LABS:                8.1    487.38 )-----------( 178      ( 04 Jun 2024 06:51 )                22.2     Mean Cell Volume : 93.7 fl  Mean Cell Hemoglobin : 34.2 pg  Mean Cell Hemoglobin Concentration : 36.5 gm/dL  Auto Neutrophil # : x  Auto Lymphocyte # : x  Auto Monocyte # : x  Auto Eosinophil # : x  Auto Basophil # : x  Auto Neutrophil % : x  Auto Lymphocyte % : x  Auto Monocyte % : x  Auto Eosinophil % : x  Auto Basophil % : x      06-04    140  |  100  |  15  ----------------------------<  91  3.6   |  23  |  0.89    Ca    9.8      04 Jun 2024 06:45  Phos  5.4     06-04  Mg     2.2     06-04    TPro  6.9  /  Alb  4.1  /  TBili  0.7  /  DBili  x   /  AST  37  /  ALT  31  /  AlkPhos  115  06-04    Mg 2.2  Phos 5.4    PT/INR - ( 03 Jun 2024 07:12 )   PT: 14.3 sec;   INR: 1.37 ratio       PTT - ( 02 Jun 2024 09:27 )  PTT:40.1 sec    LDH 1436  Uric Acid 5.4          RECENT CULTURES:  Culture - Blood (06.02.24 @ 04:50)    Specimen Source: .Blood Blood-Peripheral   Culture Results:   No growth at 24 hours    Culture - Blood (06.02.24 @ 04:25)    Specimen Source: .Blood Blood-Peripheral   Culture Results:   No growth at 24 hours    Culture - Urine (06.02.24 @ 02:45)    Specimen Source: Clean Catch Clean Catch (Midstream)   Culture Results:   <10,000 CFU/mL Normal Urogenital [place]        RADIOLOGY & ADDITIONAL STUDIES:  Xray Chest 1 View- PORTABLE-Urgent (Xray Chest 1 View- PORTABLE-Urgent .) (06.02.24 @ 06:25)   Clear lungs. Findings unchanged      Peripheral Blood Flow Cytometry (06.02.24)  Peripheral blood:       - The lymphocyte immunophenotypic findings show no diagnostic abnormalities with lymphopenia.       - The myeloid immunophenotypic findings show an increase in CD34/ positive myeloblasts,  1.5% of cells, with normal immunophenotype, rare monocytes, and myeloid antigen maturation pattern  with marked myeloid left shift and presence of 2% basophils.       -Correlation with cytogenetics, FISH, and molecular studies is necessary.  Please see interpretation.  MORPHOLOGY: Lymphopenia with marked leukocytosis and myeloid left shift, few blasts, no monocytes,  increased eosinophils Diagnosis: R/O Leukemia, BCR:ABL (+)    Protocol/Chemo Regimen: TBD    Pt endorsed:  +vertigo resolved, but occasional lightheadedness (eating food and taking a nap helps to improve lightheadedness)  +left ear pressure almost fully resolved  +dry nostrils    Review of Systems:   Denies any nausea, vomiting, diarrhea, chest pain, abdominal pain.    Pain scale: Denies    Diet: Regular    Allergies: No Known Allergies    ANTIMICROBIALS      HEME/ONC MEDICATIONS  hydroxyurea 2000 milliGRAM(s) Oral every 12 hours      STANDING MEDICATIONS  allopurinol 300 milliGRAM(s) Oral daily  calcium acetate 1334 milliGRAM(s) Oral three times a day with meals  lactated ringers. 1000 milliLiter(s) IV Continuous <Continuous>      PRN MEDICATIONS  acetaminophen     Tablet .. 650 milliGRAM(s) Oral every 6 hours PRN  aluminum hydroxide/magnesium hydroxide/simethicone Suspension 30 milliLiter(s) Oral every 4 hours PRN  melatonin 3 milliGRAM(s) Oral at bedtime PRN  ondansetron Injectable 4 milliGRAM(s) IV Push every 8 hours PRN  sodium chloride 0.65% Nasal 1 Spray(s) Both Nostrils two times a day PRN        Vital Signs Last 24 Hrs  T(C): 36.6 (04 Jun 2024 04:53), Max: 36.9 (03 Jun 2024 09:40)  T(F): 97.8 (04 Jun 2024 04:53), Max: 98.4 (03 Jun 2024 09:40)  HR: 74 (04 Jun 2024 04:53) (74 - 83)  BP: 111/53 (04 Jun 2024 04:53) (103/55 - 118/69)  BP(mean): --  RR: 18 (04 Jun 2024 04:53) (18 - 18)  SpO2: 95% (04 Jun 2024 04:53) (92% - 98%)    Parameters below as of 04 Jun 2024 04:53  Patient On (Oxygen Delivery Method): nasal cannula  O2 Flow (L/min): 2      PHYSICAL EXAM  General: NAD, resting in bed.  HEENT: Clear oropharynx, anicteric sclera, pink conjunctiva  CV: (+) S1/S2 RRR  Lungs: clear to auscultation, no wheezes   Abdomen: soft, non-tender, non-distended (+) BS  Ext: no edema  Skin: no rashes    Neuro: alert and oriented X 3, no focal deficits  Central Line: PIVL            LABS:                8.1    487.38 )-----------( 178      ( 04 Jun 2024 06:51 )                22.2     Mean Cell Volume : 93.7 fl  Mean Cell Hemoglobin : 34.2 pg  Mean Cell Hemoglobin Concentration : 36.5 gm/dL  Auto Neutrophil # : x  Auto Lymphocyte # : x  Auto Monocyte # : x  Auto Eosinophil # : x  Auto Basophil # : x  Auto Neutrophil % : x  Auto Lymphocyte % : x  Auto Monocyte % : x  Auto Eosinophil % : x  Auto Basophil % : x      06-04    140  |  100  |  15  ----------------------------<  91  3.6   |  23  |  0.89    Ca    9.8      04 Jun 2024 06:45  Phos  5.4     06-04  Mg     2.2     06-04    TPro  6.9  /  Alb  4.1  /  TBili  0.7  /  DBili  x   /  AST  37  /  ALT  31  /  AlkPhos  115  06-04    Mg 2.2  Phos 5.4    PT/INR - ( 03 Jun 2024 07:12 )   PT: 14.3 sec;   INR: 1.37 ratio       PTT - ( 02 Jun 2024 09:27 )  PTT:40.1 sec    LDH 1436  Uric Acid 5.4          RECENT CULTURES:  Culture - Blood (06.02.24 @ 04:50)    Specimen Source: .Blood Blood-Peripheral   Culture Results:   No growth at 24 hours    Culture - Blood (06.02.24 @ 04:25)    Specimen Source: .Blood Blood-Peripheral   Culture Results:   No growth at 24 hours    Culture - Urine (06.02.24 @ 02:45)    Specimen Source: Clean Catch Clean Catch (Midstream)   Culture Results:   <10,000 CFU/mL Normal Urogenital [place]        RADIOLOGY & ADDITIONAL STUDIES:  Xray Chest 1 View- PORTABLE-Urgent (Xray Chest 1 View- PORTABLE-Urgent .) (06.02.24 @ 06:25)   Clear lungs. Findings unchanged      Peripheral Blood Flow Cytometry (06.02.24)  Peripheral blood:       - The lymphocyte immunophenotypic findings show no diagnostic abnormalities with lymphopenia.       - The myeloid immunophenotypic findings show an increase in CD34/ positive myeloblasts,  1.5% of cells, with normal immunophenotype, rare monocytes, and myeloid antigen maturation pattern  with marked myeloid left shift and presence of 2% basophils.       -Correlation with cytogenetics, FISH, and molecular studies is necessary.  Please see interpretation.  MORPHOLOGY: Lymphopenia with marked leukocytosis and myeloid left shift, few blasts, no monocytes,  increased eosinophils Diagnosis: CML - BCR:ABL (+)    Protocol/Chemo Regimen: TBD    Pt endorsed:  +vertigo resolved, but occasional lightheadedness (eating food and taking a nap helps to improve lightheadedness)  +left ear pressure almost fully resolved  +dry nostrils    Review of Systems:   Denies any nausea, vomiting, diarrhea, chest pain, abdominal pain.    Pain scale: Denies    Diet: Regular    Allergies: No Known Allergies    ANTIMICROBIALS      HEME/ONC MEDICATIONS  hydroxyurea 2000 milliGRAM(s) Oral every 12 hours      STANDING MEDICATIONS  allopurinol 300 milliGRAM(s) Oral daily  calcium acetate 1334 milliGRAM(s) Oral three times a day with meals  lactated ringers. 1000 milliLiter(s) IV Continuous <Continuous>      PRN MEDICATIONS  acetaminophen     Tablet .. 650 milliGRAM(s) Oral every 6 hours PRN  aluminum hydroxide/magnesium hydroxide/simethicone Suspension 30 milliLiter(s) Oral every 4 hours PRN  melatonin 3 milliGRAM(s) Oral at bedtime PRN  ondansetron Injectable 4 milliGRAM(s) IV Push every 8 hours PRN  sodium chloride 0.65% Nasal 1 Spray(s) Both Nostrils two times a day PRN        Vital Signs Last 24 Hrs  T(C): 36.6 (04 Jun 2024 04:53), Max: 36.9 (03 Jun 2024 09:40)  T(F): 97.8 (04 Jun 2024 04:53), Max: 98.4 (03 Jun 2024 09:40)  HR: 74 (04 Jun 2024 04:53) (74 - 83)  BP: 111/53 (04 Jun 2024 04:53) (103/55 - 118/69)  BP(mean): --  RR: 18 (04 Jun 2024 04:53) (18 - 18)  SpO2: 95% (04 Jun 2024 04:53) (92% - 98%)    Parameters below as of 04 Jun 2024 04:53  Patient On (Oxygen Delivery Method): nasal cannula  O2 Flow (L/min): 2      PHYSICAL EXAM  General: NAD, resting in bed.  HEENT: Clear oropharynx, anicteric sclera, pink conjunctiva  CV: (+) S1/S2 RRR  Lungs: clear to auscultation, no wheezes   Abdomen: soft, non-tender, non-distended (+) BS  Ext: no edema  Skin: no rashes    Neuro: alert and oriented X 3, no focal deficits  Central Line: PIVL            LABS:                8.1    487.38 )-----------( 178      ( 04 Jun 2024 06:51 )                22.2     Mean Cell Volume : 93.7 fl  Mean Cell Hemoglobin : 34.2 pg  Mean Cell Hemoglobin Concentration : 36.5 gm/dL  Auto Neutrophil # : x  Auto Lymphocyte # : x  Auto Monocyte # : x  Auto Eosinophil # : x  Auto Basophil # : x  Auto Neutrophil % : x  Auto Lymphocyte % : x  Auto Monocyte % : x  Auto Eosinophil % : x  Auto Basophil % : x      06-04    140  |  100  |  15  ----------------------------<  91  3.6   |  23  |  0.89    Ca    9.8      04 Jun 2024 06:45  Phos  5.4     06-04  Mg     2.2     06-04    TPro  6.9  /  Alb  4.1  /  TBili  0.7  /  DBili  x   /  AST  37  /  ALT  31  /  AlkPhos  115  06-04    Mg 2.2  Phos 5.4    PT/INR - ( 03 Jun 2024 07:12 )   PT: 14.3 sec;   INR: 1.37 ratio       PTT - ( 02 Jun 2024 09:27 )  PTT:40.1 sec    LDH 1436  Uric Acid 5.4          RECENT CULTURES:  Culture - Blood (06.02.24 @ 04:50)    Specimen Source: .Blood Blood-Peripheral   Culture Results:   No growth at 24 hours    Culture - Blood (06.02.24 @ 04:25)    Specimen Source: .Blood Blood-Peripheral   Culture Results:   No growth at 24 hours    Culture - Urine (06.02.24 @ 02:45)    Specimen Source: Clean Catch Clean Catch (Midstream)   Culture Results:   <10,000 CFU/mL Normal Urogenital [place]        RADIOLOGY & ADDITIONAL STUDIES:  Xray Chest 1 View- PORTABLE-Urgent (Xray Chest 1 View- PORTABLE-Urgent .) (06.02.24 @ 06:25)   Clear lungs. Findings unchanged      Peripheral Blood Flow Cytometry (06.02.24)  Peripheral blood:       - The lymphocyte immunophenotypic findings show no diagnostic abnormalities with lymphopenia.       - The myeloid immunophenotypic findings show an increase in CD34/ positive myeloblasts,  1.5% of cells, with normal immunophenotype, rare monocytes, and myeloid antigen maturation pattern  with marked myeloid left shift and presence of 2% basophils.       -Correlation with cytogenetics, FISH, and molecular studies is necessary.  Please see interpretation.  MORPHOLOGY: Lymphopenia with marked leukocytosis and myeloid left shift, few blasts, no monocytes,  increased eosinophils Diagnosis: CML - BCR:ABL (+)    Protocol/Chemo Regimen: TBD    Pt endorsed:  +vertigo resolved, but occasional lightheadedness (eating food and taking a nap helps to improve lightheadedness)  +left ear pressure almost fully resolved  +dry nostrils    Review of Systems:   Denies any nausea, vomiting, diarrhea, chest pain, abdominal pain.    Pain scale: Denies    Diet: Regular    Allergies: No Known Allergies    ANTIMICROBIALS      HEME/ONC MEDICATIONS  hydroxyurea 2000 milliGRAM(s) Oral every 12 hours      STANDING MEDICATIONS  allopurinol 300 milliGRAM(s) Oral daily  calcium acetate 1334 milliGRAM(s) Oral three times a day with meals  lactated ringers. 1000 milliLiter(s) IV Continuous <Continuous>      PRN MEDICATIONS  acetaminophen     Tablet .. 650 milliGRAM(s) Oral every 6 hours PRN  aluminum hydroxide/magnesium hydroxide/simethicone Suspension 30 milliLiter(s) Oral every 4 hours PRN  melatonin 3 milliGRAM(s) Oral at bedtime PRN  ondansetron Injectable 4 milliGRAM(s) IV Push every 8 hours PRN  sodium chloride 0.65% Nasal 1 Spray(s) Both Nostrils two times a day PRN        Vital Signs Last 24 Hrs  T(C): 36.6 (04 Jun 2024 04:53), Max: 36.9 (03 Jun 2024 09:40)  T(F): 97.8 (04 Jun 2024 04:53), Max: 98.4 (03 Jun 2024 09:40)  HR: 74 (04 Jun 2024 04:53) (74 - 83)  BP: 111/53 (04 Jun 2024 04:53) (103/55 - 118/69)  BP(mean): --  RR: 18 (04 Jun 2024 04:53) (18 - 18)  SpO2: 95% (04 Jun 2024 04:53) (92% - 98%)    Parameters below as of 04 Jun 2024 04:53  Patient On (Oxygen Delivery Method): nasal cannula  O2 Flow (L/min): 2      PHYSICAL EXAM  General: NAD, resting in bed.  HEENT: Clear oropharynx, anicteric sclera, pink conjunctiva  CV: (+) S1/S2 RRR  Lungs: clear to auscultation, no wheezes   Abdomen: soft, non-tender, non-distended (+) BS  Ext: no edema  Skin: no rashes    Neuro: alert and oriented X 3, no focal deficits  Central Line: PIVL            LABS:                8.1    487.38 )-----------( 178      ( 04 Jun 2024 06:51 )                22.2     Mean Cell Volume : 93.7 fl  Mean Cell Hemoglobin : 34.2 pg  Mean Cell Hemoglobin Concentration : 36.5 gm/dL  Auto Neutrophil # : x  Auto Lymphocyte # : x  Auto Monocyte # : x  Auto Eosinophil # : x  Auto Basophil # : x  Auto Neutrophil % : x  Auto Lymphocyte % : x  Auto Monocyte % : x  Auto Eosinophil % : x  Auto Basophil % : x      06-04    140  |  100  |  15  ----------------------------<  91  3.6   |  23  |  0.89    Ca    9.8      04 Jun 2024 06:45  Phos  5.4     06-04  Mg     2.2     06-04    TPro  6.9  /  Alb  4.1  /  TBili  0.7  /  DBili  x   /  AST  37  /  ALT  31  /  AlkPhos  115  06-04    Mg 2.2  Phos 5.4    PT/INR - ( 03 Jun 2024 07:12 )   PT: 14.3 sec;   INR: 1.37 ratio       PTT - ( 02 Jun 2024 09:27 )  PTT:40.1 sec    LDH 1436  Uric Acid 5.4          RECENT CULTURES:  Culture - Blood (06.02.24 @ 04:50)    Specimen Source: .Blood Blood-Peripheral   Culture Results:   No growth at 24 hours    Culture - Blood (06.02.24 @ 04:25)    Specimen Source: .Blood Blood-Peripheral   Culture Results:   No growth at 24 hours    Culture - Urine (06.02.24 @ 02:45)    Specimen Source: Clean Catch Clean Catch (Midstream)   Culture Results:   <10,000 CFU/mL Normal Urogenital [place]        RADIOLOGY & ADDITIONAL STUDIES:  US Spleen (06.04.24 @ 15:51)   IMPRESSION:  The spleen measures 24.9 cm. On CT 10/23/2019, the spleen measures   approximately 12 cm.    Xray Chest 1 View- PORTABLE-Urgent (Xray Chest 1 View- PORTABLE-Urgent .) (06.02.24 @ 06:25)   Clear lungs. Findings unchanged    Peripheral Blood Flow Cytometry (06.02.24)  Peripheral blood:       - The lymphocyte immunophenotypic findings show no diagnostic abnormalities with lymphopenia.       - The myeloid immunophenotypic findings show an increase in CD34/ positive myeloblasts,  1.5% of cells, with normal immunophenotype, rare monocytes, and myeloid antigen maturation pattern  with marked myeloid left shift and presence of 2% basophils.       -Correlation with cytogenetics, FISH, and molecular studies is necessary.  Please see interpretation.  MORPHOLOGY: Lymphopenia with marked leukocytosis and myeloid left shift, few blasts, no monocytes,  increased eosinophils

## 2024-06-04 NOTE — PROGRESS NOTE ADULT - PROBLEM SELECTOR PLAN 2
Not neutropenic, afebrile  If febrile. pan culture q 48 hrs  6/2 - febrile ~2AM... BCx NGTD Not neutropenic, afebrile  If febrile. pan culture q 48 hrs  Start primary prophylaxis if patient becomes neutropenic   6/2 - febrile ~2AM... BCx NGTD

## 2024-06-04 NOTE — PROGRESS NOTE ADULT - ASSESSMENT
29M with PMH of spontaneous pneumothorax, who presented to [facility] ED with dizziness and vertigo, found to have for profound leukocytosis (WBC > 600) and anemia. Patient transferred to [address] [place] [address] for further workup and management, working to r/o CML. BMBx on 6/3 pending, PB flow 6/2 pending. Course complicated by non neutropenic fever (culture neg). Patient with thrombocytopenia, anemia, and leukocytosis (being managed with hydrea) secondary to disease.     29M with PMH of spontaneous pneumothorax, who presented to [facility] with dizziness and vertigo, found to have for profound leukocytosis (WBC > 600) and anemia. Patient transferred to [address] [place] [address] for further workup and management, working to r/o CML. BMBx on 6/3 pending, PB flow 6/2 pending. Course complicated by non neutropenic fever (culture neg), hypoxia on RA requiring supplemental O2 via NC. Patient with thrombocytopenia, anemia, and leukocytosis (being managed with Hydrea secondary to disease.     29M with PMH of spontaneous pneumothorax, who presented to [facility] ED with dizziness and vertigo, found to have for profound leukocytosis (WBC > 600) and anemia. Patient transferred to [address] [place] [address] for further workup and management, working to r/o CML. BMBx on 6/3 pending, PB FISH (+) BCR:ABL fusion. Course complicated by non neutropenic fever (culture neg), hypoxia on RA requiring supplemental O2 via NC. Patient with thrombocytopenia, anemia, and leukocytosis (being managed with Hydrea secondary to disease.     29M with PMH of spontaneous pneumothorax now with diagnosis of CML. 6/3 BMBx c/w CML, PB FISH (+) BCR:ABL fusion. Sprycel started on 6/4. Course complicated by non neutropenic fever (culture neg), hypoxia on RA requiring supplemental O2 via NC now resolved. Patient with anemia, and leukocytosis (being managed with Hydrea) secondary to disease.

## 2024-06-04 NOTE — PROGRESS NOTE ADULT - PROBLEM SELECTOR PLAN 3
Imaging of head to r/o infarct/lesion as dizziness is persisting.  6/1- CTH (-)  6/1- MRI Head (-)  6/3 vertigo resolved this AM Imaging of head to r/o infarct/lesion as dizziness is persisting.  6/1- CTH (-)  6/1- MRI Head (-)  6/3 vertigo resolved this AM, occasional lightheadedness that improves with sleep and eating food

## 2024-06-04 NOTE — PROGRESS NOTE ADULT - NS ATTEND AMEND GEN_ALL_CORE FT
29M with PMH of spontaneous pneumothorax, who initially presented to [facility] ED with dizziness and vertigo. Labs notable for profound leukocytosis (WBC > 600) and anemia.    Heme: CBC shows , Hgb 7.3, plt 202. WBC differential shows left-shift: 15% bands, 10% metamyelocytes, 36% myelocytes, 4% promyelocytes. No blasts reported. Suspicious for CML but will need BCR-ABL and BMBx to confirm diagnosis.  - Trend CBC with differential twice daily. Continue supportive transfusions as needed to maintain Hgb > 7 and plt > 10  (> 15 if febrile, > 50 if bleeding).  - Trend TLS labs (CMP, Phos, uric acid, LDH) twice daily. Start allopurinol. Consider rasburicase if uric acid > 8.   - Follow up peripheral blood flow and BCR-ABL  - Plan for BMBx today to confirm diagnosis  - Cont hydroxyurea 2 g BID for cytoreduction  - Follow up HIV and hepatitis serologies  - CTH negative. MRI brain also unremarkable 29M with PMH of spontaneous pneumothorax, who initially presented to [facility] ED with dizziness and vertigo. Labs notable for profound leukocytosis (WBC > 600) and anemia.    Heme: CBC shows , Hgb 7.3, plt 202. WBC differential shows left-shift: 15% bands, 10% metamyelocytes, 36% myelocytes, 4% promyelocytes. No blasts reported. Suspicious for CML but will need BCR-ABL and BMBx to confirm diagnosis.  - Trend CBC with differential twice daily. Continue supportive transfusions as needed to maintain Hgb > 7 and plt > 10  (> 15 if febrile, > 50 if bleeding).  - Trend TLS labs (CMP, Phos, uric acid, LDH) twice daily. Start allopurinol. Consider rasburicase if uric acid > 8.   - Follow up peripheral blood flow and BCR-ABL  - BMBx done 6/3 -dry tap  - Cont hydroxyurea 2 g BID for cytoreduction  - Follow up HIV and hepatitis serologies  - CTH negative. MRI brain also unremarkable

## 2024-06-04 NOTE — PHARMACOTHERAPY INTERVENTION NOTE - COMMENTS
Clinical Pharmacy Specialist- Hematology/Oncology- Progress Note    Pt is a 30 y/o male with no significant PMH, presenting with dizziness, vertigo, weight loss, and subsequently found to have profound leukocytosis (WBC > 600) and anemia, now admitted for further management of leukocytosis    Antimicrobial Course:  -None needed currently    Last Neutropenic (ANC<1000): no occurrence  Last Febrile:  no occurrence  Days no longer Neutropenic: 1  Days afebrile: 1    Chemotherapy Course  -Regimen: TBD  -Day:  BmBx: 6/3  Access:     Relevant clinical information used in assessment:  - EF = 67% (6/2)    Assessment/Plan/Recommendation:  - on hydrea 2gm q12hr  - BMBx 6/3 results pending    Additional Monitoring Needed?   -Yes- Continue to monitor renal function & daily counts for abx escalation/de-escalation   --Other medications that may require further renal dose adjustments:*** if CrCl<  -Discharge Planning:  --> New meds:  --> Meds sent for auth:  --> Delivered meds    Case discussed with attending/primary team    Cristine Brizuela, PharmD  PGY-1 Pharmacy Resident  Spectra [phone] or Teams     Priti Maloney, PharmD, BCPS  Clinical Pharmacy Specialist | Hematology/Oncology  [facility]  Email: [email or web address] or available on [employer].    Clinical Pharmacy Specialist- Hematology/Oncology- Progress Note    Pt is a 28 y/o male with no significant PMH, presenting with dizziness, vertigo, weight loss, and subsequently found to have profound leukocytosis (WBC > 600) and anemia, now admitted for further management of leukocytosis    Antimicrobial Course:  -None needed currently    Last Neutropenic (ANC<1000): no occurrence  Last Febrile:  no occurrence  Days no longer Neutropenic: 2  Days afebrile: 2    Chemotherapy Course  -Regimen: TBD  -Day:  BmBx: 6/3  Access:     Relevant clinical information used in assessment:  - EF = 67% (6/2)  - BMBx 6/3 results pending  Assessment/Plan/Recommendation:  - on hydrea 2gm q12hr  Afebrile    Additional Monitoring Needed?   -Yes- Continue to monitor renal function & daily counts for abx escalation/de-escalation   --Other medications that may require further renal dose adjustments:*** if CrCl<  -Discharge Planning:  --> New meds:  --> Meds sent for auth:  --> Delivered meds    Case discussed with attending/primary team    Cristine Brizuela, PharmD  PGY-1 Pharmacy Resident  Spectra [phone] or Teams     Priti Maloney, PharmD, BCPS  Clinical Pharmacy Specialist | Hematology/Oncology  [facility]  Email: [email or web address] or available on [employer]    Clinical Pharmacy Specialist- Hematology/Oncology- Progress Note    Pt is a 30 y/o male with no significant PMH, presenting with dizziness, vertigo, weight loss, and subsequently found to have profound leukocytosis (WBC > 600) and anemia, now admitted for further management of leukocytosis    Antimicrobial Course:  -None needed currently    Last Neutropenic (ANC<1000): no occurrence  Last Febrile:  no occurrence  Days no longer Neutropenic: 2  Days afebrile: 2    Chemotherapy Course  -Regimen: TBD  -Day:  BmBx: 6/3  Access:     Relevant clinical information used in assessment:  - EF = 67% (6/2)  - BMBx 6/3 results pending    Assessment/Plan/Recommendation:  - on hydrea 2gm q12hr  - Afebrile, on 2L O2, c/o some dizziness (no identifiable meds)     Additional Monitoring Needed?   -Yes- Continue to monitor renal function & daily counts for abx escalation/de-escalation   -Discharge Planning:  --> New meds:  --> Meds sent for auth:  --> Delivered meds    Case discussed with attending/primary team    Cristine Brizuela, PharmD  PGY-1 Pharmacy Resident  Spectra [phone] or Teams     Priti Maloney, PharmD, BCPS  Clinical Pharmacy Specialist | Hematology/Oncology  [facility]  Email: [email or web address] or available on [employer]

## 2024-06-04 NOTE — PROGRESS NOTE ADULT - PROBLEM SELECTOR PLAN 5
Hold off on pharmacologic VTE prophylaxis at this time  Encourage OOB and ambulation for VTE ppx  Ocean nasal spray PRN for nasal dryness/nasal congestion Hold off on pharmacologic VTE prophylaxis at this time  Encourage OOB and ambulation for VTE ppx  Ocean nasal spray PRN for nasal dryness/nasal congestion (oxygen humidified)

## 2024-06-04 NOTE — PROGRESS NOTE ADULT - PROBLEM SELECTOR PLAN 1
R/O leukemia - WBC >650k on presentation  Transferred form  CBC shows leukocytosis  6/2- CBC shows left-shift: 15% bands, 10% metamyelocytes, 36% myelocytes.  Monitor tumor lysis labs BID, CBC w/ dif, transfuse blood products and replete electrolytes PRN.  Strict Is/Os, daily weights, diuresis PRN, IVF.  Hepatitis w/u neg, HIV w/u neg, 6/2 G6PD pending   On allopurinol. Consider rasburicase if uric acid > 8.   6/2 - PB flow cytometry sent, follow up. Follow up PB BCR/ABL and FLT3 testing.   6/3 - BMBx, follow up results. Monitor left ear pressure following BMBx from clenching jaw (improving).   6/3 - Phoslo 667 mg PO x 3 doses for hyperphosphatemia, follow up tomorrow AM phos.  6/3 - Titrate oxygen as tolerated, saturating ~92% on 4L NC this AM. Not short of breath per patient.   Continue Hydrea 2 gm Q 12 hrs for leukoreduction R/O leukemia - WBC >650k on presentation  Transferred form  CBC shows leukocytosis  6/2- CBC shows left-shift: 15% bands, 10% metamyelocytes, 36% myelocytes.  Monitor tumor lysis labs BID, CBC w/ dif, transfuse blood products and replete electrolytes PRN.  Strict Is/Os, daily weights, diuresis PRN, IVF.  Hepatitis w/u neg, HIV w/u neg, 6/2 G6PD pending   On allopurinol. Consider rasburicase if uric acid > 8.   6/2 - PB flow cytometry: myeloid immunophenotypic findings show an increase in CD34/ positive myeloblasts,  1.5% of cells, with normal immunophenotype, rare monocytes, and myeloid antigen maturation pattern with marked myeloid left shift and presence of 2% basophils.  6/2 - Follow up PB BCR/ABL and FLT3 testing. FISH pending.   6/3 - BMBx, follow up results.    6/4 - Titrate oxygen as tolerated, saturating ~93% on 2L NC this AM. Not short of breath per patient.   6/4 - Phoslo 1334 mg PO TID x 3 doses for hyperphosphatemia (given lower dose phoslo yesterday without resolution of hyperphosphatemia)  6/4 - leukocytosis improving on hydrea  Continue Hydrea 2 gm Q 12 hrs for leukoreduction R/O leukemia - WBC >650k on presentation  Transferred form  CBC shows leukocytosis  6/2- CBC shows left-shift: 15% bands, 10% metamyelocytes, 36% myelocytes.  Monitor tumor lysis labs BID, CBC w/ dif, transfuse blood products and replete electrolytes PRN.  Strict Is/Os, daily weights, diuresis PRN, IVF.  Hepatitis w/u neg, HIV w/u neg, 6/2 G6PD pending   On allopurinol. Consider rasburicase if uric acid > 8.   6/2 - PB flow cytometry: myeloid immunophenotypic findings show an increase in CD34/ positive myeloblasts,  1.5% of cells, with normal immunophenotype, rare monocytes, and myeloid antigen maturation pattern with marked myeloid left shift and presence of 2% basophils.  6/2 - PB ABNORMAL FISH - Atypical BCR::ABL1 fusion pattern detected (98.5%)  6/3 - BMBx, follow up results.    6/4 - Titrate oxygen as tolerated, saturating ~93% on 2L NC this AM. Not short of breath per patient.   6/4 - Phoslo 1334 mg PO TID x 3 doses for hyperphosphatemia (given lower dose phoslo yesterday without resolution of hyperphosphatemia)  6/4 - leukocytosis improving on hydrea  Continue Hydrea 2 gm Q 12 hrs for leukoreduction R/O leukemia - WBC >650k on presentation  Transferred form  CBC shows leukocytosis  6/2- CBC shows left-shift: 15% bands, 10% metamyelocytes, 36% myelocytes.  Monitor tumor lysis labs BID, CBC w/ dif, transfuse blood products and replete electrolytes PRN.  Strict Is/Os, daily weights, diuresis PRN, IVF.  Hepatitis w/u neg, HIV w/u neg, 6/2 G6PD pending   On allopurinol. Consider rasburicase if uric acid > 8.   6/2 - PB flow cytometry: myeloid immunophenotypic findings show an increase in CD34/ positive myeloblasts,  1.5% of cells, with normal immunophenotype, rare monocytes, and myeloid antigen maturation pattern with marked myeloid left shift and presence of 2% basophils.  6/2 - PB ABNORMAL FISH - Atypical BCR::ABL1 fusion pattern detected (98.5%)  6/3 - BMBx, follow up results.    6/4 - Titrate oxygen as tolerated, saturating ~93% on 2L NC this AM. Not short of breath per patient.   6/4 - Phoslo 1334 mg PO TID x 3 doses for hyperphosphatemia (given lower dose phoslo yesterday without resolution of hyperphosphatemia)  6/4 - leukocytosis improving on hydrea  6/4 discussed +BCR:ABL with Dr. Mendiola - STARTED SPRYCEL (not on any PPIs), patient provided information on Sprycel and educated on CML.   Continue Hydrea 2 gm Q 12 hrs for leukoreduction R/O leukemia - WBC >650k on presentation  Transferred form  CBC shows leukocytosis  6/2- CBC shows left-shift: 15% bands, 10% metamyelocytes, 36% myelocytes.  Monitor tumor lysis labs BID, CBC w/ dif, transfuse blood products and replete electrolytes PRN.  Strict Is/Os, daily weights, diuresis PRN, IVF.  Hepatitis w/u neg, HIV w/u neg, 6/2 G6PD pending   On allopurinol. Consider rasburicase if uric acid > 8.   6/2 - PB flow cytometry: myeloid immunophenotypic findings show an increase in CD34/ positive myeloblasts,  1.5% of cells, with normal immunophenotype, rare monocytes, and myeloid antigen maturation pattern with marked myeloid left shift and presence of 2% basophils.  6/2 - PB ABNORMAL FISH - Atypical BCR::ABL1 fusion pattern detected (98.5%)  6/3 - BMBx, follow up results.    6/4 - Titrate oxygen as tolerated, saturating ~93% on 2L NC this AM. Not short of breath per patient.   6/4 - Phoslo 1334 mg PO TID x 3 doses for hyperphosphatemia (given lower dose phoslo yesterday without resolution of hyperphosphatemia)  6/4 - leukocytosis improving on hydrea  6/4 discussed +BCR:ABL with Dr. Mendiola - STARTED SPRYCEL (not on any PPIs), patient provided information on Sprycel and educated on CML.   6/4 Follow abdominal US for evaluation of spleen size  Continue Hydrea 2 gm Q 12 hrs for leukoreduction R/O leukemia - WBC >650k on presentation  Transferred form  CBC shows leukocytosis  6/2- CBC shows left-shift: 15% bands, 10% metamyelocytes, 36% myelocytes.  Monitor tumor lysis labs BID, CBC w/ dif, transfuse blood products and replete electrolytes PRN.  Strict Is/Os, daily weights, diuresis PRN, IVF.  Hepatitis w/u neg, HIV w/u neg, 6/2 G6PD pending   On allopurinol. Consider rasburicase if uric acid > 8.   6/2 - PB flow cytometry: myeloid immunophenotypic findings show an increase in CD34/ positive myeloblasts,  1.5% of cells, with normal immunophenotype, rare monocytes, and myeloid antigen maturation pattern with marked myeloid left shift and presence of 2% basophils. PB FLT3 (-).  6/2 - PB ABNORMAL FISH - Atypical BCR::ABL1 fusion pattern detected (98.5%)  6/3 - BMBx, follow up results.    6/4 - Titrate oxygen as tolerated, saturating ~93% on 2L NC this AM. Not short of breath per patient.   6/4 - Phoslo 1334 mg PO TID x 3 doses for hyperphosphatemia (given lower dose phoslo yesterday without resolution of hyperphosphatemia)  6/4 - leukocytosis improving on hydrea  6/4 discussed +BCR:ABL with Dr. Mendiola - STARTED SPRYCEL (not on any PPIs), patient provided information on Sprycel and educated on CML.   6/4 Follow abdominal US for evaluation of spleen size  Continue Hydrea 2 gm Q 12 hrs for leukoreduction R/O leukemia - WBC >650k on presentation  Transferred form  CBC shows leukocytosis  6/2- CBC shows left-shift: 15% bands, 10% metamyelocytes, 36% myelocytes.  Monitor tumor lysis labs BID, CBC w/ dif, transfuse blood products and replete electrolytes PRN.  Strict Is/Os, daily weights, diuresis PRN, IVF.  Hepatitis w/u neg, HIV w/u neg, 6/2 G6PD pending   On allopurinol. Consider rasburicase if uric acid > 8.   6/2 - PB flow cytometry: myeloid immunophenotypic findings show an increase in CD34/ positive myeloblasts,  1.5% of cells, with normal immunophenotype, rare monocytes, and myeloid antigen maturation pattern with marked myeloid left shift and presence of 2% basophils. PB FLT3 (-).  6/2 - PB ABNORMAL FISH - Atypical BCR::ABL1 fusion pattern detected (98.5%)  6/3 - BMBx, follow up results.    6/4 - Titrate oxygen as tolerated, saturating ~93% on 2L NC this AM. Not short of breath per patient.   6/4 - Phoslo 1334 mg PO TID x 3 doses for hyperphosphatemia (given lower dose phoslo yesterday without resolution of hyperphosphatemia)  6/4 - leukocytosis improving on hydrea  6/4 discussed +BCR:ABL with Dr. Mendiola - STARTED SPRYCEL (not on any PPIs), patient provided information on Sprycel and educated on CML.   6/4 Abd US for evaluation of spleen size: spleen measures 24.9 cm. On CT 10/23/2019, the spleen measures   approximately 12 cm.  Continue Hydrea 2 gm Q 12 hrs for leukoreduction

## 2024-06-04 NOTE — PROGRESS NOTE ADULT - PROBLEM SELECTOR PLAN 4
6/2 TTE: The left ventricular cavity is normal in size. LVEF 67 %. There are no regional wall motion abnormalities seen. There is normal left ventricular diastolic function. There is increased LV mass and eccentric hypertrophy. Left ventricular global longitudinal strain is -22.4 % which is normal (< -18%). LV Wall Scoring: All segments are normal.  No acute intervention at this time.

## 2024-06-05 LAB
ALBUMIN SERPL ELPH-MCNC: 4.2 G/DL — SIGNIFICANT CHANGE UP (ref 3.3–5)
ALBUMIN SERPL ELPH-MCNC: 4.6 G/DL — SIGNIFICANT CHANGE UP (ref 3.3–5)
ALP SERPL-CCNC: 125 U/L — HIGH (ref 40–120)
ALP SERPL-CCNC: 157 U/L — HIGH (ref 40–120)
ALT FLD-CCNC: 42 U/L — SIGNIFICANT CHANGE UP (ref 10–45)
ALT FLD-CCNC: 49 U/L — HIGH (ref 10–45)
ANION GAP SERPL CALC-SCNC: 14 MMOL/L — SIGNIFICANT CHANGE UP (ref 5–17)
ANION GAP SERPL CALC-SCNC: 18 MMOL/L — HIGH (ref 5–17)
AST SERPL-CCNC: 56 U/L — HIGH (ref 10–40)
AST SERPL-CCNC: 63 U/L — HIGH (ref 10–40)
BASOPHILS # BLD AUTO: 17.45 K/UL — HIGH (ref 0–0.2)
BASOPHILS NFR BLD AUTO: 4 % — HIGH (ref 0–2)
BCR/ABL BY RT - PCR QUANTITATIVE: SIGNIFICANT CHANGE UP
BILIRUB SERPL-MCNC: 0.6 MG/DL — SIGNIFICANT CHANGE UP (ref 0.2–1.2)
BILIRUB SERPL-MCNC: 0.8 MG/DL — SIGNIFICANT CHANGE UP (ref 0.2–1.2)
BLASTS # FLD: 3 % — HIGH (ref 0–0)
BUN SERPL-MCNC: 22 MG/DL — SIGNIFICANT CHANGE UP (ref 7–23)
BUN SERPL-MCNC: 24 MG/DL — HIGH (ref 7–23)
CALCIUM SERPL-MCNC: 10.1 MG/DL — SIGNIFICANT CHANGE UP (ref 8.4–10.5)
CALCIUM SERPL-MCNC: 9.4 MG/DL — SIGNIFICANT CHANGE UP (ref 8.4–10.5)
CHLORIDE SERPL-SCNC: 100 MMOL/L — SIGNIFICANT CHANGE UP (ref 96–108)
CHLORIDE SERPL-SCNC: 99 MMOL/L — SIGNIFICANT CHANGE UP (ref 96–108)
CO2 SERPL-SCNC: 25 MMOL/L — SIGNIFICANT CHANGE UP (ref 22–31)
CO2 SERPL-SCNC: 25 MMOL/L — SIGNIFICANT CHANGE UP (ref 22–31)
CREAT SERPL-MCNC: 0.91 MG/DL — SIGNIFICANT CHANGE UP (ref 0.5–1.3)
CREAT SERPL-MCNC: 0.94 MG/DL — SIGNIFICANT CHANGE UP (ref 0.5–1.3)
EGFR: 113 ML/MIN/1.73M2 — SIGNIFICANT CHANGE UP
EGFR: 117 ML/MIN/1.73M2 — SIGNIFICANT CHANGE UP
EOSINOPHIL # BLD AUTO: 39.26 K/UL — HIGH (ref 0–0.5)
EOSINOPHIL NFR BLD AUTO: 9 % — HIGH (ref 0–6)
GLUCOSE BLDC GLUCOMTR-MCNC: 115 MG/DL — HIGH (ref 70–99)
GLUCOSE SERPL-MCNC: 112 MG/DL — HIGH (ref 70–99)
GLUCOSE SERPL-MCNC: 42 MG/DL — CRITICAL LOW (ref 70–99)
HCT VFR BLD CALC: 21.9 % — LOW (ref 39–50)
HGB BLD-MCNC: 7.8 G/DL — LOW (ref 13–17)
LDH SERPL L TO P-CCNC: 1886 U/L — HIGH (ref 50–242)
LDH SERPL L TO P-CCNC: 2081 U/L — HIGH (ref 50–242)
LYMPHOCYTES # BLD AUTO: 1 % — LOW (ref 13–44)
LYMPHOCYTES # BLD AUTO: 4.36 K/UL — HIGH (ref 1–3.3)
MAGNESIUM SERPL-MCNC: 2.3 MG/DL — SIGNIFICANT CHANGE UP (ref 1.6–2.6)
MAGNESIUM SERPL-MCNC: 2.7 MG/DL — HIGH (ref 1.6–2.6)
MANUAL SMEAR VERIFICATION: SIGNIFICANT CHANGE UP
MCHC RBC-ENTMCNC: 31.7 PG — SIGNIFICANT CHANGE UP (ref 27–34)
MCHC RBC-ENTMCNC: 35.6 GM/DL — SIGNIFICANT CHANGE UP (ref 32–36)
MCV RBC AUTO: 89 FL — SIGNIFICANT CHANGE UP (ref 80–100)
MONOCYTES # BLD AUTO: 4.36 K/UL — HIGH (ref 0–0.9)
MONOCYTES NFR BLD AUTO: 1 % — LOW (ref 2–14)
MYELOCYTES NFR BLD: 26 % — HIGH (ref 0–0)
NEUTROPHILS # BLD AUTO: 244.31 K/UL — HIGH (ref 1.8–7.4)
NEUTROPHILS NFR BLD AUTO: 49 % — SIGNIFICANT CHANGE UP (ref 43–77)
NEUTS BAND # BLD: 7 % — SIGNIFICANT CHANGE UP (ref 0–8)
NRBC # BLD: 5 /100 WBCS — HIGH (ref 0–0)
PHOSPHATE SERPL-MCNC: 5 MG/DL — HIGH (ref 2.5–4.5)
PHOSPHATE SERPL-MCNC: 5.3 MG/DL — HIGH (ref 2.5–4.5)
PLAT MORPH BLD: NORMAL — SIGNIFICANT CHANGE UP
PLATELET # BLD AUTO: 184 K/UL — SIGNIFICANT CHANGE UP (ref 150–400)
POTASSIUM SERPL-MCNC: 3.6 MMOL/L — SIGNIFICANT CHANGE UP (ref 3.5–5.3)
POTASSIUM SERPL-MCNC: 4.5 MMOL/L — SIGNIFICANT CHANGE UP (ref 3.5–5.3)
POTASSIUM SERPL-SCNC: 3.6 MMOL/L — SIGNIFICANT CHANGE UP (ref 3.5–5.3)
POTASSIUM SERPL-SCNC: 4.5 MMOL/L — SIGNIFICANT CHANGE UP (ref 3.5–5.3)
PROT SERPL-MCNC: 7.2 G/DL — SIGNIFICANT CHANGE UP (ref 6–8.3)
PROT SERPL-MCNC: 8 G/DL — SIGNIFICANT CHANGE UP (ref 6–8.3)
RBC # BLD: 2.46 M/UL — LOW (ref 4.2–5.8)
RBC # FLD: 19 % — HIGH (ref 10.3–14.5)
RBC BLD AUTO: SIGNIFICANT CHANGE UP
SODIUM SERPL-SCNC: 139 MMOL/L — SIGNIFICANT CHANGE UP (ref 135–145)
SODIUM SERPL-SCNC: 142 MMOL/L — SIGNIFICANT CHANGE UP (ref 135–145)
URATE SERPL-MCNC: 4.5 MG/DL — SIGNIFICANT CHANGE UP (ref 3.4–8.8)
URATE SERPL-MCNC: 5.3 MG/DL — SIGNIFICANT CHANGE UP (ref 3.4–8.8)
WBC # BLD: 436.26 K/UL — CRITICAL HIGH (ref 3.8–10.5)
WBC # FLD AUTO: 436.26 K/UL — CRITICAL HIGH (ref 3.8–10.5)

## 2024-06-05 PROCEDURE — 99232 SBSQ HOSP IP/OBS MODERATE 35: CPT | Mod: GC

## 2024-06-05 RX ORDER — DASATINIB 80 MG/1
1 TABLET ORAL
Qty: 30 | Refills: 5
Start: 2024-06-05 | End: 2024-12-01

## 2024-06-05 RX ORDER — CALCIUM ACETATE 667 MG
1334 TABLET ORAL
Refills: 0 | Status: COMPLETED | OUTPATIENT
Start: 2024-06-05 | End: 2024-06-07

## 2024-06-05 RX ORDER — ALLOPURINOL 300 MG
1 TABLET ORAL
Qty: 10 | Refills: 0
Start: 2024-06-05 | End: 2024-06-14

## 2024-06-05 RX ORDER — DASATINIB 80 MG/1
1 TABLET ORAL
Qty: 0 | Refills: 0 | DISCHARGE
Start: 2024-06-05

## 2024-06-05 RX ORDER — DASATINIB 80 MG/1
100 TABLET ORAL DAILY
Refills: 0 | Status: DISCONTINUED | OUTPATIENT
Start: 2024-06-05 | End: 2024-06-08

## 2024-06-05 RX ADMIN — Medication 300 MILLIGRAM(S): at 12:07

## 2024-06-05 RX ADMIN — Medication 1334 MILLIGRAM(S): at 12:08

## 2024-06-05 RX ADMIN — Medication 650 MILLIGRAM(S): at 17:58

## 2024-06-05 RX ADMIN — HYDROXYUREA 2000 MILLIGRAM(S): 500 CAPSULE ORAL at 22:00

## 2024-06-05 RX ADMIN — Medication 650 MILLIGRAM(S): at 18:30

## 2024-06-05 RX ADMIN — SODIUM CHLORIDE 100 MILLILITER(S): 9 INJECTION, SOLUTION INTRAVENOUS at 17:51

## 2024-06-05 RX ADMIN — DASATINIB 100 MILLIGRAM(S): 80 TABLET ORAL at 12:06

## 2024-06-05 RX ADMIN — Medication 1334 MILLIGRAM(S): at 17:22

## 2024-06-05 RX ADMIN — HYDROXYUREA 2000 MILLIGRAM(S): 500 CAPSULE ORAL at 10:04

## 2024-06-05 NOTE — PHARMACOTHERAPY INTERVENTION NOTE - COMMENTS
Clinical Pharmacy Specialist- Hematology/Oncology- Progress Note    Pt is a 28 y/o male with no significant PMH, presenting with dizziness, vertigo, weight loss, and subsequently found to have profound leukocytosis (WBC > 600) and anemia, now admitted for further management of leukocytosis    Antimicrobial Course:  -None needed currently    Last Neutropenic (ANC<1000): no occurrence  Last Febrile:  no occurrence  Days no longer Neutropenic: 3  Days afebrile: 3    Chemotherapy Course  -Regimen: dasatinib 100mg  -Day: 2  BmBx: 6/3  Access: peripheral    Relevant clinical information used in assessment:  - EF = 67% (6/2)  - BMBx 6/3 results pending    Assessment/Plan/Recommendation:  - on hydrea 2gm q12hr  - Afebrile, on 2L O2, c/o some dizziness (no identifiable meds)  6/5-Started dasatinib for CML treatment yesterday (of note 140mg was changed to 100mg due to indication in dosing). Will  and educate on new medication once cytogenetic markers are completely back to determine addition of target-specific therapy.    Additional Monitoring Needed?   -Yes- Continue to monitor renal function & daily counts for abx escalation/de-escalation   -Discharge Planning:  --> New meds:  --> Meds sent for auth:  --> Delivered meds    Case discussed with attending/primary team    Cristine Brizuela, PharmD  PGY-1 Pharmacy Resident  Spectra [phone] or Teams     Priti Maloney, PharmD, BCPS  Clinical Pharmacy Specialist | Hematology/Oncology  [facility]  Email: [email or web address] or available on [employer]  Clinical Pharmacy Specialist- Hematology/Oncology- Progress Note    Pt is a 30 y/o male with no significant PMH, presenting with dizziness, vertigo, weight loss, and subsequently found to have profound leukocytosis (WBC > 600) and anemia, now admitted for further management of leukocytosis    Antimicrobial Course:  -None needed currently    Last Neutropenic (ANC<1000): no occurrence  Last Febrile:  no occurrence  Days no longer Neutropenic: 3  Days afebrile: 3    Chemotherapy Course  -Regimen: Dasatinib 100mg PO daily  -Day: 2 (6/5)  BmBx: 6/3  Access: peripheral    Relevant clinical information used in assessment:  - EF = 67% (6/2)  - BMBx 6/3 results pending    Assessment/Plan/Recommendation:  - on hydrea 2gm q12hr  - Afebrile, on 2L O2, c/o some dizziness (no identifiable meds)  6/5-Started dasatinib for CML treatment yesterday (of note 140mg wa-s changed to 100mg due to indication in dosing). - Will  and educate on new medications at discharge  -Discharge Planning:  --> Meds sent for auth: allopurinol, zofran, fluconazole, levaquin, dasatinib- sent to [facility], delivery next week, sprycel cash pay x 7 days?    Additional Monitoring Needed?   -Yes- Continue to monitor renal function & daily counts for abx escalation/de-escalation   -Discharge Planning:  --> New meds:  allopurinol, zofran, fluconazole, levaquin, dasatinib-  --> Meds sent for auth: allopurinol, zofran, fluconazole, levaquin, dasatinib- sent to [facility], delivery next week, sprycel cash pay x 7 days?  --> Delivered meds    Case discussed with attending/primary team    Cristine Brizuela, PharmD  PGY-1 Pharmacy Resident  Spectra [phone] or Teams     Priti Maloney, PharmD, BCPS  Clinical Pharmacy Specialist | Hematology/Oncology  [facility]  Email: [email or web address] or available on [employer]  Clinical Pharmacy Specialist- Hematology/Oncology- Progress Note    Pt is a 30 y/o male with no significant PMH, presenting with dizziness, vertigo, weight loss, and subsequently found to have profound leukocytosis (WBC > 600) and anemia, admitted treatment of newly diagnosed CML     Antimicrobial Course:  -None needed currently    Last Neutropenic (ANC<1000): no occurrence  Last Febrile:  no occurrence  Days no longer Neutropenic: 3  Days afebrile: 3    Chemotherapy Course  -Regimen: Dasatinib 100mg PO daily  -Day: 2 (6/5)  BmBx: 6/3  Access: peripheral    Relevant clinical information used in assessment:  - EF = 67% (6/2)  - BMBx 6/3 results pending    Assessment/Plan/Recommendation:  - on hydrea 2gm q12hr  - Afebrile, on 2L O2, c/o some dizziness (no identifiable meds)  6/5-Started dasatinib for CML treatment yesterday (of note 140mg wa-s changed to 100mg due to indication in dosing). - Will  and educate on new medications at discharge  -Discharge Planning:  --> Meds sent for auth: allopurinol, zofran, fluconazole, levaquin, valtrex, dasatinib- sent to [facility], delivery next week, sprycel cash pay x 7 days?    Additional Monitoring Needed?   -Yes- Continue to monitor renal function & daily counts for abx escalation/de-escalation   -Discharge Planning:  --> New meds:  allopurinol, zofran, fluconazole, levaquin, valtrex, dasatinib-  --> Meds sent for auth: allopurinol, zofran, fluconazole, levaquin, valtrex dasatinib- sent to [facility], delivery next week, sprycel cash pay x 7 days?  --> Delivered meds    Case discussed with attending/primary team    Cristine Brizuela, PharmD  PGY-1 Pharmacy Resident  Spectra [phone] or Teams     Priti Maloney, PharmD, BCPS  Clinical Pharmacy Specialist | Hematology/Oncology  [facility]  Email: [email or web address] or available on [employer]

## 2024-06-05 NOTE — PROGRESS NOTE ADULT - PROBLEM SELECTOR PLAN 5
Hold off on pharmacologic VTE prophylaxis at this time  Encourage OOB and ambulation for VTE ppx  Ocean nasal spray PRN for nasal dryness/nasal congestion (oxygen humidified)

## 2024-06-05 NOTE — PROGRESS NOTE ADULT - NS ATTEND AMEND GEN_ALL_CORE FT
29M with PMH of spontaneous pneumothorax, who initially presented to [place] ED with dizziness and vertigo. Labs notable for profound leukocytosis (WBC > 600) and anemia.    Heme: CBC shows , Hgb 7.3, plt 202. WBC differential shows left-shift: 15% bands, 10% metamyelocytes, 36% myelocytes, 4% promyelocytes. No blasts reported. Suspicious for CML but will need BCR-ABL and BMBx to confirm diagnosis.  - Trend CBC with differential twice daily. Continue supportive transfusions as needed to maintain Hgb > 7 and plt > 10  (> 15 if febrile, > 50 if bleeding).  - Trend TLS labs (CMP, Phos, uric acid, LDH) twice daily. Start allopurinol. Consider rasburicase if uric acid > 8.   - Follow up peripheral blood flow and BCR-ABL  - BMBx done 6/3 -dry tap  - Cont hydroxyurea 2 g BID for cytoreduction  - Follow up HIV and hepatitis serologies  - CTH negative. MRI brain also unremarkable 29M with PMH of spontaneous pneumothorax, who initially presented to [place] ED with dizziness and vertigo. Labs notable for profound leukocytosis (WBC > 600) and anemia.    Heme: CBC shows , Hgb 7.3, plt 202. WBC differential shows left-shift: 15% bands, 10% metamyelocytes, 36% myelocytes, 4% promyelocytes. No blasts reported. Suspicious for CML but will need BCR-ABL and BMBx to confirm diagnosis.  - Trend CBC with differential twice daily. Continue supportive transfusions as needed to maintain Hgb > 7 and plt > 10  (> 15 if febrile, > 50 if bleeding).  - Trend TLS labs (CMP, Phos, uric acid, LDH) twice daily. Start allopurinol. Consider rasburicase if uric acid > 8.   - peripheral blood flow w/ 1.5% blasts and BCR-ABL FISH positive for atypical BCR::ABL1 fusion pattern detected (98.5%)  - BMBx done 6/3 -dry tap  - US spleen showing spleen size 24.9cm  - Started Sprycel on 6/4/24  - Cont hydroxyurea 2 g BID for cytoreduction  - Follow up HIV and hepatitis serologies  - CTH negative. MRI brain also unremarkable

## 2024-06-05 NOTE — PROGRESS NOTE ADULT - PROBLEM SELECTOR PLAN 2
Not neutropenic, afebrile  If febrile. pan culture q 48 hrs  Start primary prophylaxis if patient becomes neutropenic   6/2 - febrile ~2AM... BCx NGTD Not neutropenic, afebrile  If febrile. pan culture q 48 hrs  Start primary prophylaxis if patient becomes neutropenic   6/2 BCx NGTD

## 2024-06-05 NOTE — PROGRESS NOTE ADULT - SUBJECTIVE AND OBJECTIVE BOX
Diagnosis: CML - BCR:ABL (+)    Protocol/Chemo Regimen: TBD    Pt endorsed:  +vertigo resolved, but occasional lightheadedness (eating food and taking a nap helps to improve lightheadedness)  +left ear pressure almost fully resolved  +dry nostrils    Review of Systems:   Denies any nausea, vomiting, diarrhea, chest pain, abdominal pain.    Pain scale: Denies    Diet: Regular    Allergies    No Known Allergies    Intolerances    HEME/ONC MEDICATIONS  dasatinib 140 milliGRAM(s) Oral daily  hydroxyurea 2000 milliGRAM(s) Oral every 12 hours      STANDING MEDICATIONS  allopurinol 300 milliGRAM(s) Oral daily  lactated ringers. 1000 milliLiter(s) IV Continuous <Continuous>      PRN MEDICATIONS  acetaminophen     Tablet .. 650 milliGRAM(s) Oral every 6 hours PRN  aluminum hydroxide/magnesium hydroxide/simethicone Suspension 30 milliLiter(s) Oral every 4 hours PRN  melatonin 3 milliGRAM(s) Oral at bedtime PRN  ondansetron Injectable 4 milliGRAM(s) IV Push every 8 hours PRN  sodium chloride 0.65% Nasal 1 Spray(s) Both Nostrils two times a day PRN      Vital Signs Last 24 Hrs  T(C): 37 (05 Jun 2024 05:22), Max: 37.7 (04 Jun 2024 21:01)  T(F): 98.6 (05 Jun 2024 05:22), Max: 99.8 (04 Jun 2024 21:01)  HR: 81 (05 Jun 2024 05:22) (77 - 101)  BP: 113/64 (05 Jun 2024 05:22) (99/63 - 132/78)  BP(mean): --  RR: 18 (05 Jun 2024 05:22) (18 - 18)  SpO2: 95% (05 Jun 2024 05:22) (93% - 95%)    Parameters below as of 05 Jun 2024 05:22  Patient On (Oxygen Delivery Method): nasal cannula  O2 Flow (L/min): 2     PHYSICAL EXAM  General: NAD, resting in bed.  HEENT: Clear oropharynx, anicteric sclera, pink conjunctiva  CV: (+) S1/S2 RRR  Lungs: clear to auscultation, no wheezes   Abdomen: soft, non-tender, non-distended (+) BS  Ext: no edema  Skin: no rashes    Neuro: alert and oriented X 3, no focal deficits  Central Line: PIVL    LABS:                       Diagnosis: CML - BCR:ABL (+)    Protocol/Chemo Regimen: TBD    Pt endorsed:  +vertigo resolved, but occasional lightheadedness   mild short of breath today    Review of Systems:   Denies any nausea, vomiting, diarrhea, chest pain, abdominal pain.    Pain scale: Denies    Diet: Regular    Allergies    No Known Allergies    Intolerances    HEME/ONC MEDICATIONS  dasatinib 140 milliGRAM(s) Oral daily  hydroxyurea 2000 milliGRAM(s) Oral every 12 hours      STANDING MEDICATIONS  allopurinol 300 milliGRAM(s) Oral daily  lactated ringers. 1000 milliLiter(s) IV Continuous <Continuous>      PRN MEDICATIONS  acetaminophen     Tablet .. 650 milliGRAM(s) Oral every 6 hours PRN  aluminum hydroxide/magnesium hydroxide/simethicone Suspension 30 milliLiter(s) Oral every 4 hours PRN  melatonin 3 milliGRAM(s) Oral at bedtime PRN  ondansetron Injectable 4 milliGRAM(s) IV Push every 8 hours PRN  sodium chloride 0.65% Nasal 1 Spray(s) Both Nostrils two times a day PRN      Vital Signs Last 24 Hrs  T(C): 37 (05 Jun 2024 05:22), Max: 37.7 (04 Jun 2024 21:01)  T(F): 98.6 (05 Jun 2024 05:22), Max: 99.8 (04 Jun 2024 21:01)  HR: 81 (05 Jun 2024 05:22) (77 - 101)  BP: 113/64 (05 Jun 2024 05:22) (99/63 - 132/78)  BP(mean): --  RR: 18 (05 Jun 2024 05:22) (18 - 18)  SpO2: 95% (05 Jun 2024 05:22) (93% - 95%)    Parameters below as of 05 Jun 2024 05:22  Patient On (Oxygen Delivery Method): nasal cannula  O2 Flow (L/min): 2     PHYSICAL EXAM  General: NAD, resting in bed.  HEENT: Clear oropharynx, anicteric sclera, pink conjunctiva  CV: (+) S1/S2 RRR  Lungs: clear to auscultation, no wheezes   Abdomen: soft, non-tender, non-distended (+) BS  Ext: no edema  Skin: no rashes    Neuro: alert and oriented X 3, no focal deficits  Central Line: PIVL    LABS:                     7.8    436.26 )-----------( 184      ( 05 Jun 2024 08:35 )             21.9         Mean Cell Volume : 89.0 fl  Mean Cell Hemoglobin : 31.7 pg  Mean Cell Hemoglobin Concentration : 35.6 gm/dL  Auto Neutrophil # : 244.31 K/uL  Auto Lymphocyte # : 4.36 K/uL  Auto Monocyte # : 4.36 K/uL  Auto Eosinophil # : 39.26 K/uL  Auto Basophil # : 17.45 K/uL  Auto Neutrophil % : 49.0 %  Auto Lymphocyte % : 1.0 %  Auto Monocyte % : 1.0 %  Auto Eosinophil % : 9.0 %  Auto Basophil % : 4.0 %      06-05    139  |  100  |  22  ----------------------------<  112<H>  3.6   |  25  |  0.91    Ca    9.4      05 Jun 2024 08:35  Phos  5.0     06-05  Mg     2.3     06-05    TPro  7.2  /  Alb  4.2  /  TBili  0.6  /  DBili  x   /  AST  56<H>  /  ALT  42  /  AlkPhos  125<H>  06-05  LDH 1886  Uric Acid 5.3

## 2024-06-05 NOTE — CHART NOTE - NSCHARTNOTEFT_GEN_A_CORE
Hem/Onc Night PA Note    Notified by RN of critical value blood glucose 42. Patient seen and examined at bedside. Patient reports no complaints and is talking with family at bedside. Patient denies dizziness, visual changes, headache, weakness. FS done and was 115. No intervention at this time. Hem/Onc Night PA Note    Notified by RN of critical value blood glucose 42. Patient seen and examined at bedside. Patient is alert, A&0 x4 and responding appropriately. Patient reports no complaints and is talking with family at bedside. Patient denies dizziness, visual changes, headache, weakness. FS done and was 115. No intervention at this time.

## 2024-06-05 NOTE — PROGRESS NOTE ADULT - PROBLEM SELECTOR PLAN 1
R/O leukemia - WBC >650k on presentation  Transferred form  CBC shows leukocytosis  6/2- CBC shows left-shift: 15% bands, 10% metamyelocytes, 36% myelocytes.  Monitor tumor lysis labs BID, CBC w/ dif, transfuse blood products and replete electrolytes PRN.  Strict Is/Os, daily weights, diuresis PRN, IVF.  Hepatitis w/u neg, HIV w/u neg, 6/2 G6PD pending   On allopurinol. Consider rasburicase if uric acid > 8.   6/2 - PB flow cytometry: myeloid immunophenotypic findings show an increase in CD34/ positive myeloblasts,  1.5% of cells, with normal immunophenotype, rare monocytes, and myeloid antigen maturation pattern with marked myeloid left shift and presence of 2% basophils. PB FLT3 (-).  6/2 - PB ABNORMAL FISH - Atypical BCR::ABL1 fusion pattern detected (98.5%)  6/3 - BMBx, follow up results.    6/4 - Titrate oxygen as tolerated, saturating ~93% on 2L NC this AM. Not short of breath per patient.   6/4 - Phoslo 1334 mg PO TID x 3 doses for hyperphosphatemia (given lower dose phoslo yesterday without resolution of hyperphosphatemia)  6/4 - leukocytosis improving on hydrea  6/4 discussed +BCR:ABL with Dr. Mendiola - STARTED SPRYCEL (not on any PPIs), patient provided information on Sprycel and educated on CML.   6/4 Abd US for evaluation of spleen size: spleen measures 24.9 cm. On CT 10/23/2019, the spleen measures   approximately 12 cm.  Continue Hydrea 2 gm Q 12 hrs for leukoreduction Transferred form    Monitor tumor lysis labs BID, CBC w/ dif, transfuse blood products and replete electrolytes PRN.  Strict Is/Os, daily weights, diuresis PRN, IVF.  Hepatitis w/u neg, HIV w/u neg, 6/2 G6PD pending   On allopurinol. Consider rasburicase if uric acid > 8.   6/2 - PB flow cytometry: myeloid immunophenotypic findings show an increase in CD34/ positive myeloblasts,  1.5% of cells, with normal immunophenotype, rare monocytes, and myeloid antigen maturation pattern with marked myeloid left shift and presence of 2% basophils. PB FLT3 (-).  6/2 - PB ABNORMAL FISH - Atypical BCR::ABL1 fusion pattern detected (98.5%)  6/3 - BMBx, follow up results.    6/5- Phoslo for hyperphosphatemia  6/4 - leukocytosis improving on hydrea continue for now.   6/4 STARTED SPRYCEL 100mg (not on any PPIs), patient provided information on Sprycel and educated on CML.   6/4 Abd US for evaluation of spleen size: spleen measures 24.9 cm.  Symptomatic anemia-replace prbc.

## 2024-06-05 NOTE — PROGRESS NOTE ADULT - PROBLEM SELECTOR PLAN 3
Imaging of head to r/o infarct/lesion as dizziness is persisting.  6/1- CTH (-)  6/1- MRI Head (-)  6/3 vertigo resolved this AM, occasional lightheadedness that improves with sleep and eating food Imaging of head to r/o infarct/lesion as dizziness is persisting.  6/1- CTH (-)  6/1- MRI Head (-)

## 2024-06-05 NOTE — PROGRESS NOTE ADULT - ASSESSMENT
29M with PMH of spontaneous pneumothorax, who presented to [place] ED with dizziness and vertigo, found to have for profound leukocytosis (WBC > 600) and anemia. Patient transferred to [address] [place] [address] for further workup and management, working to r/o CML. BMBx on 6/3 pending, PB FISH (+) BCR:ABL fusion. Course complicated by non neutropenic fever (culture neg), hypoxia on RA requiring supplemental O2 via NC. Patient with thrombocytopenia, anemia, and leukocytosis (being managed with Hydrea secondary to disease.

## 2024-06-06 LAB
ALBUMIN SERPL ELPH-MCNC: 4.4 G/DL — SIGNIFICANT CHANGE UP (ref 3.3–5)
ALBUMIN SERPL ELPH-MCNC: 4.5 G/DL — SIGNIFICANT CHANGE UP (ref 3.3–5)
ALP SERPL-CCNC: 151 U/L — HIGH (ref 40–120)
ALP SERPL-CCNC: 167 U/L — HIGH (ref 40–120)
ALT FLD-CCNC: 49 U/L — HIGH (ref 10–45)
ALT FLD-CCNC: 52 U/L — HIGH (ref 10–45)
ANION GAP SERPL CALC-SCNC: 14 MMOL/L — SIGNIFICANT CHANGE UP (ref 5–17)
ANION GAP SERPL CALC-SCNC: 18 MMOL/L — HIGH (ref 5–17)
AST SERPL-CCNC: 55 U/L — HIGH (ref 10–40)
AST SERPL-CCNC: 59 U/L — HIGH (ref 10–40)
BASOPHILS # BLD AUTO: 0 K/UL — SIGNIFICANT CHANGE UP (ref 0–0.2)
BASOPHILS NFR BLD AUTO: 0 % — SIGNIFICANT CHANGE UP (ref 0–2)
BILIRUB SERPL-MCNC: 0.7 MG/DL — SIGNIFICANT CHANGE UP (ref 0.2–1.2)
BILIRUB SERPL-MCNC: 0.9 MG/DL — SIGNIFICANT CHANGE UP (ref 0.2–1.2)
BLASTS # FLD: 3 % — HIGH (ref 0–0)
BUN SERPL-MCNC: 24 MG/DL — HIGH (ref 7–23)
BUN SERPL-MCNC: 25 MG/DL — HIGH (ref 7–23)
CALCIUM SERPL-MCNC: 9.8 MG/DL — SIGNIFICANT CHANGE UP (ref 8.4–10.5)
CALCIUM SERPL-MCNC: 9.9 MG/DL — SIGNIFICANT CHANGE UP (ref 8.4–10.5)
CHLORIDE SERPL-SCNC: 101 MMOL/L — SIGNIFICANT CHANGE UP (ref 96–108)
CHLORIDE SERPL-SCNC: 102 MMOL/L — SIGNIFICANT CHANGE UP (ref 96–108)
CO2 SERPL-SCNC: 21 MMOL/L — LOW (ref 22–31)
CO2 SERPL-SCNC: 24 MMOL/L — SIGNIFICANT CHANGE UP (ref 22–31)
CREAT SERPL-MCNC: 0.86 MG/DL — SIGNIFICANT CHANGE UP (ref 0.5–1.3)
CREAT SERPL-MCNC: 0.89 MG/DL — SIGNIFICANT CHANGE UP (ref 0.5–1.3)
EGFR: 119 ML/MIN/1.73M2 — SIGNIFICANT CHANGE UP
EGFR: 120 ML/MIN/1.73M2 — SIGNIFICANT CHANGE UP
EOSINOPHIL # BLD AUTO: 51.89 K/UL — HIGH (ref 0–0.5)
EOSINOPHIL NFR BLD AUTO: 13 % — HIGH (ref 0–6)
GLUCOSE SERPL-MCNC: 102 MG/DL — HIGH (ref 70–99)
GLUCOSE SERPL-MCNC: 106 MG/DL — HIGH (ref 70–99)
HCT VFR BLD CALC: 26.3 % — LOW (ref 39–50)
HEMATOPATHOLOGY REPORT: SIGNIFICANT CHANGE UP
HGB BLD-MCNC: 9 G/DL — LOW (ref 13–17)
INR BLD: 1.37 RATIO — HIGH (ref 0.85–1.18)
LDH SERPL L TO P-CCNC: 1630 U/L — HIGH (ref 50–242)
LDH SERPL L TO P-CCNC: 1972 U/L — HIGH (ref 50–242)
LYMPHOCYTES # BLD AUTO: 1 % — LOW (ref 13–44)
LYMPHOCYTES # BLD AUTO: 3.99 K/UL — HIGH (ref 1–3.3)
MAGNESIUM SERPL-MCNC: 2.3 MG/DL — SIGNIFICANT CHANGE UP (ref 1.6–2.6)
MAGNESIUM SERPL-MCNC: 2.4 MG/DL — SIGNIFICANT CHANGE UP (ref 1.6–2.6)
MANUAL SMEAR VERIFICATION: SIGNIFICANT CHANGE UP
MCHC RBC-ENTMCNC: 32.8 PG — SIGNIFICANT CHANGE UP (ref 27–34)
MCHC RBC-ENTMCNC: 34.2 GM/DL — SIGNIFICANT CHANGE UP (ref 32–36)
MCV RBC AUTO: 96 FL — SIGNIFICANT CHANGE UP (ref 80–100)
METAMYELOCYTES # FLD: 10 % — HIGH (ref 0–0)
MONOCYTES # BLD AUTO: 3.99 K/UL — HIGH (ref 0–0.9)
MONOCYTES NFR BLD AUTO: 1 % — LOW (ref 2–14)
MYELOCYTES NFR BLD: 20 % — HIGH (ref 0–0)
NEUTROPHILS # BLD AUTO: 179.64 K/UL — HIGH (ref 1.8–7.4)
NEUTROPHILS NFR BLD AUTO: 43 % — SIGNIFICANT CHANGE UP (ref 43–77)
NEUTS BAND # BLD: 2 % — SIGNIFICANT CHANGE UP (ref 0–8)
NRBC # BLD: 2 /100 WBCS — HIGH (ref 0–0)
PHOSPHATE SERPL-MCNC: 3.9 MG/DL — SIGNIFICANT CHANGE UP (ref 2.5–4.5)
PHOSPHATE SERPL-MCNC: 4.9 MG/DL — HIGH (ref 2.5–4.5)
PLAT MORPH BLD: NORMAL — SIGNIFICANT CHANGE UP
PLATELET # BLD AUTO: 187 K/UL — SIGNIFICANT CHANGE UP (ref 150–400)
POTASSIUM SERPL-MCNC: 3.7 MMOL/L — SIGNIFICANT CHANGE UP (ref 3.5–5.3)
POTASSIUM SERPL-MCNC: 3.8 MMOL/L — SIGNIFICANT CHANGE UP (ref 3.5–5.3)
POTASSIUM SERPL-SCNC: 3.7 MMOL/L — SIGNIFICANT CHANGE UP (ref 3.5–5.3)
POTASSIUM SERPL-SCNC: 3.8 MMOL/L — SIGNIFICANT CHANGE UP (ref 3.5–5.3)
PROMYELOCYTES # FLD: 7 % — HIGH (ref 0–0)
PROT SERPL-MCNC: 7.4 G/DL — SIGNIFICANT CHANGE UP (ref 6–8.3)
PROT SERPL-MCNC: 7.7 G/DL — SIGNIFICANT CHANGE UP (ref 6–8.3)
PROTHROM AB SERPL-ACNC: 14.9 SEC — HIGH (ref 9.5–13)
RBC # BLD: 2.74 M/UL — LOW (ref 4.2–5.8)
RBC # FLD: 19.1 % — HIGH (ref 10.3–14.5)
RBC BLD AUTO: SIGNIFICANT CHANGE UP
SODIUM SERPL-SCNC: 140 MMOL/L — SIGNIFICANT CHANGE UP (ref 135–145)
SODIUM SERPL-SCNC: 140 MMOL/L — SIGNIFICANT CHANGE UP (ref 135–145)
URATE SERPL-MCNC: 4.2 MG/DL — SIGNIFICANT CHANGE UP (ref 3.4–8.8)
URATE SERPL-MCNC: 5 MG/DL — SIGNIFICANT CHANGE UP (ref 3.4–8.8)
WBC # BLD: 399.19 K/UL — CRITICAL HIGH (ref 3.8–10.5)
WBC # FLD AUTO: 399.19 K/UL — CRITICAL HIGH (ref 3.8–10.5)

## 2024-06-06 PROCEDURE — 99232 SBSQ HOSP IP/OBS MODERATE 35: CPT | Mod: GC

## 2024-06-06 RX ADMIN — Medication 650 MILLIGRAM(S): at 17:49

## 2024-06-06 RX ADMIN — Medication 1334 MILLIGRAM(S): at 12:07

## 2024-06-06 RX ADMIN — HYDROXYUREA 2000 MILLIGRAM(S): 500 CAPSULE ORAL at 09:09

## 2024-06-06 RX ADMIN — Medication 1334 MILLIGRAM(S): at 09:10

## 2024-06-06 RX ADMIN — Medication 650 MILLIGRAM(S): at 18:19

## 2024-06-06 RX ADMIN — HYDROXYUREA 2000 MILLIGRAM(S): 500 CAPSULE ORAL at 21:56

## 2024-06-06 RX ADMIN — SODIUM CHLORIDE 100 MILLILITER(S): 9 INJECTION, SOLUTION INTRAVENOUS at 02:45

## 2024-06-06 RX ADMIN — Medication 300 MILLIGRAM(S): at 12:07

## 2024-06-06 RX ADMIN — SODIUM CHLORIDE 100 MILLILITER(S): 9 INJECTION, SOLUTION INTRAVENOUS at 16:57

## 2024-06-06 RX ADMIN — Medication 1334 MILLIGRAM(S): at 17:03

## 2024-06-06 RX ADMIN — DASATINIB 100 MILLIGRAM(S): 80 TABLET ORAL at 12:07

## 2024-06-06 NOTE — PROGRESS NOTE ADULT - PROBLEM SELECTOR PLAN 2
Not neutropenic, afebrile  If febrile. pan culture q 48 hrs  Start primary prophylaxis if patient becomes neutropenic   6/2 BCx NGTD

## 2024-06-06 NOTE — PROGRESS NOTE ADULT - NS ATTEND AMEND GEN_ALL_CORE FT
29M with PMH of spontaneous pneumothorax, who initially presented to [place] ED with dizziness and vertigo. Labs notable for profound leukocytosis (WBC > 600) and anemia.    Heme: CBC shows , Hgb 7.3, plt 202. WBC differential shows left-shift: 15% bands, 10% metamyelocytes, 36% myelocytes, 4% promyelocytes. No blasts reported. Suspicious for CML but will need BCR-ABL and BMBx to confirm diagnosis.  - Trend CBC with differential twice daily. Continue supportive transfusions as needed to maintain Hgb > 7 and plt > 10  (> 15 if febrile, > 50 if bleeding).  - Trend TLS labs (CMP, Phos, uric acid, LDH) twice daily. Start allopurinol. Consider rasburicase if uric acid > 8.   - peripheral blood flow w/ 1.5% blasts and BCR-ABL FISH positive for atypical BCR::ABL1 fusion pattern detected (98.5%)  - BMBx done 6/3 -dry tap  - US spleen showing spleen size 24.9cm  - Started Sprycel on 6/4/24  - Cont hydroxyurea 2 g BID for cytoreduction  - Follow up HIV and hepatitis serologies  - CTH negative. MRI brain also unremarkable 29M with PMH of spontaneous pneumothorax, who initially presented to [place] ED with dizziness and vertigo. Labs notable for profound leukocytosis (WBC > 600) and anemia.    Heme: CBC shows , Hgb 7.3, plt 202. WBC differential shows left-shift: 15% bands, 10% metamyelocytes, 36% myelocytes, 4% promyelocytes. No blasts reported. Suspicious for CML but will need BCR-ABL and BMBx to confirm diagnosis.  - Trend CBC with differential twice daily. Continue supportive transfusions as needed to maintain Hgb > 7 and plt > 10  (> 15 if febrile, > 50 if bleeding).  - Trend TLS labs (CMP, Phos, uric acid, LDH) twice daily. Start allopurinol. Consider rasburicase if uric acid > 8.   - peripheral blood flow w/ 1.5% blasts and BCR-ABL FISH positive for atypical BCR::ABL1 fusion pattern detected (98.5%)  - BMBx done 6/3 -chronic phase CML  - US spleen showing spleen size 24.9cm  - Started Sprycel on 6/4/24. Awaiting approval from [employer] (may take up to 7 days). In the meantime will try to see if able to get initial month supply from [employer]/  - Cont hydroxyurea 2 g BID for cytoreduction  - Follow up HIV and hepatitis serologies  - CTH negative. MRI brain also unremarkable

## 2024-06-06 NOTE — PROGRESS NOTE ADULT - PROBLEM SELECTOR PLAN 1
Transferred form    Monitor tumor lysis labs BID, CBC w/ dif, transfuse blood products and replete electrolytes PRN.  Strict Is/Os, daily weights, diuresis PRN, IVF.  Hepatitis w/u neg, HIV w/u neg, 6/2 G6PD pending   On allopurinol. Consider rasburicase if uric acid > 8.   6/2 - PB flow cytometry: myeloid immunophenotypic findings show an increase in CD34/ positive myeloblasts,  1.5% of cells, with normal immunophenotype, rare monocytes, and myeloid antigen maturation pattern with marked myeloid left shift and presence of 2% basophils. PB FLT3 (-).  6/2 - PB ABNORMAL FISH - Atypical BCR::ABL1 fusion pattern detected (98.5%)  6/3 - BMBx, follow up results.    6/5- Phoslo for hyperphosphatemia  6/4 - leukocytosis improving on hydrea continue for now.   6/4 STARTED SPRYCEL 100mg (not on any PPIs), patient provided information on Sprycel and educated on CML.   6/4 Abd US for evaluation of spleen size: spleen measures 24.9 cm.  Symptomatic anemia-replace prbc. Transferred form    Monitor tumor lysis labs BID, CBC w/ dif, transfuse blood products and replete electrolytes PRN.  Strict Is/Os, daily weights, diuresis PRN, IVF.  Hepatitis w/u neg, HIV w/u neg, 6/2 G6PD pending   On allopurinol. Consider rasburicase if uric acid > 8.   6/2 - PB flow cytometry: myeloid immunophenotypic findings show an increase in CD34/ positive myeloblasts,  1.5% of cells, with normal immunophenotype, rare monocytes, and myeloid antigen maturation pattern with marked myeloid left shift and presence of 2% basophils. PB FLT3 (-).  6/2 - PB ABNORMAL FISH - Atypical BCR::ABL1 fusion pattern detected (98.5%)  6/3 - BMBx, follow up results.    6/4 - leukocytosis improving on hydrea continue for now.   6/4 STARTED SPRYCEL 100mg (not on any PPIs), patient provided information on Sprycel and educated on CML.   6/4 Abd US for evaluation of spleen size: spleen measures 24.9 cm.  Monitor tenderness at bmbx site.   Patient enrolling in one month of free drug program.

## 2024-06-06 NOTE — PHARMACOTHERAPY INTERVENTION NOTE - COMMENTS
Clinical Pharmacy Specialist- Hematology/Oncology- Progress Note    Pt is a 30 y/o male with no significant PMH, presenting with dizziness, vertigo, weight loss, and subsequently found to have profound leukocytosis (WBC > 600) and anemia, admitted treatment of newly diagnosed CML     Antimicrobial Course:  -None needed currently    Last Neutropenic (ANC<1000): no occurrence  Last Febrile:  no occurrence  Days no longer Neutropenic: 4  Days afebrile: 4    Chemotherapy Course  -Regimen: Dasatinib 100mg PO daily  -Day: 3 (6/6)  BmBx: 6/3  Access: peripheral    Relevant clinical information used in assessment:  - EF = 67% (6/2)  - BMBx 6/3 results pending    Assessment/Plan/Recommendation:  - on hydrea 2gm q12hr   - Will  and educate on new medications at discharge  -Discharge Planning:  --> Meds sent for auth: allopurinol, zofran, fluconazole, levaquin, valtrex, dasatinib- sent to [facility], delivery next week so will obtain 30-day free trial from  ([facility]), pt aware to call & register    Additional Monitoring Needed?   -Yes- Continue to monitor renal function & daily counts for abx escalation/de-escalation   -Discharge Planning:  --> New meds:  allopurinol, zofran, fluconazole, levaquin, valtrex, dasatinib-  --> Meds sent for auth: allopurinol, zofran, fluconazole, levaquin, valtrex dasatinib- sent to [facility], delivery next week, sprycel cash pay x 7 days?  --> Delivered meds    Case discussed with attending/primary team    Cristine Brizuela, PharmD  PGY-1 Pharmacy Resident  Spectra [phone] or Teams     Priti Maloney, PharmD, BCPS  Clinical Pharmacy Specialist | Hematology/Oncology  [facility]  Email: [email or web address] or available on [employer]

## 2024-06-06 NOTE — PROGRESS NOTE ADULT - SUBJECTIVE AND OBJECTIVE BOX
Diagnosis: CML - BCR:ABL (+)    Protocol/Chemo Regimen: Dasatinib 100mg daily started 6/4    Pt endorsed:  +vertigo resolved, but occasional lightheadedness   mild short of breath today    Review of Systems:   Denies any nausea, vomiting, diarrhea, chest pain, abdominal pain.    Pain scale: Denies    Diet: Regular    Allergies    No Known Allergies    Intolerances      HEME/ONC MEDICATIONS  dasatinib 100 milliGRAM(s) Oral daily  hydroxyurea 2000 milliGRAM(s) Oral every 12 hours      STANDING MEDICATIONS  allopurinol 300 milliGRAM(s) Oral daily  calcium acetate 1334 milliGRAM(s) Oral three times a day with meals  lactated ringers. 1000 milliLiter(s) IV Continuous <Continuous>      PRN MEDICATIONS  acetaminophen     Tablet .. 650 milliGRAM(s) Oral every 6 hours PRN  aluminum hydroxide/magnesium hydroxide/simethicone Suspension 30 milliLiter(s) Oral every 4 hours PRN  melatonin 3 milliGRAM(s) Oral at bedtime PRN  ondansetron Injectable 4 milliGRAM(s) IV Push every 8 hours PRN  sodium chloride 0.65% Nasal 1 Spray(s) Both Nostrils two times a day PRN        Vital Signs Last 24 Hrs  T(C): 36.8 (06 Jun 2024 06:00), Max: 37.1 (05 Jun 2024 14:00)  T(F): 98.3 (06 Jun 2024 06:00), Max: 98.8 (05 Jun 2024 14:00)  HR: 83 (06 Jun 2024 06:00) (83 - 107)  BP: 112/85 (06 Jun 2024 06:00) (105/58 - 125/62)  BP(mean): --  RR: 18 (06 Jun 2024 06:00) (18 - 18)  SpO2: 97% (06 Jun 2024 06:00) (94% - 97%)    Parameters below as of 06 Jun 2024 06:00  Patient On (Oxygen Delivery Method): nasal cannula  O2 Flow (L/min): 2      PHYSICAL EXAM  General: NAD, resting in bed.  HEENT: Clear oropharynx, anicteric sclera, pink conjunctiva  CV: (+) S1/S2 RRR  Lungs: clear to auscultation, no wheezes   Abdomen: soft, non-tender, non-distended (+) BS  Ext: no edema  Skin: no rashes    Neuro: alert and oriented X 3, no focal deficits  Central Line: PIVL       Diagnosis: CML - BCR:ABL (+)    Protocol/Chemo Regimen: Dasatinib 100mg daily started 6/4    Pt endorsed:  +vertigo resolved, but occasional lightheadedness   mild short of breath today    Review of Systems:   Denies any nausea, vomiting, diarrhea, chest pain, abdominal pain.    Pain scale: Denies    Diet: Regular    Allergies    No Known Allergies    Intolerances    HEME/ONC MEDICATIONS  dasatinib 100 milliGRAM(s) Oral daily  hydroxyurea 2000 milliGRAM(s) Oral every 12 hours      STANDING MEDICATIONS  allopurinol 300 milliGRAM(s) Oral daily  calcium acetate 1334 milliGRAM(s) Oral three times a day with meals  lactated ringers. 1000 milliLiter(s) IV Continuous <Continuous>      PRN MEDICATIONS  acetaminophen     Tablet .. 650 milliGRAM(s) Oral every 6 hours PRN  aluminum hydroxide/magnesium hydroxide/simethicone Suspension 30 milliLiter(s) Oral every 4 hours PRN  melatonin 3 milliGRAM(s) Oral at bedtime PRN  ondansetron Injectable 4 milliGRAM(s) IV Push every 8 hours PRN  sodium chloride 0.65% Nasal 1 Spray(s) Both Nostrils two times a day PRN        Vital Signs Last 24 Hrs  T(C): 36.8 (06 Jun 2024 06:00), Max: 37.1 (05 Jun 2024 14:00)  T(F): 98.3 (06 Jun 2024 06:00), Max: 98.8 (05 Jun 2024 14:00)  HR: 83 (06 Jun 2024 06:00) (83 - 107)  BP: 112/85 (06 Jun 2024 06:00) (105/58 - 125/62)  BP(mean): --  RR: 18 (06 Jun 2024 06:00) (18 - 18)  SpO2: 97% (06 Jun 2024 06:00) (94% - 97%)    Parameters below as of 06 Jun 2024 06:00  Patient On (Oxygen Delivery Method): nasal cannula  O2 Flow (L/min): 2      PHYSICAL EXAM  General: NAD, resting in bed.  HEENT: Clear oropharynx, anicteric sclera, pink conjunctiva  CV: (+) S1/S2 RRR  Lungs: clear to auscultation, no wheezes   Abdomen: soft, non-tender, non-distended (+) BS  Ext: no edema  Skin: no rashes    Neuro: alert and oriented X 3, no focal deficits  Central Line: PIVL    LABS:    Blood Cultures:                           9.0    399.19 )-----------( 187      ( 06 Jun 2024 06:54 )             26.3         Mean Cell Volume : 96.0 fl  Mean Cell Hemoglobin : 32.8 pg  Mean Cell Hemoglobin Concentration : 34.2 gm/dL  Auto Neutrophil # : 179.64 K/uL  Auto Lymphocyte # : 3.99 K/uL  Auto Monocyte # : 3.99 K/uL  Auto Eosinophil # : 51.89 K/uL  Auto Basophil # : 0.00 K/uL  Auto Neutrophil % : 43.0 %  Auto Lymphocyte % : 1.0 %  Auto Monocyte % : 1.0 %  Auto Eosinophil % : 13.0 %  Auto Basophil % : 0.0 %      06-06    140  |  101  |  24<H>  ----------------------------<  102<H>  3.7   |  21<L>  |  0.89    Ca    9.8      06 Jun 2024 06:52  Phos  4.9     06-06  Mg     2.4     06-06    TPro  7.7  /  Alb  4.5  /  TBili  0.9  /  DBili  x   /  AST  59<H>  /  ALT  49<H>  /  AlkPhos  151<H>  06-06  PT/INR - ( 06 Jun 2024 06:54 )   PT: 14.9 sec;   INR: 1.37 ratio

## 2024-06-06 NOTE — PROGRESS NOTE ADULT - ASSESSMENT
29M with PMH of spontaneous pneumothorax, who presented to [place] ED with dizziness and vertigo, found to have for profound leukocytosis (WBC > 600) and anemia. Patient transferred to [address] [place] [address] for further workup and management, working to r/o CML. BMBx on 6/3 pending, PB FISH (+) BCR:ABL fusion. Course complicated by non neutropenic fever (culture neg), hypoxia on RA requiring supplemental O2 via NC. Patient with thrombocytopenia, anemia, and leukocytosis (being managed with Hydrea secondary to disease.

## 2024-06-07 ENCOUNTER — TRANSCRIPTION ENCOUNTER (OUTPATIENT)
Age: 29
End: 2024-06-07

## 2024-06-07 DIAGNOSIS — R74.01 ELEVATION OF LEVELS OF LIVER TRANSAMINASE LEVELS: ICD-10-CM

## 2024-06-07 LAB
ALBUMIN SERPL ELPH-MCNC: 4.3 G/DL — SIGNIFICANT CHANGE UP (ref 3.3–5)
ALBUMIN SERPL ELPH-MCNC: 4.9 G/DL — SIGNIFICANT CHANGE UP (ref 3.3–5)
ALP SERPL-CCNC: 147 U/L — HIGH (ref 40–120)
ALP SERPL-CCNC: 150 U/L — HIGH (ref 40–120)
ALT FLD-CCNC: 51 U/L — HIGH (ref 10–45)
ALT FLD-CCNC: 53 U/L — HIGH (ref 10–45)
ANION GAP SERPL CALC-SCNC: 14 MMOL/L — SIGNIFICANT CHANGE UP (ref 5–17)
ANION GAP SERPL CALC-SCNC: 14 MMOL/L — SIGNIFICANT CHANGE UP (ref 5–17)
AST SERPL-CCNC: 47 U/L — HIGH (ref 10–40)
AST SERPL-CCNC: 49 U/L — HIGH (ref 10–40)
BASOPHILS # BLD AUTO: 0 K/UL — SIGNIFICANT CHANGE UP (ref 0–0.2)
BASOPHILS NFR BLD AUTO: 0 % — SIGNIFICANT CHANGE UP (ref 0–2)
BILIRUB SERPL-MCNC: 0.6 MG/DL — SIGNIFICANT CHANGE UP (ref 0.2–1.2)
BILIRUB SERPL-MCNC: 0.7 MG/DL — SIGNIFICANT CHANGE UP (ref 0.2–1.2)
BLASTS # FLD: 2 % — HIGH (ref 0–0)
BLD GP AB SCN SERPL QL: NEGATIVE — SIGNIFICANT CHANGE UP
BUN SERPL-MCNC: 24 MG/DL — HIGH (ref 7–23)
BUN SERPL-MCNC: 24 MG/DL — HIGH (ref 7–23)
CALCIUM SERPL-MCNC: 10.2 MG/DL — SIGNIFICANT CHANGE UP (ref 8.4–10.5)
CALCIUM SERPL-MCNC: 9.8 MG/DL — SIGNIFICANT CHANGE UP (ref 8.4–10.5)
CHLORIDE SERPL-SCNC: 100 MMOL/L — SIGNIFICANT CHANGE UP (ref 96–108)
CHLORIDE SERPL-SCNC: 101 MMOL/L — SIGNIFICANT CHANGE UP (ref 96–108)
CO2 SERPL-SCNC: 24 MMOL/L — SIGNIFICANT CHANGE UP (ref 22–31)
CO2 SERPL-SCNC: 25 MMOL/L — SIGNIFICANT CHANGE UP (ref 22–31)
CREAT SERPL-MCNC: 0.8 MG/DL — SIGNIFICANT CHANGE UP (ref 0.5–1.3)
CREAT SERPL-MCNC: 0.93 MG/DL — SIGNIFICANT CHANGE UP (ref 0.5–1.3)
CULTURE RESULTS: SIGNIFICANT CHANGE UP
CULTURE RESULTS: SIGNIFICANT CHANGE UP
EGFR: 114 ML/MIN/1.73M2 — SIGNIFICANT CHANGE UP
EGFR: 123 ML/MIN/1.73M2 — SIGNIFICANT CHANGE UP
EOSINOPHIL # BLD AUTO: 15.25 K/UL — HIGH (ref 0–0.5)
EOSINOPHIL NFR BLD AUTO: 4 % — SIGNIFICANT CHANGE UP (ref 0–6)
GLUCOSE SERPL-MCNC: 112 MG/DL — HIGH (ref 70–99)
GLUCOSE SERPL-MCNC: 96 MG/DL — SIGNIFICANT CHANGE UP (ref 70–99)
HCT VFR BLD CALC: 27.1 % — LOW (ref 39–50)
HGB BLD-MCNC: 9.7 G/DL — LOW (ref 13–17)
LDH SERPL L TO P-CCNC: 1275 U/L — HIGH (ref 50–242)
LDH SERPL L TO P-CCNC: 1601 U/L — HIGH (ref 50–242)
LYMPHOCYTES # BLD AUTO: 2 % — LOW (ref 13–44)
LYMPHOCYTES # BLD AUTO: 7.63 K/UL — HIGH (ref 1–3.3)
MAGNESIUM SERPL-MCNC: 2.3 MG/DL — SIGNIFICANT CHANGE UP (ref 1.6–2.6)
MAGNESIUM SERPL-MCNC: 2.6 MG/DL — SIGNIFICANT CHANGE UP (ref 1.6–2.6)
MANUAL SMEAR VERIFICATION: SIGNIFICANT CHANGE UP
MCHC RBC-ENTMCNC: 31.6 PG — SIGNIFICANT CHANGE UP (ref 27–34)
MCHC RBC-ENTMCNC: 35.8 GM/DL — SIGNIFICANT CHANGE UP (ref 32–36)
MCV RBC AUTO: 88.3 FL — SIGNIFICANT CHANGE UP (ref 80–100)
METAMYELOCYTES # FLD: 5 % — HIGH (ref 0–0)
MONOCYTES # BLD AUTO: 3.81 K/UL — HIGH (ref 0–0.9)
MONOCYTES NFR BLD AUTO: 1 % — LOW (ref 2–14)
MYELOCYTES NFR BLD: 25 % — HIGH (ref 0–0)
NEUTROPHILS # BLD AUTO: 217.32 K/UL — HIGH (ref 1.8–7.4)
NEUTROPHILS NFR BLD AUTO: 47 % — SIGNIFICANT CHANGE UP (ref 43–77)
NEUTS BAND # BLD: 10 % — HIGH (ref 0–8)
NRBC # BLD: 1 /100 WBCS — HIGH (ref 0–0)
PHOSPHATE SERPL-MCNC: 4.6 MG/DL — HIGH (ref 2.5–4.5)
PHOSPHATE SERPL-MCNC: 4.8 MG/DL — HIGH (ref 2.5–4.5)
PLAT MORPH BLD: NORMAL — SIGNIFICANT CHANGE UP
PLATELET # BLD AUTO: 203 K/UL — SIGNIFICANT CHANGE UP (ref 150–400)
POTASSIUM SERPL-MCNC: 3.6 MMOL/L — SIGNIFICANT CHANGE UP (ref 3.5–5.3)
POTASSIUM SERPL-MCNC: 3.8 MMOL/L — SIGNIFICANT CHANGE UP (ref 3.5–5.3)
POTASSIUM SERPL-SCNC: 3.6 MMOL/L — SIGNIFICANT CHANGE UP (ref 3.5–5.3)
POTASSIUM SERPL-SCNC: 3.8 MMOL/L — SIGNIFICANT CHANGE UP (ref 3.5–5.3)
PROMYELOCYTES # FLD: 4 % — HIGH (ref 0–0)
PROT SERPL-MCNC: 7.4 G/DL — SIGNIFICANT CHANGE UP (ref 6–8.3)
PROT SERPL-MCNC: 7.8 G/DL — SIGNIFICANT CHANGE UP (ref 6–8.3)
RBC # BLD: 3.07 M/UL — LOW (ref 4.2–5.8)
RBC # FLD: 18.6 % — HIGH (ref 10.3–14.5)
RBC BLD AUTO: SIGNIFICANT CHANGE UP
RH IG SCN BLD-IMP: POSITIVE — SIGNIFICANT CHANGE UP
SODIUM SERPL-SCNC: 139 MMOL/L — SIGNIFICANT CHANGE UP (ref 135–145)
SODIUM SERPL-SCNC: 139 MMOL/L — SIGNIFICANT CHANGE UP (ref 135–145)
SPECIMEN SOURCE: SIGNIFICANT CHANGE UP
SPECIMEN SOURCE: SIGNIFICANT CHANGE UP
URATE SERPL-MCNC: 4.1 MG/DL — SIGNIFICANT CHANGE UP (ref 3.4–8.8)
URATE SERPL-MCNC: 4.9 MG/DL — SIGNIFICANT CHANGE UP (ref 3.4–8.8)
WBC # BLD: 381.27 K/UL — CRITICAL HIGH (ref 3.8–10.5)
WBC # FLD AUTO: 381.27 K/UL — CRITICAL HIGH (ref 3.8–10.5)

## 2024-06-07 PROCEDURE — 99232 SBSQ HOSP IP/OBS MODERATE 35: CPT

## 2024-06-07 RX ORDER — ALLOPURINOL 300 MG
1 TABLET ORAL
Qty: 10 | Refills: 0
Start: 2024-06-07 | End: 2024-06-16

## 2024-06-07 RX ORDER — HYDROXYUREA 500 MG/1
2 CAPSULE ORAL
Qty: 120 | Refills: 0
Start: 2024-06-07 | End: 2024-07-06

## 2024-06-07 RX ADMIN — Medication 1334 MILLIGRAM(S): at 08:43

## 2024-06-07 RX ADMIN — SODIUM CHLORIDE 100 MILLILITER(S): 9 INJECTION, SOLUTION INTRAVENOUS at 13:26

## 2024-06-07 RX ADMIN — DASATINIB 100 MILLIGRAM(S): 80 TABLET ORAL at 11:24

## 2024-06-07 RX ADMIN — Medication 300 MILLIGRAM(S): at 11:24

## 2024-06-07 RX ADMIN — HYDROXYUREA 2000 MILLIGRAM(S): 500 CAPSULE ORAL at 21:46

## 2024-06-07 RX ADMIN — HYDROXYUREA 2000 MILLIGRAM(S): 500 CAPSULE ORAL at 10:37

## 2024-06-07 NOTE — PROGRESS NOTE ADULT - SUBJECTIVE AND OBJECTIVE BOX
Diagnosis: CML - BCR:ABL (+)    Protocol/Chemo Regimen: Dasatinib 100mg daily started 6/4    Day: 4    Pt endorsed: +left ear tinnitus persists    Review of Systems:  Denies any nausea, vomiting, diarrhea, chest pain, abdominal pain.    Pain scale: Denies    Diet: Regular    Allergies: No Known Allergies      ANTIMICROBIALS      HEME/ONC MEDICATIONS  dasatinib 100 milliGRAM(s) Oral daily  hydroxyurea 2000 milliGRAM(s) Oral every 12 hours      STANDING MEDICATIONS  allopurinol 300 milliGRAM(s) Oral daily  lactated ringers. 1000 milliLiter(s) IV Continuous <Continuous>      PRN MEDICATIONS  acetaminophen     Tablet .. 650 milliGRAM(s) Oral every 6 hours PRN  aluminum hydroxide/magnesium hydroxide/simethicone Suspension 30 milliLiter(s) Oral every 4 hours PRN  melatonin 3 milliGRAM(s) Oral at bedtime PRN  ondansetron Injectable 4 milliGRAM(s) IV Push every 8 hours PRN  sodium chloride 0.65% Nasal 1 Spray(s) Both Nostrils two times a day PRN        Vital Signs Last 24 Hrs  T(C): 36.6 (07 Jun 2024 09:30), Max: 37 (06 Jun 2024 13:31)  T(F): 97.9 (07 Jun 2024 09:30), Max: 98.6 (06 Jun 2024 13:31)  HR: 88 (07 Jun 2024 09:30) (79 - 97)  BP: 115/67 (07 Jun 2024 09:30) (107/72 - 127/66)  BP(mean): --  RR: 18 (07 Jun 2024 09:30) (18 - 18)  SpO2: 97% (07 Jun 2024 09:30) (93% - 97%)    Parameters below as of 07 Jun 2024 09:30  Patient On (Oxygen Delivery Method): room air        PHYSICAL EXAM  General: NAD, resting in bed.  HEENT: Clear oropharynx, anicteric sclera, pink conjunctiva  CV: (+) S1/S2 RRR  Lungs: clear to auscultation, no wheezes   Abdomen: soft, non-tender, non-distended (+) BS  Ext: no edema  Skin: no rashes    Neuro: alert and oriented X 3, no focal deficits  Central Line: PIVL          LABS:             9.7    381.27 )-----------( 203      ( 07 Jun 2024 06:57 )             27.1     Mean Cell Volume : 88.3 fl  Mean Cell Hemoglobin : 31.6 pg  Mean Cell Hemoglobin Concentration : 35.8 gm/dL  Auto Neutrophil # : 217.32 K/uL  Auto Lymphocyte # : 7.63 K/uL  Auto Monocyte # : 3.81 K/uL  Auto Eosinophil # : 15.25 K/uL  Auto Basophil # : 0.00 K/uL  Auto Neutrophil % : 47.0 %  Auto Lymphocyte % : 2.0 %  Auto Monocyte % : 1.0 %  Auto Eosinophil % : 4.0 %  Auto Basophil % : 0.0 %    06-07    139  |  100  |  24<H>  ----------------------------<  96  3.8   |  25  |  0.80    Ca    10.2      07 Jun 2024 06:49  Phos  4.8     06-07  Mg     2.6     06-07    TPro  7.8  /  Alb  4.9  /  TBili  0.7  /  DBili  x   /  AST  49<H>  /  ALT  51<H>  /  AlkPhos  150<H>  06-07    Mg 2.6  Phos 4.8    PT/INR - ( 06 Jun 2024 06:54 )   PT: 14.9 sec;   INR: 1.37 ratio      LDH 1601  Uric Acid 4.9        RECENT CULTURES:  Culture - Blood (06.02.24 @ 04:50)    Specimen Source: .Blood Blood-Peripheral   Culture Results:   No growth at 24 hours    Culture - Blood (06.02.24 @ 04:25)    Specimen Source: .Blood Blood-Peripheral   Culture Results:   No growth at 24 hours    Culture - Urine (06.02.24 @ 02:45)    Specimen Source: Clean Catch Clean Catch (Midstream)   Culture Results:   <10,000 CFU/mL Normal Urogenital Minerva        RADIOLOGY & ADDITIONAL STUDIES:  Hematopathology Report (06.03.24 @ 11:50)  Specimen(s) Submitted  1  Left pic bone marrow biopsy  Final Diagnosis  1, 2. Bone Marrow Biopsy, Clot and Bone Marrow Aspirate, Left PIC  - Chronic Myeloid Leukemia, Chronic Phase, BCR/ABL1 positive.  - FISH studies detected atypical BCR::ABL1 fusion pattern  (98.5%).  - Quantitative BCR-ABL by real time RT-PCR positive  for p210(BCR-ABL1: >10.000 %  on the International Scale) and p190  (%BCR-ABL/ABL= 0.031 %).  See note and description.    Flow cytometry, Blood:  Date Collected: 06/02/2024  Specimen Type: Peripheral Blood smear ([id number])  Test Requested: FISH Analysis  FISH Result: ABNORMAL FISH - Atypical BCR::ABL1 fusion pattern detected  (98.5%)  BCR Final Report  Accession Number: [id number]  Specimen: .Blood  Test Performed: Quantitative BCR-ABL by real time RT-PCR  RESULTS:  p210: Positive  BCR-ABL1: >10.000 %  on the International Scale.  p190: Positive  %BCR-ABL/ABL= 0.031 %  RESULTS:  FLT3-ITD mutation: NEGATIVE  FLT3-TKD mutation: NEGATIVE    US Spleen (06.04.24 @ 15:51)   IMPRESSION:  The spleen measures 24.9 cm. On CT 10/23/2019, the spleen measures   approximately 12 cm.    Xray Chest 1 View- PORTABLE-Urgent (Xray Chest 1 View- PORTABLE-Urgent .) (06.02.24 @ 06:25)   Clear lungs. Findings unchanged    Peripheral Blood Flow Cytometry (06.02.24)  Peripheral blood:       - The lymphocyte immunophenotypic findings show no diagnostic abnormalities with lymphopenia.       - The myeloid immunophenotypic findings show an increase in CD34/ positive myeloblasts,  1.5% of cells, with normal immunophenotype, rare monocytes, and myeloid antigen maturation pattern  with marked myeloid left shift and presence of 2% basophils.       -Correlation with cytogenetics, FISH, and molecular studies is necessary.  Please see interpretation.  MORPHOLOGY: Lymphopenia with marked leukocytosis and myeloid left shift, few blasts, no monocytes,  increased eosinophils     Diagnosis: CML - BCR:ABL (+)    Protocol/Chemo Regimen: Dasatinib 100mg daily started 6/4    Day: 4    Pt endorsed: +left ear tinnitus persists, +occasional lightheadedness with blood draws/when he takes sprycel (stays in bed, resolves with food and rest)    Review of Systems:  Denies any nausea, vomiting, diarrhea, chest pain, abdominal pain.    Pain scale: Denies    Diet: Regular    Allergies: No Known Allergies      ANTIMICROBIALS      HEME/ONC MEDICATIONS  dasatinib 100 milliGRAM(s) Oral daily  hydroxyurea 2000 milliGRAM(s) Oral every 12 hours      STANDING MEDICATIONS  allopurinol 300 milliGRAM(s) Oral daily  lactated ringers. 1000 milliLiter(s) IV Continuous <Continuous>      PRN MEDICATIONS  acetaminophen     Tablet .. 650 milliGRAM(s) Oral every 6 hours PRN  aluminum hydroxide/magnesium hydroxide/simethicone Suspension 30 milliLiter(s) Oral every 4 hours PRN  melatonin 3 milliGRAM(s) Oral at bedtime PRN  ondansetron Injectable 4 milliGRAM(s) IV Push every 8 hours PRN  sodium chloride 0.65% Nasal 1 Spray(s) Both Nostrils two times a day PRN        Vital Signs Last 24 Hrs  T(C): 36.6 (07 Jun 2024 09:30), Max: 37 (06 Jun 2024 13:31)  T(F): 97.9 (07 Jun 2024 09:30), Max: 98.6 (06 Jun 2024 13:31)  HR: 88 (07 Jun 2024 09:30) (79 - 97)  BP: 115/67 (07 Jun 2024 09:30) (107/72 - 127/66)  BP(mean): --  RR: 18 (07 Jun 2024 09:30) (18 - 18)  SpO2: 97% (07 Jun 2024 09:30) (93% - 97%)    Parameters below as of 07 Jun 2024 09:30  Patient On (Oxygen Delivery Method): room air        PHYSICAL EXAM  General: NAD, resting in bed.  HEENT: Clear oropharynx, anicteric sclera, pink conjunctiva  CV: (+) S1/S2 RRR  Lungs: clear to auscultation, no wheezes   Abdomen: soft, non-tender, non-distended (+) BS  Ext: no edema  Skin: no rashes    Neuro: alert and oriented X 3, no focal deficits  Central Line: PIVL          LABS:             9.7    381.27 )-----------( 203      ( 07 Jun 2024 06:57 )             27.1     Mean Cell Volume : 88.3 fl  Mean Cell Hemoglobin : 31.6 pg  Mean Cell Hemoglobin Concentration : 35.8 gm/dL  Auto Neutrophil # : 217.32 K/uL  Auto Lymphocyte # : 7.63 K/uL  Auto Monocyte # : 3.81 K/uL  Auto Eosinophil # : 15.25 K/uL  Auto Basophil # : 0.00 K/uL  Auto Neutrophil % : 47.0 %  Auto Lymphocyte % : 2.0 %  Auto Monocyte % : 1.0 %  Auto Eosinophil % : 4.0 %  Auto Basophil % : 0.0 %    06-07    139  |  100  |  24<H>  ----------------------------<  96  3.8   |  25  |  0.80    Ca    10.2      07 Jun 2024 06:49  Phos  4.8     06-07  Mg     2.6     06-07    TPro  7.8  /  Alb  4.9  /  TBili  0.7  /  DBili  x   /  AST  49<H>  /  ALT  51<H>  /  AlkPhos  150<H>  06-07    Mg 2.6  Phos 4.8    PT/INR - ( 06 Jun 2024 06:54 )   PT: 14.9 sec;   INR: 1.37 ratio      LDH 1601  Uric Acid 4.9        RECENT CULTURES:  Culture - Blood (06.02.24 @ 04:50)    Specimen Source: .Blood Blood-Peripheral   Culture Results:   No growth at 24 hours    Culture - Blood (06.02.24 @ 04:25)    Specimen Source: .Blood Blood-Peripheral   Culture Results:   No growth at 24 hours    Culture - Urine (06.02.24 @ 02:45)    Specimen Source: Clean Catch Clean Catch (Midstream)   Culture Results:   <10,000 CFU/mL Normal Urogenital Minerva        RADIOLOGY & ADDITIONAL STUDIES:  Hematopathology Report (06.03.24 @ 11:50)  Specimen(s) Submitted  1  Left pic bone marrow biopsy  Final Diagnosis  1, 2. Bone Marrow Biopsy, Clot and Bone Marrow Aspirate, Left PIC  - Chronic Myeloid Leukemia, Chronic Phase, BCR/ABL1 positive.  - FISH studies detected atypical BCR::ABL1 fusion pattern  (98.5%).  - Quantitative BCR-ABL by real time RT-PCR positive  for p210(BCR-ABL1: >10.000 %  on the International Scale) and p190  (%BCR-ABL/ABL= 0.031 %).  See note and description.    Flow cytometry, Blood:  Date Collected: 06/02/2024  Specimen Type: Peripheral Blood smear ([id number])  Test Requested: FISH Analysis  FISH Result: ABNORMAL FISH - Atypical BCR::ABL1 fusion pattern detected  (98.5%)  BCR Final Report  Accession Number: [id number]  Specimen: .Blood  Test Performed: Quantitative BCR-ABL by real time RT-PCR  RESULTS:  p210: Positive  BCR-ABL1: >10.000 %  on the International Scale.  p190: Positive  %BCR-ABL/ABL= 0.031 %  RESULTS:  FLT3-ITD mutation: NEGATIVE  FLT3-TKD mutation: NEGATIVE    US Spleen (06.04.24 @ 15:51)   IMPRESSION:  The spleen measures 24.9 cm. On CT 10/23/2019, the spleen measures   approximately 12 cm.    Xray Chest 1 View- PORTABLE-Urgent (Xray Chest 1 View- PORTABLE-Urgent .) (06.02.24 @ 06:25)   Clear lungs. Findings unchanged    Peripheral Blood Flow Cytometry (06.02.24)  Peripheral blood:       - The lymphocyte immunophenotypic findings show no diagnostic abnormalities with lymphopenia.       - The myeloid immunophenotypic findings show an increase in CD34/ positive myeloblasts,  1.5% of cells, with normal immunophenotype, rare monocytes, and myeloid antigen maturation pattern  with marked myeloid left shift and presence of 2% basophils.       -Correlation with cytogenetics, FISH, and molecular studies is necessary.  Please see interpretation.  MORPHOLOGY: Lymphopenia with marked leukocytosis and myeloid left shift, few blasts, no monocytes,  increased eosinophils

## 2024-06-07 NOTE — DISCHARGE NOTE NURSING/CASE MANAGEMENT/SOCIAL WORK - PATIENT PORTAL LINK FT
You can access the FollowMyHealth Patient Portal offered by [facility] by registering at the following website: [email or web address]. By joining [facility]’s FollowMyHealth portal, you will also be able to view your health information using other applications (apps) compatible with our system.

## 2024-06-07 NOTE — PROVIDER CONTACT NOTE (CRITICAL VALUE NOTIFICATION) - ACTION/TREATMENT ORDERED:
No new orders at this time.
No new orders at this time.
No new orders at this time. Nursing care ongoing.
pending plan of care
Continue to monitor
Continue to monitor, continue with 2g of Hydrea
FS stat
Provider notified and aware. No new orders at this time. Nursing care ongoing

## 2024-06-07 NOTE — DISCHARGE NOTE NURSING/CASE MANAGEMENT/SOCIAL WORK - NSDCFUADDAPPT_GEN_ALL_CORE_FT
You have a lab appointment at the [facility] on Tuesday, 6/11, at 12:00PM (noon).  You have a follow up appointment at the [facility] on Wednesday, 6/19, at 11:00AM with Dr. Mendiola. Please bring all of your medications with you, and please arrive 30 min early (10:30AM) for labs to be drawn prior to your appointment.

## 2024-06-07 NOTE — PROVIDER CONTACT NOTE (CRITICAL VALUE NOTIFICATION) - NAME OF MD/NP/PA/DO NOTIFIED:
Dallin Hebert, PA
DEEP Zamora
Elise Helms NP
Ivette ADAME
PA -[place]
Lili Camacho NP
Lili Camacho NP
Ivette ADAME

## 2024-06-07 NOTE — PROGRESS NOTE ADULT - NS ATTEND AMEND GEN_ALL_CORE FT
29M with PMH of spontaneous pneumothorax, who initially presented to [place] ED with dizziness and vertigo. Labs notable for profound leukocytosis (WBC > 600) and anemia.    Heme: CBC shows , Hgb 7.3, plt 202. WBC differential shows left-shift: 15% bands, 10% metamyelocytes, 36% myelocytes, 4% promyelocytes. No blasts reported. Suspicious for CML but will need BCR-ABL and BMBx to confirm diagnosis.  - Trend CBC with differential twice daily. Continue supportive transfusions as needed to maintain Hgb > 7 and plt > 10  (> 15 if febrile, > 50 if bleeding).  - Trend TLS labs (CMP, Phos, uric acid, LDH) twice daily. Start allopurinol. Consider rasburicase if uric acid > 8.   - peripheral blood flow w/ 1.5% blasts and BCR-ABL FISH positive for atypical BCR::ABL1 fusion pattern detected (98.5%)  - BMBx done 6/3 -chronic phase CML  - US spleen showing spleen size 24.9cm  - Started Sprycel on 6/4/24. Awaiting approval from [employer] (may take up to 7 days). In the meantime will try to see if able to get initial month supply from [employer]/  - Cont hydroxyurea 2 g BID for cytoreduction  - Follow up HIV and hepatitis serologies  - CTH negative. MRI brain also unremarkable 29M with PMH of spontaneous pneumothorax, who initially presented to [place] ED with dizziness and vertigo. Labs notable for profound leukocytosis (WBC > 600) and anemia. + BCR-ABL    Heme:   - Trend CBC with differential and TL labs once a day now, not needed transfusion support  - Started allopurinol. Consider rasburicase if uric acid > 8.   - peripheral blood flow w/ 1.5% blasts and BCR-ABL FISH positive for atypical BCR::ABL1 fusion pattern detected (98.5%). BMBx done 6/3 -chronic phase CML  - US spleen showing spleen size 24.9cm  - Started Sprycel on 6/4/24. Free 1 month supply provided  - Cont hydroxyurea 2 g BID for cytoreduction, decrease to 1 g BID upon discharge  - CTH negative. MRI brain also unremarkable    Dispo:  - Weakness and unsteadiness on feet, will request Pt eval and reassess over weekend for discharge  - Lab appt on tuesday 6/11/24  - Dr Mendiola appt on 6/19/24

## 2024-06-07 NOTE — PHARMACOTHERAPY INTERVENTION NOTE - COMMENTS
Clinical Pharmacy Specialist- Hematology/Oncology- Progress Note    Pt is a 30 y/o male with no significant PMH, presenting with dizziness, vertigo, weight loss, and subsequently found to have profound leukocytosis (WBC > 600) and anemia, admitted treatment of newly diagnosed CML     Antimicrobial Course:  -None needed currently    Last Neutropenic (ANC<1000): no occurrence  Last Febrile:  no occurrence  Days no longer Neutropenic: 5  Days afebrile: 5    Chemotherapy Course  -Regimen: Dasatinib 100mg PO daily  -Day: 4 (6/7)  BmBx: 6/3  Access: peripheral    Relevant clinical information used in assessment:  - EF = 67% (6/2)  - BMBx 6/3: significant for CML findings    Assessment/Plan/Recommendation:  - on hydrea 2gm q12hr   - Will  and educate on new medications at discharge  -Discharge Planning:  --> Meds sent for auth: allopurinol, zofran, fluconazole, levaquin, valtrex, dasatinib- sent to [facility], delivery next week so [facility]day [facility] pt aware to call & register    Additional Monitoring Needed?   -Yes- Continue to monitor renal function & daily counts for abx escalation/de-escalation   -Discharge Planning:  --> New meds:  allopurinol, zofran, fluconazole, levaquin, valtrex, dasatinib-  --> Meds sent for auth: allopurinol, zofran, fluconazole, levaquin, valtrex dasatinib- sent to [facility], delivery next week, sprycel cash pay x 7 days?  --> Delivered meds    Case discussed with attending/primary team    Cristine Brizuela, PharmD  PGY-1 Pharmacy Resident  Spectra [phone] or Teams     Priti Maloney, PharmD, BCPS  Clinical Pharmacy Specialist | Hematology/Oncology  [facility]  Email: [email or web address] or available on [employer]    Clinical Pharmacy Specialist- Hematology/Oncology- Progress Note    Pt is a 30 y/o male with no significant PMH, presenting with dizziness, vertigo, weight loss, and subsequently found to have profound leukocytosis (WBC > 600) and anemia, admitted treatment of newly diagnosed CML     Antimicrobial Course:  -None needed currently    Last Neutropenic (ANC<1000): no occurrence  Last Febrile:  no occurrence  Days no longer Neutropenic: 5  Days afebrile: 5    Chemotherapy Course  -Regimen: Dasatinib 100mg PO daily  -Day: 4 (6/7)  BmBx: 6/3  Access: peripheral    Relevant clinical information used in assessment:  - EF = 67% (6/2)  - BMBx 6/3: significant for CML findings    Assessment/Plan/Recommendation:  - on hydrea 2gm q12hr   - Will  and educate on new medications at discharge  -Discharge Planning:  --> Meds sent for auth: allopurinol, zofran, fluconazole, levaquin, valtrex, dasatinib- [facility] to [facility], delivery next week so [facility] reminded pt to call & register again today & provid eus w/ BIN/PCN, etc #'s    Additional Monitoring Needed?   -Yes- Continue to monitor renal function & daily counts for abx escalation/de-escalation   -Discharge Planning:  --> New meds:  allopurinol, zofran, fluconazole, levaquin, valtrex, dasatinib-  --> Meds sent for auth: allopurinol, zofran, fluconazole, levaquin, valtrex dasatinib- sent to [facility], delivery next week, sprycel cash pay x 7 days?  --> Delivered meds    Case discussed with attending/primary team    Cristine Brizuela, PharmD  PGY-1 Pharmacy Resident  Spectra [phone] or Teams     Priti Malonye, PharmD, BCPS  Clinical Pharmacy Specialist | Hematology/Oncology  [facility]  Email: [email or web address] or available on [employer]    Clinical Pharmacy Specialist- Hematology/Oncology- Progress Note    Pt is a 30 y/o male with no significant PMH, presenting with dizziness, vertigo, weight loss, and subsequently found to have profound leukocytosis (WBC > 600) and anemia, admitted treatment of newly diagnosed CML     Antimicrobial Course:  -None needed currently    Last Neutropenic (ANC<1000): no occurrence  Last Febrile:  no occurrence  Days no longer Neutropenic: 5  Days afebrile: 5    Chemotherapy Course  -Regimen: Dasatinib 100mg PO daily  -Day: 4 (6/7)  BmBx: 6/3  Access: peripheral    Relevant clinical information used in assessment:  - EF = 67% (6/2)  - BMBx 6/3: significant for CML findings  - 6/7- reminded pt to call & register again today & provid eus w/ BIN/PCN, etc #'s    Assessment/Plan/Recommendation:  - on hydrea 2gm q12hr- keeping    Counseled patient on discharge medication (plan for d/ tomorrow- allopurinol, zofran, fluconazole, levaquin, valtrex, dasatinib, hydroxyurea (delivered, in med cabinet)  - discussed that levaquin, valtrex, & fluconazole are not to be taken until instructed by provider (placed in separate bag with medication information)  - discussed allopurinol is only for 10 days, no refills needed  - discussed duration of hydroxyurea will be limited until instructed to stop (based on white count)  - discussed Sprycel will be coming from [employer] moving forward (1st fill today was from [facility] as a courtesy from [facility] free trial. Pt states they told him he would have another free trial coming to his email? but has not received yet. unsure what this trial is  Discussed indications, common side effects, and any special instructions. Patient expresses understanding of all indications, common side effects, and any special instructions. All medication related questions have been answered at this time.  Printed teaching materials provided and reviewed from  [employer]    -Discharge Planning:  --> Meds sent for auth: allopurinol, zofran, fluconazole, levaquin, valtrex, dasatinib- sent to [employer], delivery next week so will obtain 30-day free trial from     --> Delivered meds: allopurinol, zofran, fluconazole, levaquin, valtrex, dasatinib, hydroxyurea  Additional Monitoring Needed?   -Yes- Continue to monitor renal function & daily counts for abx escalation/de-escalation   -Discharge Planning:  --> New meds:  allopurinol, zofran, fluconazole, levaquin, valtrex, dasatinib-  --> Meds sent for auth: allopurinol, zofran, fluconazole, levaquin, valtrex dasatinib- sent to [facility], delivery next week, sprycel cash pay x 7 days?  --> Delivered meds    Case discussed with attending/primary team    Cristine Brizuela, PharmD  PGY-1 Pharmacy Resident  Spectra [phone] or Teams     Priti Maloney, PharmD, BCPS  Clinical Pharmacy Specialist | Hematology/Oncology  [facility]  Email: [email or web address] or available on [employer]

## 2024-06-07 NOTE — PROGRESS NOTE ADULT - ASSESSMENT
29M with PMH of spontaneous pneumothorax, who presented to [place] ED with dizziness and vertigo, found to have for profound leukocytosis (WBC > 600) and anemia. Patient transferred to [address] [place] [address] for further workup and management, working to r/o CML. BMBx on 6/3 pending, PB FISH (+) BCR:ABL fusion. Course complicated by non neutropenic fever (culture neg), hypoxia on RA requiring supplemental O2 via NC. Patient with thrombocytopenia, anemia, and leukocytosis (being managed with Hydrea secondary to disease.     29M with PMH of spontaneous pneumothorax, who presented to [place] ED with dizziness and vertigo, found to have for profound leukocytosis (WBC > 600) and anemia. Patient transferred to [address] [place] [address] for further workup and management, working to r/o CML. BMBx on 6/3 pending, PB FISH (+) BCR:ABL fusion. Course complicated by non neutropenic fever (culture neg), hypoxia on RA requiring supplemental O2 via NC now resolved. Patient with thrombocytopenia, anemia, and leukocytosis (being managed with Hydrea secondary to disease.     29M with PMH of spontaneous pneumothorax, who presented to [place] ED with dizziness and vertigo, found to have for profound leukocytosis (WBC > 600) and anemia. Patient transferred to [address] [place] [address] for further workup and management, working to r/o CML. BMBx on 6/3 pending, PB FISH (+) BCR:ABL fusion. Course complicated by non neutropenic fever (culture neg), transaminitis, hypoxia on RA requiring supplemental O2 via NC now resolved. Patient with thrombocytopenia, anemia, and leukocytosis (being managed with Hydrea secondary to disease.     29M with PMH of spontaneous pneumothorax now with diagnosis of CML. 6/3 BMBx c/w CML, PB FISH (+) BCR:ABL fusion. Course complicated by non neutropenic fever (culture neg), transaminitis, hypoxia on RA requiring supplemental O2 via NC now resolved. Patient with anemia, and leukocytosis (being managed with Hydrea) secondary to disease.

## 2024-06-07 NOTE — PROGRESS NOTE ADULT - PROBLEM SELECTOR PLAN 1
Transferred form    Monitor tumor lysis labs BID, CBC w/ dif, transfuse blood products and replete electrolytes PRN.  Strict Is/Os, daily weights, diuresis PRN, IVF.  Hepatitis w/u neg, HIV w/u neg, 6/2 G6PD pending   On allopurinol. Consider rasburicase if uric acid > 8.   6/2 - PB flow cytometry: myeloid immunophenotypic findings show an increase in CD34/ positive myeloblasts,  1.5% of cells, with normal immunophenotype, rare monocytes, and myeloid antigen maturation pattern with marked myeloid left shift and presence of 2% basophils. PB FLT3 (-).  6/2 - PB ABNORMAL FISH - Atypical BCR::ABL1 fusion pattern detected (98.5%)  6/3 - BMBx, follow up results.    6/4 - leukocytosis improving on hydrea continue for now.   6/4 STARTED SPRYCEL 100mg (not on any PPIs), patient provided information on Sprycel and educated on CML.   6/4 Abd US for evaluation of spleen size: spleen measures 24.9 cm.  Monitor tenderness at bmbx site.   Patient enrolling in one month of free drug program. Transferred form    Monitor tumor lysis labs BID, CBC w/ dif, transfuse blood products and replete electrolytes PRN.  Strict Is/Os, daily weights, diuresis PRN, IVF.  Hepatitis w/u neg, HIV w/u neg, 6/2 G6PD pending   On allopurinol. Consider rasburicase if uric acid > 8.   6/2 - PB flow cytometry: myeloid immunophenotypic findings show an increase in CD34/ positive myeloblasts,  1.5% of cells, with normal immunophenotype, rare monocytes, and myeloid antigen maturation pattern with marked myeloid left shift and presence of 2% basophils. PB FLT3 (-).  6/2 - PB ABNORMAL FISH - Atypical BCR::ABL1 fusion pattern detected (98.5%)  6/3 - BMBx, c/w chronic phase CML.  6/4 - leukocytosis improving on hydrea continue for now.   6/4 STARTED SPRYCEL 100mg (not on any PPIs), patient provided information on Sprycel and educated on CML.   6/4 Abd US for evaluation of spleen size: spleen measures 24.9 cm.  6/6 Phoslo 1334 mg PO TID x 2 days  Monitor tenderness at bmbx site.   Patient enrolling in one month of free drug program. Discharge planning pending approval/obtaining of free drug. Patient is off supplemental oxygen, hemodynamically stable, afebrile, not neutropenic, and WBC downtrending. Transferred form    Monitor tumor lysis labs BID, CBC w/ dif, transfuse blood products and replete electrolytes PRN.  Strict Is/Os, daily weights, diuresis PRN, IVF.  Hepatitis w/u neg, HIV w/u neg, 6/2 G6PD pending   On allopurinol. Consider rasburicase if uric acid > 8.   6/2 - PB flow cytometry: myeloid immunophenotypic findings show an increase in CD34/ positive myeloblasts,  1.5% of cells, with normal immunophenotype, rare monocytes, and myeloid antigen maturation pattern with marked myeloid left shift and presence of 2% basophils. PB FLT3 (-).  6/2 - PB ABNORMAL FISH - Atypical BCR::ABL1 fusion pattern detected (98.5%)  6/3 - BMBx, c/w chronic phase CML.  6/4 - leukocytosis improving on hydrea continue for now.   6/4 STARTED SPRYCEL 100mg (not on any PPIs), patient provided information on Sprycel and educated on CML.   6/4 Abd US for evaluation of spleen size: spleen measures 24.9 cm.  6/6 Phoslo 1334 mg PO TID x 2 days  Monitor tenderness at bmbx site.   Patient enrolling in one month of free drug program. Discharge planning pending approval/obtaining of free drug and improvement of lightheadedness. Patient is off supplemental oxygen, hemodynamically stable, afebrile, not neutropenic, and WBC downtrending.

## 2024-06-07 NOTE — PROVIDER CONTACT NOTE (CRITICAL VALUE NOTIFICATION) - PERSON GIVING RESULT:
Rolo Loja from Lab
Serjio Doll, Lab
Thomas Rivas / [facility]
John Leger
Thomas Leger
Vicenta Trivedi/Lab
Andrea Valderrama/[facility]
Lab Dariusz Sargent

## 2024-06-07 NOTE — PROVIDER CONTACT NOTE (CRITICAL VALUE NOTIFICATION) - ASSESSMENT
A&O x4, denies complaint
Pt a&ox4. NAD
VSS, NAD
NAD
VSS, NAD
pt. a & o x 4, weak, dizzy. fever workup performed this AM
NAD
Vital signs within pt baseline. NAD noted.

## 2024-06-07 NOTE — PROGRESS NOTE ADULT - PROBLEM SELECTOR PLAN 5
Hold off on pharmacologic VTE prophylaxis at this time  Encourage OOB and ambulation for VTE ppx  Ocean nasal spray PRN for nasal dryness/nasal congestion (oxygen humidified) Hold off on pharmacologic VTE prophylaxis at this time  Encourage OOB and ambulation for VTE ppx  Ocean nasal spray PRN for nasal dryness/nasal congestion Hold off on pharmacologic VTE prophylaxis at this time  Encourage OOB and ambulation for VTE ppx  Ocean nasal spray PRN for nasal dryness/nasal congestion  Continue to monitor tinnitus, no acute interventions at this time.

## 2024-06-07 NOTE — PROGRESS NOTE ADULT - PROBLEM SELECTOR PLAN 3
Imaging of head to r/o infarct/lesion as dizziness is persisting.  6/1- CTH (-)  6/1- MRI Head (-) Imaging of head to r/o infarct/lesion as dizziness is persisting.  6/1- CTH (-)  6/1- MRI Head (-)  Resolved Imaging of head to r/o infarct/lesion as dizziness is persisting.  6/1- CTH (-)  6/1- MRI Head (-)  Resolved Dizziness/Vertigo - was present for weeks prior to presentation, worsened closer to presentation  Patient with lightheadedness and feeling "wobbly" on 6/7, delaying discharge. PT ordered.  Patient ambulates with significant other and family in the hallways.   Monitor closely, patient instructed to sit down/lay down if feeling lightheaded and notify an RN/provider.

## 2024-06-08 VITALS
WEIGHT: 158.51 LBS | TEMPERATURE: 98 F | HEART RATE: 86 BPM | DIASTOLIC BLOOD PRESSURE: 61 MMHG | RESPIRATION RATE: 18 BRPM | OXYGEN SATURATION: 94 % | SYSTOLIC BLOOD PRESSURE: 111 MMHG

## 2024-06-08 LAB
ALBUMIN SERPL ELPH-MCNC: 4.6 G/DL — SIGNIFICANT CHANGE UP (ref 3.3–5)
ALP SERPL-CCNC: 125 U/L — HIGH (ref 40–120)
ALT FLD-CCNC: 60 U/L — HIGH (ref 10–45)
ANION GAP SERPL CALC-SCNC: 13 MMOL/L — SIGNIFICANT CHANGE UP (ref 5–17)
AST SERPL-CCNC: 53 U/L — HIGH (ref 10–40)
BASOPHILS # BLD AUTO: 13.2 K/UL — HIGH (ref 0–0.2)
BASOPHILS NFR BLD AUTO: 4 % — HIGH (ref 0–2)
BILIRUB SERPL-MCNC: 0.7 MG/DL — SIGNIFICANT CHANGE UP (ref 0.2–1.2)
BLASTS # FLD: 1 % — HIGH (ref 0–0)
BUN SERPL-MCNC: 24 MG/DL — HIGH (ref 7–23)
CALCIUM SERPL-MCNC: 10 MG/DL — SIGNIFICANT CHANGE UP (ref 8.4–10.5)
CHLORIDE SERPL-SCNC: 99 MMOL/L — SIGNIFICANT CHANGE UP (ref 96–108)
CO2 SERPL-SCNC: 25 MMOL/L — SIGNIFICANT CHANGE UP (ref 22–31)
CREAT SERPL-MCNC: 0.82 MG/DL — SIGNIFICANT CHANGE UP (ref 0.5–1.3)
EGFR: 122 ML/MIN/1.73M2 — SIGNIFICANT CHANGE UP
EOSINOPHIL # BLD AUTO: 16.5 K/UL — HIGH (ref 0–0.5)
EOSINOPHIL NFR BLD AUTO: 5 % — SIGNIFICANT CHANGE UP (ref 0–6)
GLUCOSE SERPL-MCNC: 90 MG/DL — SIGNIFICANT CHANGE UP (ref 70–99)
HCT VFR BLD CALC: 28.5 % — LOW (ref 39–50)
HGB BLD-MCNC: 9.7 G/DL — LOW (ref 13–17)
LDH SERPL L TO P-CCNC: 1187 U/L — HIGH (ref 50–242)
LYMPHOCYTES # BLD AUTO: 0 % — LOW (ref 13–44)
LYMPHOCYTES # BLD AUTO: 0 K/UL — LOW (ref 1–3.3)
MAGNESIUM SERPL-MCNC: 2.5 MG/DL — SIGNIFICANT CHANGE UP (ref 1.6–2.6)
MANUAL SMEAR VERIFICATION: SIGNIFICANT CHANGE UP
MCHC RBC-ENTMCNC: 31 PG — SIGNIFICANT CHANGE UP (ref 27–34)
MCHC RBC-ENTMCNC: 34 GM/DL — SIGNIFICANT CHANGE UP (ref 32–36)
MCV RBC AUTO: 91.1 FL — SIGNIFICANT CHANGE UP (ref 80–100)
METAMYELOCYTES # FLD: 5 % — HIGH (ref 0–0)
MONOCYTES # BLD AUTO: 16.5 K/UL — HIGH (ref 0–0.9)
MONOCYTES NFR BLD AUTO: 5 % — SIGNIFICANT CHANGE UP (ref 2–14)
MYELOCYTES NFR BLD: 13 % — HIGH (ref 0–0)
NEUTROPHILS # BLD AUTO: 201.24 K/UL — HIGH (ref 1.8–7.4)
NEUTROPHILS NFR BLD AUTO: 56 % — SIGNIFICANT CHANGE UP (ref 43–77)
NEUTS BAND # BLD: 5 % — SIGNIFICANT CHANGE UP (ref 0–8)
NRBC # BLD: 2 /100 WBCS — HIGH (ref 0–0)
PHOSPHATE SERPL-MCNC: 4.6 MG/DL — HIGH (ref 2.5–4.5)
PLAT MORPH BLD: NORMAL — SIGNIFICANT CHANGE UP
PLATELET # BLD AUTO: 226 K/UL — SIGNIFICANT CHANGE UP (ref 150–400)
POTASSIUM SERPL-MCNC: 3.9 MMOL/L — SIGNIFICANT CHANGE UP (ref 3.5–5.3)
POTASSIUM SERPL-SCNC: 3.9 MMOL/L — SIGNIFICANT CHANGE UP (ref 3.5–5.3)
PROMYELOCYTES # FLD: 6 % — HIGH (ref 0–0)
PROT SERPL-MCNC: 7.4 G/DL — SIGNIFICANT CHANGE UP (ref 6–8.3)
RBC # BLD: 3.13 M/UL — LOW (ref 4.2–5.8)
RBC # FLD: 18.9 % — HIGH (ref 10.3–14.5)
RBC BLD AUTO: SIGNIFICANT CHANGE UP
SODIUM SERPL-SCNC: 137 MMOL/L — SIGNIFICANT CHANGE UP (ref 135–145)
URATE SERPL-MCNC: 5.1 MG/DL — SIGNIFICANT CHANGE UP (ref 3.4–8.8)
WBC # BLD: 329.9 K/UL — CRITICAL HIGH (ref 3.8–10.5)
WBC # FLD AUTO: 329.9 K/UL — CRITICAL HIGH (ref 3.8–10.5)

## 2024-06-08 PROCEDURE — 81206 BCR/ABL1 GENE MAJOR BP: CPT

## 2024-06-08 PROCEDURE — 81245 FLT3 GENE: CPT

## 2024-06-08 PROCEDURE — 85045 AUTOMATED RETICULOCYTE COUNT: CPT

## 2024-06-08 PROCEDURE — 93005 ELECTROCARDIOGRAM TRACING: CPT

## 2024-06-08 PROCEDURE — 82962 GLUCOSE BLOOD TEST: CPT

## 2024-06-08 PROCEDURE — 83735 ASSAY OF MAGNESIUM: CPT

## 2024-06-08 PROCEDURE — 88184 FLOWCYTOMETRY/ TC 1 MARKER: CPT

## 2024-06-08 PROCEDURE — 83615 LACTATE (LD) (LDH) ENZYME: CPT

## 2024-06-08 PROCEDURE — 71045 X-RAY EXAM CHEST 1 VIEW: CPT

## 2024-06-08 PROCEDURE — 83036 HEMOGLOBIN GLYCOSYLATED A1C: CPT

## 2024-06-08 PROCEDURE — 81450 HL NEO GSAP 5-50DNA/DNA&RNA: CPT

## 2024-06-08 PROCEDURE — 85379 FIBRIN DEGRADATION QUANT: CPT

## 2024-06-08 PROCEDURE — 93356 MYOCRD STRAIN IMG SPCKL TRCK: CPT

## 2024-06-08 PROCEDURE — 80053 COMPREHEN METABOLIC PANEL: CPT

## 2024-06-08 PROCEDURE — 81246 FLT3 GENE ANALYSIS: CPT

## 2024-06-08 PROCEDURE — 93306 TTE W/DOPPLER COMPLETE: CPT

## 2024-06-08 PROCEDURE — 86706 HEP B SURFACE ANTIBODY: CPT

## 2024-06-08 PROCEDURE — 85384 FIBRINOGEN ACTIVITY: CPT

## 2024-06-08 PROCEDURE — 86923 COMPATIBILITY TEST ELECTRIC: CPT

## 2024-06-08 PROCEDURE — 88341 IMHCHEM/IMCYTCHM EA ADD ANTB: CPT

## 2024-06-08 PROCEDURE — 88342 IMHCHEM/IMCYTCHM 1ST ANTB: CPT

## 2024-06-08 PROCEDURE — 84100 ASSAY OF PHOSPHORUS: CPT

## 2024-06-08 PROCEDURE — 88264 CHROMOSOME ANALYSIS 20-25: CPT

## 2024-06-08 PROCEDURE — 84443 ASSAY THYROID STIM HORMONE: CPT

## 2024-06-08 PROCEDURE — 88185 FLOWCYTOMETRY/TC ADD-ON: CPT

## 2024-06-08 PROCEDURE — 87040 BLOOD CULTURE FOR BACTERIA: CPT

## 2024-06-08 PROCEDURE — 88280 CHROMOSOME KARYOTYPE STUDY: CPT

## 2024-06-08 PROCEDURE — 82955 ASSAY OF G6PD ENZYME: CPT

## 2024-06-08 PROCEDURE — P9040: CPT

## 2024-06-08 PROCEDURE — 81003 URINALYSIS AUTO W/O SCOPE: CPT

## 2024-06-08 PROCEDURE — 81451 HL NEO GSAP 5-50 RNA ALYS: CPT

## 2024-06-08 PROCEDURE — 85027 COMPLETE CBC AUTOMATED: CPT

## 2024-06-08 PROCEDURE — 80061 LIPID PANEL: CPT

## 2024-06-08 PROCEDURE — 84550 ASSAY OF BLOOD/URIC ACID: CPT

## 2024-06-08 PROCEDURE — 99239 HOSP IP/OBS DSCHRG MGMT >30: CPT

## 2024-06-08 PROCEDURE — 80074 ACUTE HEPATITIS PANEL: CPT

## 2024-06-08 PROCEDURE — 86900 BLOOD TYPING SEROLOGIC ABO: CPT

## 2024-06-08 PROCEDURE — 85730 THROMBOPLASTIN TIME PARTIAL: CPT

## 2024-06-08 PROCEDURE — 88313 SPECIAL STAINS GROUP 2: CPT

## 2024-06-08 PROCEDURE — 87205 SMEAR GRAM STAIN: CPT

## 2024-06-08 PROCEDURE — 86850 RBC ANTIBODY SCREEN: CPT

## 2024-06-08 PROCEDURE — 36430 TRANSFUSION BLD/BLD COMPNT: CPT

## 2024-06-08 PROCEDURE — 86901 BLOOD TYPING SEROLOGIC RH(D): CPT

## 2024-06-08 PROCEDURE — 85610 PROTHROMBIN TIME: CPT

## 2024-06-08 PROCEDURE — 88275 CYTOGENETICS 100-300: CPT

## 2024-06-08 PROCEDURE — 87389 HIV-1 AG W/HIV-1&-2 AB AG IA: CPT

## 2024-06-08 PROCEDURE — 88271 CYTOGENETICS DNA PROBE: CPT

## 2024-06-08 PROCEDURE — 36415 COLL VENOUS BLD VENIPUNCTURE: CPT

## 2024-06-08 PROCEDURE — 85025 COMPLETE CBC W/AUTO DIFF WBC: CPT

## 2024-06-08 PROCEDURE — 87086 URINE CULTURE/COLONY COUNT: CPT

## 2024-06-08 PROCEDURE — 83010 ASSAY OF HAPTOGLOBIN QUANT: CPT

## 2024-06-08 PROCEDURE — 76705 ECHO EXAM OF ABDOMEN: CPT

## 2024-06-08 PROCEDURE — 70551 MRI BRAIN STEM W/O DYE: CPT | Mod: MC

## 2024-06-08 PROCEDURE — 81207 BCR/ABL1 GENE MINOR BP: CPT

## 2024-06-08 PROCEDURE — 81455 SO/HL 51/>GSAP DNA/DNA&RNA: CPT

## 2024-06-08 PROCEDURE — 88305 TISSUE EXAM BY PATHOLOGIST: CPT

## 2024-06-08 PROCEDURE — 88237 TISSUE CULTURE BONE MARROW: CPT

## 2024-06-08 RX ORDER — CALCIUM ACETATE 667 MG
1334 TABLET ORAL ONCE
Refills: 0 | Status: COMPLETED | OUTPATIENT
Start: 2024-06-08 | End: 2024-06-08

## 2024-06-08 RX ADMIN — HYDROXYUREA 2000 MILLIGRAM(S): 500 CAPSULE ORAL at 11:02

## 2024-06-08 RX ADMIN — Medication 1334 MILLIGRAM(S): at 11:02

## 2024-06-08 RX ADMIN — DASATINIB 100 MILLIGRAM(S): 80 TABLET ORAL at 11:02

## 2024-06-08 RX ADMIN — Medication 300 MILLIGRAM(S): at 11:02

## 2024-06-08 NOTE — PHYSICAL THERAPY INITIAL EVALUATION ADULT - PERTINENT HX OF CURRENT PROBLEM, REHAB EVAL
as per chart review: 29M with PMH of spontaneous pneumothorax now with diagnosis of CML. 6/3 BMBx c/w CML, PB FISH (+) BCR:ABL fusion. Sprycel started on 6/4. Course complicated by non neutropenic fever (culture neg), transaminitis, hypoxia on RA requiring supplemental O2 via NC now resolved. Patient with anemia, and leukocytosis (being managed with Hydrea) secondary to disease. TESTS: 6/1 CT Head: No large territory acute infarct, intracranial hemorrhage, or mass effect. Stable exam compared to prior study from January 2016. 6/2 MR Head: No acute infarction. Mild nonspecific chronic white matter   changes. 6/2 CXR: clear lungs; US spleen: The spleen measures 24.9 cm. On CT 10/23/2019, the spleen measures approximately 12 cm.

## 2024-06-08 NOTE — PROGRESS NOTE ADULT - SUBJECTIVE AND OBJECTIVE BOX
Diagnosis: CML - BCR:ABL (+)    Protocol/Chemo Regimen: Dasatinib 100mg daily started 6/4    Day: 5    Pt endorsed: +left ear tinnitus persists but improved, lightheadedness much improved (none today), ambulating in the hallways and feeling ready to go home    Review of Systems:  Denies any nausea, vomiting, diarrhea, chest pain, abdominal pain.    Pain scale: Denies    Diet: Regular    Allergies: No Known Allergies    ANTIMICROBIALS      HEME/ONC MEDICATIONS  dasatinib 100 milliGRAM(s) Oral daily  hydroxyurea 2000 milliGRAM(s) Oral every 12 hours      STANDING MEDICATIONS  allopurinol 300 milliGRAM(s) Oral daily  calcium acetate 1334 milliGRAM(s) Oral once  lactated ringers. 1000 milliLiter(s) IV Continuous <Continuous>      PRN MEDICATIONS  acetaminophen     Tablet .. 650 milliGRAM(s) Oral every 6 hours PRN  aluminum hydroxide/magnesium hydroxide/simethicone Suspension 30 milliLiter(s) Oral every 4 hours PRN  melatonin 3 milliGRAM(s) Oral at bedtime PRN  ondansetron Injectable 4 milliGRAM(s) IV Push every 8 hours PRN  sodium chloride 0.65% Nasal 1 Spray(s) Both Nostrils two times a day PRN        Vital Signs Last 24 Hrs  T(C): 36.5 (08 Jun 2024 09:10), Max: 37 (07 Jun 2024 17:48)  T(F): 97.7 (08 Jun 2024 09:10), Max: 98.6 (07 Jun 2024 17:48)  HR: 86 (08 Jun 2024 09:10) (80 - 95)  BP: 111/61 (08 Jun 2024 09:10) (111/61 - 130/69)  BP(mean): --  RR: 18 (08 Jun 2024 09:10) (18 - 18)  SpO2: 94% (08 Jun 2024 09:10) (93% - 97%)    Parameters below as of 08 Jun 2024 09:10  Patient On (Oxygen Delivery Method): room air        PHYSICAL EXAM  General: NAD, resting in bed.  HEENT: Clear oropharynx, anicteric sclera, pink conjunctiva  CV: (+) S1/S2 RRR  Lungs: clear to auscultation, no wheezes   Abdomen: soft, non-tender, non-distended (+) BS  Ext: no edema  Skin: no rashes    MSK: gait normal, ambulating in the halls without assistance or assistive device, steady  Neuro: alert and oriented X 3, no focal deficits  Central Line: PIVL          LABS:             9.7    329.90 )-----------( 226      ( 08 Jun 2024 07:33 )             28.5     Mean Cell Volume : 91.1 fl  Mean Cell Hemoglobin : 31.0 pg  Mean Cell Hemoglobin Concentration : 34.0 gm/dL  Auto Neutrophil # : 201.24 K/uL  Auto Lymphocyte # : 0.00 K/uL  Auto Monocyte # : 16.50 K/uL  Auto Eosinophil # : 16.50 K/uL  Auto Basophil # : 13.20 K/uL  Auto Neutrophil % : 56.0 %  Auto Lymphocyte % : 0.0 %  Auto Monocyte % : 5.0 %  Auto Eosinophil % : 5.0 %  Auto Basophil % : 4.0 %    06-08    137  |  99  |  24<H>  ----------------------------<  90  3.9   |  25  |  0.82    Ca    10.0      08 Jun 2024 07:33  Phos  4.6     06-08  Mg     2.5     06-08    TPro  7.4  /  Alb  4.6  /  TBili  0.7  /  DBili  x   /  AST  53<H>  /  ALT  60<H>  /  AlkPhos  125<H>  06-08    Mg 2.5  Phos 4.6    LDH 1187  Uric Acid 5.1              RECENT CULTURES:  Culture - Blood (06.02.24 @ 04:50)    Specimen Source: .Blood Blood-Peripheral   Culture Results:   No growth at 24 hours    Culture - Blood (06.02.24 @ 04:25)    Specimen Source: .Blood Blood-Peripheral   Culture Results:   No growth at 24 hours    Culture - Urine (06.02.24 @ 02:45)    Specimen Source: Clean Catch Clean Catch (Midstream)   Culture Results:   <10,000 CFU/mL Normal Urogenital Minerva        RADIOLOGY & ADDITIONAL STUDIES:  Hematopathology Report (06.03.24 @ 11:50)  Specimen(s) Submitted  1  Left pic bone marrow biopsy  Final Diagnosis  1, 2. Bone Marrow Biopsy, Clot and Bone Marrow Aspirate, Left PIC  - Chronic Myeloid Leukemia, Chronic Phase, BCR/ABL1 positive.  - FISH studies detected atypical BCR::ABL1 fusion pattern  (98.5%).  - Quantitative BCR-ABL by real time RT-PCR positive  for p210(BCR-ABL1: >10.000 %  on the International Scale) and p190  (%BCR-ABL/ABL= 0.031 %).  See note and description.    Flow cytometry, Blood:  Date Collected: 06/02/2024  Specimen Type: Peripheral Blood smear ([id number])  Test Requested: FISH Analysis  FISH Result: ABNORMAL FISH - Atypical BCR::ABL1 fusion pattern detected  (98.5%)  BCR Final Report  Accession Number: [id number]  Specimen: .Blood  Test Performed: Quantitative BCR-ABL by real time RT-PCR  RESULTS:  p210: Positive  BCR-ABL1: >10.000 %  on the International Scale.  p190: Positive  %BCR-ABL/ABL= 0.031 %  RESULTS:  FLT3-ITD mutation: NEGATIVE  FLT3-TKD mutation: NEGATIVE    US Spleen (06.04.24 @ 15:51)   IMPRESSION:  The spleen measures 24.9 cm. On CT 10/23/2019, the spleen measures   approximately 12 cm.    Xray Chest 1 View- PORTABLE-Urgent (Xray Chest 1 View- PORTABLE-Urgent .) (06.02.24 @ 06:25)   Clear lungs. Findings unchanged    Peripheral Blood Flow Cytometry (06.02.24)  Peripheral blood:       - The lymphocyte immunophenotypic findings show no diagnostic abnormalities with lymphopenia.       - The myeloid immunophenotypic findings show an increase in CD34/ positive myeloblasts,  1.5% of cells, with normal immunophenotype, rare monocytes, and myeloid antigen maturation pattern  with marked myeloid left shift and presence of 2% basophils.       -Correlation with cytogenetics, FISH, and molecular studies is necessary.  Please see interpretation.  MORPHOLOGY: Lymphopenia with marked leukocytosis and myeloid left shift, few blasts, no monocytes,  increased eosinophils

## 2024-06-08 NOTE — PROGRESS NOTE ADULT - NS ATTEST RISK PROBLEM GEN_ALL_CORE FT
Patient has suspected leukemia, which carries a risk for infection, bleeding, and other life-threatening complications.

## 2024-06-08 NOTE — PROGRESS NOTE ADULT - PROBLEM SELECTOR PLAN 1
Transferred form    Monitor tumor lysis labs BID, CBC w/ dif, transfuse blood products and replete electrolytes PRN.  Strict Is/Os, daily weights, diuresis PRN, IVF.  Hepatitis w/u neg, HIV w/u neg, 6/2 G6PD pending   On allopurinol. Consider rasburicase if uric acid > 8.   6/2 - PB flow cytometry: myeloid immunophenotypic findings show an increase in CD34/ positive myeloblasts,  1.5% of cells, with normal immunophenotype, rare monocytes, and myeloid antigen maturation pattern with marked myeloid left shift and presence of 2% basophils. PB FLT3 (-).  6/2 - PB ABNORMAL FISH - Atypical BCR::ABL1 fusion pattern detected (98.5%)  6/3 - BMBx, c/w chronic phase CML.  6/4 - leukocytosis improving on hydrea continue for now.   6/4 STARTED SPRYCEL 100mg (not on any PPIs), patient provided information on Sprycel and educated on CML.   6/4 Abd US for evaluation of spleen size: spleen measures 24.9 cm.  6/8 Phoslo 1334 mg PO x 1 for hyperphosphatemia.  Monitor tenderness at bmbx site.   Patient enrolling in one month of free drug program. Discharge planning pending approval/obtaining of free drug and improvement of lightheadedness. Patient is off supplemental oxygen, hemodynamically stable, afebrile, not neutropenic, and WBC downtrending.  Discharge planning for 6/8.

## 2024-06-08 NOTE — PROGRESS NOTE ADULT - PROBLEM SELECTOR PLAN 5
Hold off on pharmacologic VTE prophylaxis at this time  Encourage OOB and ambulation for VTE ppx  Ocean nasal spray PRN for nasal dryness/nasal congestion

## 2024-06-08 NOTE — PROGRESS NOTE ADULT - PROBLEM/PLAN-2
DISPLAY PLAN FREE TEXT
57
DISPLAY PLAN FREE TEXT

## 2024-06-08 NOTE — PROGRESS NOTE ADULT - PROBLEM SELECTOR PROBLEM 4
Left ventricular hypertrophy

## 2024-06-08 NOTE — PROGRESS NOTE ADULT - ASSESSMENT
29M with PMH of spontaneous pneumothorax now with diagnosis of CML. 6/3 BMBx c/w CML, PB FISH (+) BCR:ABL fusion. Course complicated by non neutropenic fever (culture neg), transaminitis, hypoxia on RA requiring supplemental O2 via NC now resolved. Patient with anemia, and leukocytosis (being managed with Hydrea) secondary to disease.     29M with PMH of spontaneous pneumothorax now with diagnosis of CML. 6/3 BMBx c/w CML, PB FISH (+) BCR:ABL fusion. Sprycel started on 6/4. Course complicated by non neutropenic fever (culture neg), transaminitis, hypoxia on RA requiring supplemental O2 via NC now resolved. Patient with anemia, and leukocytosis (being managed with Hydrea) secondary to disease.

## 2024-06-08 NOTE — PROGRESS NOTE ADULT - PROBLEM SELECTOR PLAN 6
Transaminitis    6/8 stable  Follow up daily on CMP while inpatient and then continue to follow up outpatient
Transaminitis 6/4 on PM labs  Stable since  Follow up daily on CMP while inpatient and then continue to follow up outpatient

## 2024-06-08 NOTE — PROGRESS NOTE ADULT - NS ATTEND AMEND GEN_ALL_CORE FT
.  Primary: Maury    Vital Signs Last 24 Hrs  T(C): 36.8 (08 Jun 2024 05:26), Max: 37 (07 Jun 2024 17:48)  T(F): 98.2 (08 Jun 2024 05:26), Max: 98.6 (07 Jun 2024 17:48)  HR: 80 (08 Jun 2024 05:26) (80 - 95)  BP: 114/75 (08 Jun 2024 05:26) (111/61 - 130/69)  BP(mean): --  RR: 18 (08 Jun 2024 05:26) (18 - 18)  SpO2: 95% (08 Jun 2024 05:26) (93% - 97%)    Parameters below as of 08 Jun 2024 05:26  Patient On (Oxygen Delivery Method): room air    MEDICATIONS  (STANDING):  allopurinol 300 milliGRAM(s) Oral daily  dasatinib 100 milliGRAM(s) Oral daily  hydroxyurea 2000 milliGRAM(s) Oral every 12 hours  lactated ringers. 1000 milliLiter(s) (100 mL/Hr) IV Continuous <Continuous>      Assessment: 29M with receiving TKI and HU for CML.    PMH: spontaneous pneumothorax,    Heme:   Continue allopurinol   Sprycel (6/4/24 - ) . Free 1 month supply provided  - Cont hydroxyurea 2 g BID for cytoreduction, decrease to 1 g BID upon discharge    - Lab appt on tuesday 6/11/24  - Dr Mendiola appt on 6/19/24

## 2024-06-08 NOTE — PROGRESS NOTE ADULT - PROBLEM SELECTOR PLAN 3
Imaging of head to r/o infarct/lesion as dizziness is persisting.  6/1- CTH (-)  6/1- MRI Head (-)  Resolved Dizziness/Vertigo - was present for weeks prior to presentation, worsened closer to presentation  Patient with lightheadedness and feeling "wobbly" on 6/7, delaying discharge. PT ordered.  Patient ambulates with significant other and family in the hallways.   Monitor closely, patient instructed to sit down/lay down if feeling lightheaded and notify an RN/provider.  6/8 patient now without lightheadedness, walking in the hallways without issue and without assistance, does not feel wobbly or unstable, gait appears normal and steady

## 2024-06-08 NOTE — PROGRESS NOTE ADULT - PROBLEM SELECTOR PROBLEM 1
Chronic myeloid leukemia

## 2024-06-11 ENCOUNTER — RESULT REVIEW (OUTPATIENT)
Age: 29
End: 2024-06-11

## 2024-06-11 ENCOUNTER — NON-APPOINTMENT (OUTPATIENT)
Age: 29
End: 2024-06-11

## 2024-06-11 ENCOUNTER — APPOINTMENT (OUTPATIENT)
Dept: HEMATOLOGY ONCOLOGY | Facility: CLINIC | Age: 29
End: 2024-06-11

## 2024-06-11 LAB — G6PD RBC-CCNC: SIGNIFICANT CHANGE UP

## 2024-06-19 ENCOUNTER — APPOINTMENT (OUTPATIENT)
Dept: HEMATOLOGY ONCOLOGY | Facility: CLINIC | Age: 29
End: 2024-06-19
Payer: COMMERCIAL

## 2024-06-19 ENCOUNTER — RESULT REVIEW (OUTPATIENT)
Age: 29
End: 2024-06-19

## 2024-06-19 VITALS
DIASTOLIC BLOOD PRESSURE: 73 MMHG | TEMPERATURE: 97.1 F | SYSTOLIC BLOOD PRESSURE: 122 MMHG | RESPIRATION RATE: 16 BRPM | BODY MASS INDEX: 24.22 KG/M2 | OXYGEN SATURATION: 100 % | HEIGHT: 67.52 IN | HEART RATE: 82 BPM | WEIGHT: 157.98 LBS

## 2024-06-19 PROCEDURE — 99215 OFFICE O/P EST HI 40 MIN: CPT

## 2024-06-19 PROCEDURE — G2211 COMPLEX E/M VISIT ADD ON: CPT

## 2024-06-19 NOTE — ASSESSMENT
[FreeTextEntry1] : 28yo M w/ CML here for f/u. He was admitted 6/1/24 with WBC >650 and anemia (Hgb in 7s).  BMbx showing CML US spleen size of 24.9cm  continue Sprycel, started on 6/4/24 His WBC today is 36 - results reviewed with pt and mom. Will hold further Hydrea Monitor counts every 2 weeks for now He has neutropenic PPx at home in case his counts drop - does not need to take currently Will refer to ENT given decreased hearing and ringing Hgb 8.8 today - does not need transfusions All questions answered RTC 2 weeks

## 2024-06-19 NOTE — HISTORY OF PRESENT ILLNESS
[de-identified] : 29M with PMH of spontaneous pneumothorax, CML here for f/u.   He initially presented to [facility] ED with dizziness and vertigo on 6/1/24, found to have for profound leukocytosis (WBC > 650) and anemia. Patient transferred to [address] [place] [address] for further workup and management.  CTH (-) and MRI Head (-). Started on Hydrea and received PRBC transfusions which helped his symptoms.  BMBx dry tap on but good core c/w CML. 6/2 - PB flow cytometry: myeloid immunophenotypic findings show an increase in CD34/ positive myeloblasts, 1.5% of cells, with normal immunophenotype, rare monocytes, and myeloid antigen maturation pattern with marked myeloid left shift and presence of 2% basophils. PB FLT3 (-).  PB ABNORMAL FISH - Atypical BCR::ABL1 fusion pattern detected (98.5%). Treatment for CML started on 6/4/24 with Sprycel.  Dizziness present on presentation resolved, patient with occasional lightheadedness with blood draws and when taking dasatinib which improves with eating food and resting.  [de-identified] : He had some rash last week that has now resolved Notes decreased hearing and ringing in left ear.

## 2024-06-20 LAB
ALBUMIN SERPL ELPH-MCNC: 4.9 G/DL
ALP BLD-CCNC: 128 U/L
ALT SERPL-CCNC: 146 U/L
ANION GAP SERPL CALC-SCNC: 15 MMOL/L
AST SERPL-CCNC: 94 U/L
BILIRUB SERPL-MCNC: 0.7 MG/DL
BUN SERPL-MCNC: 25 MG/DL
CALCIUM SERPL-MCNC: 9.2 MG/DL
CHLORIDE SERPL-SCNC: 102 MMOL/L
CHROM ANALY OVERALL INTERP SPEC-IMP: SIGNIFICANT CHANGE UP
CO2 SERPL-SCNC: 21 MMOL/L
CREAT SERPL-MCNC: 0.82 MG/DL
EGFR: 122 ML/MIN/1.73M2
GLUCOSE SERPL-MCNC: 100 MG/DL
LDH SERPL-CCNC: 765 U/L
MAGNESIUM SERPL-MCNC: 2.6 MG/DL
PHOSPHATE SERPL-MCNC: 3.5 MG/DL
POTASSIUM SERPL-SCNC: 4.5 MMOL/L
PROT SERPL-MCNC: 7.5 G/DL
SODIUM SERPL-SCNC: 139 MMOL/L
URATE SERPL-MCNC: 4.9 MG/DL

## 2024-06-24 LAB
ALBUMIN SERPL ELPH-MCNC: 4.8 G/DL
ALP BLD-CCNC: 91 U/L
ALT SERPL-CCNC: 148 U/L
ANION GAP SERPL CALC-SCNC: 12 MMOL/L
AST SERPL-CCNC: 77 U/L
BILIRUB SERPL-MCNC: 0.7 MG/DL
BUN SERPL-MCNC: 20 MG/DL
CALCIUM SERPL-MCNC: 9.8 MG/DL
CHLORIDE SERPL-SCNC: 102 MMOL/L
CO2 SERPL-SCNC: 24 MMOL/L
CREAT SERPL-MCNC: 0.8 MG/DL
EGFR: 123 ML/MIN/1.73M2
GLUCOSE SERPL-MCNC: 102 MG/DL
POTASSIUM SERPL-SCNC: 4.8 MMOL/L
PROT SERPL-MCNC: 7.1 G/DL
SODIUM SERPL-SCNC: 138 MMOL/L

## 2024-06-25 ENCOUNTER — APPOINTMENT (OUTPATIENT)
Dept: OTOLARYNGOLOGY | Facility: CLINIC | Age: 29
End: 2024-06-25
Payer: COMMERCIAL

## 2024-06-25 ENCOUNTER — NON-APPOINTMENT (OUTPATIENT)
Age: 29
End: 2024-06-25

## 2024-06-25 VITALS — HEART RATE: 88 BPM | TEMPERATURE: 98.4 F | DIASTOLIC BLOOD PRESSURE: 62 MMHG | SYSTOLIC BLOOD PRESSURE: 115 MMHG

## 2024-06-25 DIAGNOSIS — H93.292 OTHER ABNORMAL AUDITORY PERCEPTIONS, LEFT EAR: ICD-10-CM

## 2024-06-25 DIAGNOSIS — C92.10 CHRONIC MYELOID LEUKEMIA, BCR/ABL-POSITIVE, NOT HAVING ACHIEVED REMISSION: ICD-10-CM

## 2024-06-25 DIAGNOSIS — H93.12 TINNITUS, LEFT EAR: ICD-10-CM

## 2024-06-25 DIAGNOSIS — H91.20 SUDDEN IDIOPATHIC HEARING LOSS, UNSPECIFIED EAR: ICD-10-CM

## 2024-06-25 LAB — T(9;22)(ABL1,BCR)/CONTROL BLD/T: NORMAL

## 2024-06-25 PROCEDURE — 92567 TYMPANOMETRY: CPT

## 2024-06-25 PROCEDURE — 99203 OFFICE O/P NEW LOW 30 MIN: CPT

## 2024-06-25 PROCEDURE — 92557 COMPREHENSIVE HEARING TEST: CPT

## 2024-06-25 RX ORDER — DASATINIB 100 MG/1
100 TABLET ORAL
Refills: 0 | Status: ACTIVE | COMMUNITY

## 2024-06-25 RX ORDER — PREDNISONE 10 MG/1
10 TABLET ORAL
Qty: 58 | Refills: 0 | Status: ACTIVE | COMMUNITY
Start: 2024-06-25 | End: 1900-01-01

## 2024-07-03 ENCOUNTER — RESULT REVIEW (OUTPATIENT)
Age: 29
End: 2024-07-03

## 2024-07-03 ENCOUNTER — APPOINTMENT (OUTPATIENT)
Dept: HEMATOLOGY ONCOLOGY | Facility: CLINIC | Age: 29
End: 2024-07-03
Payer: COMMERCIAL

## 2024-07-03 VITALS
TEMPERATURE: 98.1 F | HEART RATE: 77 BPM | WEIGHT: 156.53 LBS | OXYGEN SATURATION: 99 % | SYSTOLIC BLOOD PRESSURE: 119 MMHG | DIASTOLIC BLOOD PRESSURE: 79 MMHG | BODY MASS INDEX: 24.14 KG/M2 | RESPIRATION RATE: 16 BRPM

## 2024-07-03 PROCEDURE — G2211 COMPLEX E/M VISIT ADD ON: CPT

## 2024-07-03 PROCEDURE — 99214 OFFICE O/P EST MOD 30 MIN: CPT

## 2024-07-08 LAB
ALBUMIN SERPL ELPH-MCNC: 5.2 G/DL
ALP BLD-CCNC: 82 U/L
ALT SERPL-CCNC: 63 U/L
ANION GAP SERPL CALC-SCNC: 14 MMOL/L
AST SERPL-CCNC: 24 U/L
BILIRUB SERPL-MCNC: 0.9 MG/DL
BUN SERPL-MCNC: 18 MG/DL
CALCIUM SERPL-MCNC: 9.7 MG/DL
CHLORIDE SERPL-SCNC: 99 MMOL/L
CO2 SERPL-SCNC: 25 MMOL/L
CREAT SERPL-MCNC: 0.84 MG/DL
EGFR: 121 ML/MIN/1.73M2
GLUCOSE SERPL-MCNC: 106 MG/DL
POTASSIUM SERPL-SCNC: 3.7 MMOL/L
PROT SERPL-MCNC: 7.7 G/DL
SODIUM SERPL-SCNC: 138 MMOL/L

## 2024-07-09 LAB — T(9;22)(ABL1,BCR)/CONTROL BLD/T: NORMAL

## 2024-07-11 ENCOUNTER — APPOINTMENT (OUTPATIENT)
Dept: OTOLARYNGOLOGY | Facility: CLINIC | Age: 29
End: 2024-07-11

## 2024-07-16 ENCOUNTER — APPOINTMENT (OUTPATIENT)
Dept: OTOLARYNGOLOGY | Facility: CLINIC | Age: 29
End: 2024-07-16
Payer: COMMERCIAL

## 2024-07-16 ENCOUNTER — NON-APPOINTMENT (OUTPATIENT)
Age: 29
End: 2024-07-16

## 2024-07-16 VITALS — WEIGHT: 156 LBS | HEIGHT: 68 IN | BODY MASS INDEX: 23.64 KG/M2

## 2024-07-16 DIAGNOSIS — H93.12 TINNITUS, LEFT EAR: ICD-10-CM

## 2024-07-16 DIAGNOSIS — R42 DIZZINESS AND GIDDINESS: ICD-10-CM

## 2024-07-16 DIAGNOSIS — H91.20 SUDDEN IDIOPATHIC HEARING LOSS, UNSPECIFIED EAR: ICD-10-CM

## 2024-07-16 PROCEDURE — 92557 COMPREHENSIVE HEARING TEST: CPT

## 2024-07-16 PROCEDURE — 92567 TYMPANOMETRY: CPT

## 2024-07-16 PROCEDURE — 99213 OFFICE O/P EST LOW 20 MIN: CPT

## 2024-07-17 ENCOUNTER — RESULT REVIEW (OUTPATIENT)
Age: 29
End: 2024-07-17

## 2024-07-17 ENCOUNTER — APPOINTMENT (OUTPATIENT)
Dept: HEMATOLOGY ONCOLOGY | Facility: CLINIC | Age: 29
End: 2024-07-17
Payer: COMMERCIAL

## 2024-07-17 VITALS
SYSTOLIC BLOOD PRESSURE: 119 MMHG | OXYGEN SATURATION: 99 % | DIASTOLIC BLOOD PRESSURE: 74 MMHG | HEART RATE: 80 BPM | BODY MASS INDEX: 23.8 KG/M2 | TEMPERATURE: 98.2 F | WEIGHT: 156.53 LBS | RESPIRATION RATE: 16 BRPM

## 2024-07-17 DIAGNOSIS — C92.10 CHRONIC MYELOID LEUKEMIA, BCR/ABL-POSITIVE, NOT HAVING ACHIEVED REMISSION: ICD-10-CM

## 2024-07-17 PROCEDURE — 99214 OFFICE O/P EST MOD 30 MIN: CPT

## 2024-07-17 PROCEDURE — G2211 COMPLEX E/M VISIT ADD ON: CPT

## 2024-07-26 LAB
ALBUMIN SERPL ELPH-MCNC: 5.2 G/DL
ALP BLD-CCNC: 73 U/L
ALT SERPL-CCNC: 51 U/L
ANION GAP SERPL CALC-SCNC: 13 MMOL/L
AST SERPL-CCNC: 29 U/L
BILIRUB SERPL-MCNC: 1 MG/DL
BUN SERPL-MCNC: 19 MG/DL
CALCIUM SERPL-MCNC: 9.8 MG/DL
CHLORIDE SERPL-SCNC: 102 MMOL/L
CO2 SERPL-SCNC: 24 MMOL/L
CREAT SERPL-MCNC: 1.04 MG/DL
EGFR: 100 ML/MIN/1.73M2
GLUCOSE SERPL-MCNC: 100 MG/DL
POTASSIUM SERPL-SCNC: 4.3 MMOL/L
PROT SERPL-MCNC: 7.6 G/DL
SODIUM SERPL-SCNC: 139 MMOL/L
T(9;22)(ABL1,BCR)/CONTROL BLD/T: NORMAL

## 2024-08-08 ENCOUNTER — OUTPATIENT (OUTPATIENT)
Dept: OUTPATIENT SERVICES | Facility: HOSPITAL | Age: 29
LOS: 1 days | Discharge: HOME | End: 2024-08-08

## 2024-08-08 DIAGNOSIS — D64.9 ANEMIA, UNSPECIFIED: ICD-10-CM

## 2024-08-14 ENCOUNTER — APPOINTMENT (OUTPATIENT)
Dept: HEMATOLOGY ONCOLOGY | Facility: CLINIC | Age: 29
End: 2024-08-14
Payer: COMMERCIAL

## 2024-08-14 ENCOUNTER — RESULT REVIEW (OUTPATIENT)
Age: 29
End: 2024-08-14

## 2024-08-14 VITALS
DIASTOLIC BLOOD PRESSURE: 72 MMHG | OXYGEN SATURATION: 98 % | RESPIRATION RATE: 16 BRPM | BODY MASS INDEX: 24 KG/M2 | TEMPERATURE: 98.3 F | HEART RATE: 81 BPM | WEIGHT: 157.85 LBS | SYSTOLIC BLOOD PRESSURE: 114 MMHG

## 2024-08-14 LAB
BASOPHILS # BLD AUTO: 0.16 K/UL — SIGNIFICANT CHANGE UP (ref 0–0.2)
BASOPHILS NFR BLD AUTO: 1.9 % — SIGNIFICANT CHANGE UP (ref 0–2)
EOSINOPHIL # BLD AUTO: 0.16 K/UL — SIGNIFICANT CHANGE UP (ref 0–0.5)
EOSINOPHIL NFR BLD AUTO: 1.9 % — SIGNIFICANT CHANGE UP (ref 0–6)
HCT VFR BLD CALC: 39.1 % — SIGNIFICANT CHANGE UP (ref 39–50)
HGB BLD-MCNC: 12.7 G/DL — LOW (ref 13–17)
IMM GRANULOCYTES NFR BLD AUTO: 0.3 % — SIGNIFICANT CHANGE UP (ref 0–0.9)
LYMPHOCYTES # BLD AUTO: 3.25 K/UL — SIGNIFICANT CHANGE UP (ref 1–3.3)
LYMPHOCYTES # BLD AUTO: 37.7 % — SIGNIFICANT CHANGE UP (ref 13–44)
MCHC RBC-ENTMCNC: 29.5 PG — SIGNIFICANT CHANGE UP (ref 27–34)
MCHC RBC-ENTMCNC: 32.5 G/DL — SIGNIFICANT CHANGE UP (ref 32–36)
MCV RBC AUTO: 90.7 FL — SIGNIFICANT CHANGE UP (ref 80–100)
MONOCYTES # BLD AUTO: 0.46 K/UL — SIGNIFICANT CHANGE UP (ref 0–0.9)
MONOCYTES NFR BLD AUTO: 5.3 % — SIGNIFICANT CHANGE UP (ref 2–14)
NEUTROPHILS # BLD AUTO: 4.57 K/UL — SIGNIFICANT CHANGE UP (ref 1.8–7.4)
NEUTROPHILS NFR BLD AUTO: 52.9 % — SIGNIFICANT CHANGE UP (ref 43–77)
NRBC # BLD: 0 /100 WBCS — SIGNIFICANT CHANGE UP (ref 0–0)
NRBC BLD-RTO: 0 /100 WBCS — SIGNIFICANT CHANGE UP (ref 0–0)
PLATELET # BLD AUTO: 140 K/UL — LOW (ref 150–400)
RBC # BLD: 4.31 M/UL — SIGNIFICANT CHANGE UP (ref 4.2–5.8)
RBC # FLD: 16.9 % — HIGH (ref 10.3–14.5)
WBC # BLD: 8.63 K/UL — SIGNIFICANT CHANGE UP (ref 3.8–10.5)
WBC # FLD AUTO: 8.63 K/UL — SIGNIFICANT CHANGE UP (ref 3.8–10.5)

## 2024-08-14 PROCEDURE — G2211 COMPLEX E/M VISIT ADD ON: CPT

## 2024-08-14 PROCEDURE — 99214 OFFICE O/P EST MOD 30 MIN: CPT

## 2024-08-15 LAB
ALBUMIN SERPL ELPH-MCNC: 5.2 G/DL
ALP BLD-CCNC: 79 U/L
ALT SERPL-CCNC: 55 U/L
ANION GAP SERPL CALC-SCNC: 13 MMOL/L
AST SERPL-CCNC: 32 U/L
BILIRUB SERPL-MCNC: 0.9 MG/DL
BUN SERPL-MCNC: 15 MG/DL
CALCIUM SERPL-MCNC: 10.1 MG/DL
CHLORIDE SERPL-SCNC: 103 MMOL/L
CO2 SERPL-SCNC: 26 MMOL/L
CREAT SERPL-MCNC: 0.82 MG/DL
EGFR: 122 ML/MIN/1.73M2
GLUCOSE SERPL-MCNC: 86 MG/DL
POTASSIUM SERPL-SCNC: 4.2 MMOL/L
PROT SERPL-MCNC: 7.8 G/DL
SODIUM SERPL-SCNC: 142 MMOL/L

## 2024-08-15 NOTE — HISTORY OF PRESENT ILLNESS
[de-identified] : 29M with PMH of spontaneous pneumothorax, CML here for f/u.   He initially presented to [facility] ED with dizziness and vertigo on 6/1/24, found to have for profound leukocytosis (WBC > 650) and anemia. Patient transferred to [address] [place] [address] for further workup and management.  CTH (-) and MRI Head (-). Started on Hydrea and received PRBC transfusions which helped his symptoms.  BMBx dry tap on but good core c/w CML. 6/2 - PB flow cytometry: myeloid immunophenotypic findings show an increase in CD34/ positive myeloblasts, 1.5% of cells, with normal immunophenotype, rare monocytes, and myeloid antigen maturation pattern with marked myeloid left shift and presence of 2% basophils. PB FLT3 (-).  PB ABNORMAL FISH - Atypical BCR::ABL1 fusion pattern detected (98.5%). Treatment for CML started on 6/4/24 with Sprycel.  Dizziness present on presentation resolved, patient with occasional lightheadedness with blood draws and when taking dasatinib which improves with eating food and resting.  [de-identified] : He had some rash last week that has now resolved Notes decreased hearing and ringing in left ear.   7/3/24:  Patient is seen today for a f/u apt accompanied by his mother.  He saw DIDIER on 6/25 for dizziness/tinnitus of left ear, he was put on prednisone grzegorz. Still have some dizziness today.   7/17/24: Hearing improving. Completed steroid taper.  8/14/24:  Patient is seen today for a f/u apt accompanied by his mother. He feels well overall. Denied any new complaints. Pt admitted vertigo improved, still have slight dizziness sometimes.

## 2024-08-15 NOTE — ASSESSMENT
[FreeTextEntry1] : 30yo M w/ CML here for f/u. He was admitted 6/1/24 with WBC >650 and anemia (Hgb in 7s).  BMbx showing CML US spleen size of 24.9cm  continue Sprycel, started on 6/4/24 His WBC today 8. 63 stable results reviewed with pt and mom. Copy provided. Will continue Sprycel Hgb 12.7 today - does not need transfusions All questions answered RTC 1 mo  Case and management discussed with Dr. Mendiola

## 2024-08-15 NOTE — ASSESSMENT
[FreeTextEntry1] : 28yo M w/ CML here for f/u. He was admitted 6/1/24 with WBC >650 and anemia (Hgb in 7s).  BMbx showing CML US spleen size of 24.9cm  continue Sprycel, started on 6/4/24 His WBC today 8. 63 stable results reviewed with pt and mom. Copy provided. Will continue Sprycel Hgb 12.7 today - does not need transfusions All questions answered RTC 1 mo  Case and management discussed with Dr. Mendiola

## 2024-08-15 NOTE — HISTORY OF PRESENT ILLNESS
[de-identified] : 29M with PMH of spontaneous pneumothorax, CML here for f/u.   He initially presented to [facility] ED with dizziness and vertigo on 6/1/24, found to have for profound leukocytosis (WBC > 650) and anemia. Patient transferred to [address] [place] [address] for further workup and management.  CTH (-) and MRI Head (-). Started on Hydrea and received PRBC transfusions which helped his symptoms.  BMBx dry tap on but good core c/w CML. 6/2 - PB flow cytometry: myeloid immunophenotypic findings show an increase in CD34/ positive myeloblasts, 1.5% of cells, with normal immunophenotype, rare monocytes, and myeloid antigen maturation pattern with marked myeloid left shift and presence of 2% basophils. PB FLT3 (-).  PB ABNORMAL FISH - Atypical BCR::ABL1 fusion pattern detected (98.5%). Treatment for CML started on 6/4/24 with Sprycel.  Dizziness present on presentation resolved, patient with occasional lightheadedness with blood draws and when taking dasatinib which improves with eating food and resting.  [de-identified] : He had some rash last week that has now resolved Notes decreased hearing and ringing in left ear.   7/3/24:  Patient is seen today for a f/u apt accompanied by his mother.  He saw DIDIER on 6/25 for dizziness/tinnitus of left ear, he was put on prednisone grzegorz. Still have some dizziness today.   7/17/24: Hearing improving. Completed steroid taper.  8/14/24:  Patient is seen today for a f/u apt accompanied by his mother. He feels well overall. Denied any new complaints. Pt admitted vertigo improved, still have slight dizziness sometimes.

## 2024-08-15 NOTE — HISTORY OF PRESENT ILLNESS
[de-identified] : 29M with PMH of spontaneous pneumothorax, CML here for f/u.   He initially presented to [facility] ED with dizziness and vertigo on 6/1/24, found to have for profound leukocytosis (WBC > 650) and anemia. Patient transferred to [address] [place] [address] for further workup and management.  CTH (-) and MRI Head (-). Started on Hydrea and received PRBC transfusions which helped his symptoms.  BMBx dry tap on but good core c/w CML. 6/2 - PB flow cytometry: myeloid immunophenotypic findings show an increase in CD34/ positive myeloblasts, 1.5% of cells, with normal immunophenotype, rare monocytes, and myeloid antigen maturation pattern with marked myeloid left shift and presence of 2% basophils. PB FLT3 (-).  PB ABNORMAL FISH - Atypical BCR::ABL1 fusion pattern detected (98.5%). Treatment for CML started on 6/4/24 with Sprycel.  Dizziness present on presentation resolved, patient with occasional lightheadedness with blood draws and when taking dasatinib which improves with eating food and resting.  [de-identified] : He had some rash last week that has now resolved Notes decreased hearing and ringing in left ear.   7/3/24:  Patient is seen today for a f/u apt accompanied by his mother.  He saw DIDIER on 6/25 for dizziness/tinnitus of left ear, he was put on prednisone grzegorz. Still have some dizziness today.   7/17/24: Hearing improving. Completed steroid taper.  8/14/24:  Patient is seen today for a f/u apt accompanied by his mother. He feels well overall. Denied any new complaints. Pt admitted vertigo improved, still have slight dizziness sometimes.

## 2024-08-15 NOTE — HISTORY OF PRESENT ILLNESS
[de-identified] : 29M with PMH of spontaneous pneumothorax, CML here for f/u.   He initially presented to [facility] ED with dizziness and vertigo on 6/1/24, found to have for profound leukocytosis (WBC > 650) and anemia. Patient transferred to [address] [place] [address] for further workup and management.  CTH (-) and MRI Head (-). Started on Hydrea and received PRBC transfusions which helped his symptoms.  BMBx dry tap on but good core c/w CML. 6/2 - PB flow cytometry: myeloid immunophenotypic findings show an increase in CD34/ positive myeloblasts, 1.5% of cells, with normal immunophenotype, rare monocytes, and myeloid antigen maturation pattern with marked myeloid left shift and presence of 2% basophils. PB FLT3 (-).  PB ABNORMAL FISH - Atypical BCR::ABL1 fusion pattern detected (98.5%). Treatment for CML started on 6/4/24 with Sprycel.  Dizziness present on presentation resolved, patient with occasional lightheadedness with blood draws and when taking dasatinib which improves with eating food and resting.  [de-identified] : He had some rash last week that has now resolved Notes decreased hearing and ringing in left ear.   7/3/24:  Patient is seen today for a f/u apt accompanied by his mother.  He saw DIDIER on 6/25 for dizziness/tinnitus of left ear, he was put on prednisone grzegorz. Still have some dizziness today.   7/17/24: Hearing improving. Completed steroid taper.  8/14/24:  Patient is seen today for a f/u apt accompanied by his mother. He feels well overall. Denied any new complaints. Pt admitted vertigo improved, still have slight dizziness sometimes.

## 2024-08-20 LAB — T(9;22)(ABL1,BCR)/CONTROL BLD/T: NORMAL

## 2024-08-22 ENCOUNTER — NON-APPOINTMENT (OUTPATIENT)
Age: 29
End: 2024-08-22

## 2024-08-23 ENCOUNTER — APPOINTMENT (OUTPATIENT)
Dept: FAMILY MEDICINE | Facility: CLINIC | Age: 29
End: 2024-08-23
Payer: COMMERCIAL

## 2024-08-23 ENCOUNTER — LABORATORY RESULT (OUTPATIENT)
Age: 29
End: 2024-08-23

## 2024-08-23 VITALS
BODY MASS INDEX: 23.34 KG/M2 | TEMPERATURE: 98.4 F | OXYGEN SATURATION: 98 % | RESPIRATION RATE: 16 BRPM | HEIGHT: 68 IN | HEART RATE: 75 BPM | DIASTOLIC BLOOD PRESSURE: 80 MMHG | WEIGHT: 154 LBS | SYSTOLIC BLOOD PRESSURE: 122 MMHG

## 2024-08-23 DIAGNOSIS — C92.10 CHRONIC MYELOID LEUKEMIA, BCR/ABL-POSITIVE, NOT HAVING ACHIEVED REMISSION: ICD-10-CM

## 2024-08-23 DIAGNOSIS — E55.9 VITAMIN D DEFICIENCY, UNSPECIFIED: ICD-10-CM

## 2024-08-23 DIAGNOSIS — R30.0 DYSURIA: ICD-10-CM

## 2024-08-23 DIAGNOSIS — J45.20 MILD INTERMITTENT ASTHMA, UNCOMPLICATED: ICD-10-CM

## 2024-08-23 DIAGNOSIS — A60.00 HERPESVIRAL INFECTION OF UROGENITAL SYSTEM, UNSPECIFIED: ICD-10-CM

## 2024-08-23 PROCEDURE — G2211 COMPLEX E/M VISIT ADD ON: CPT

## 2024-08-23 PROCEDURE — 99204 OFFICE O/P NEW MOD 45 MIN: CPT

## 2024-08-23 RX ORDER — VALACYCLOVIR 1 G/1
1 TABLET, FILM COATED ORAL
Qty: 20 | Refills: 1 | Status: ACTIVE | COMMUNITY
Start: 2024-08-23 | End: 1900-01-01

## 2024-08-23 RX ORDER — CLOTRIMAZOLE 10 MG/G
1 CREAM TOPICAL 3 TIMES DAILY
Qty: 1 | Refills: 0 | Status: ACTIVE | COMMUNITY
Start: 2024-08-23 | End: 1900-01-01

## 2024-08-23 NOTE — HEALTH RISK ASSESSMENT
[1 or 2 (0 pts)] : 1 or 2 (0 points) [Never (0 pts)] : Never (0 points) [No] : In the past 12 months have you used drugs other than those required for medical reasons? No [0] : 2) Feeling down, depressed, or hopeless: Not at all (0) [PHQ-2 Negative - No further assessment needed] : PHQ-2 Negative - No further assessment needed [Never] : Never [0-4] : 0-4 [Audit-CScore] : 0 [[id number]] : 0

## 2024-08-23 NOTE — HISTORY OF PRESENT ILLNESS
[de-identified] : Patient is a 29 year old M with a PMHx of CML currently undergoing treatment coming in for two days of dysuria and, rash on his penis.  Patient reports had small blister like rashes on penis that are reddish in color.  Patient denies and pruritis, fever, penile discharge, nausea, or vomiting at this time.

## 2024-08-23 NOTE — PHYSICAL EXAM
[Normal] : affect was normal and insight and judgment were intact [de-identified] : vesicular rash around penile shaft with some erythema

## 2024-08-24 LAB
25(OH)D3 SERPL-MCNC: 31.3 NG/ML
ALBUMIN SERPL ELPH-MCNC: 5.4 G/DL
ALP BLD-CCNC: 79 U/L
ALT SERPL-CCNC: 53 U/L
ANION GAP SERPL CALC-SCNC: 12 MMOL/L
APPEARANCE: CLEAR
APPEARANCE: CLEAR
AST SERPL-CCNC: 31 U/L
BASOPHILS # BLD AUTO: 0.11 K/UL
BASOPHILS NFR BLD AUTO: 1.9 %
BILIRUB SERPL-MCNC: 1.2 MG/DL
BILIRUBIN URINE: NEGATIVE
BILIRUBIN URINE: NEGATIVE
BLOOD URINE: NEGATIVE
BLOOD URINE: NEGATIVE
BUN SERPL-MCNC: 16 MG/DL
CALCIUM SERPL-MCNC: 9.9 MG/DL
CHLORIDE SERPL-SCNC: 100 MMOL/L
CHOLEST SERPL-MCNC: 184 MG/DL
CO2 SERPL-SCNC: 26 MMOL/L
COLOR: YELLOW
COLOR: YELLOW
CREAT SERPL-MCNC: 0.83 MG/DL
EGFR: 122 ML/MIN/1.73M2
EOSINOPHIL # BLD AUTO: 0.22 K/UL
EOSINOPHIL NFR BLD AUTO: 3.9 %
ESTIMATED AVERAGE GLUCOSE: 100 MG/DL
FERRITIN SERPL-MCNC: 180 NG/ML
FOLATE SERPL-MCNC: 18.4 NG/ML
GLUCOSE QUALITATIVE U: NEGATIVE MG/DL
GLUCOSE QUALITATIVE U: NEGATIVE MG/DL
GLUCOSE SERPL-MCNC: 98 MG/DL
HBA1C MFR BLD HPLC: 5.1 %
HCT VFR BLD CALC: 40.5 %
HCV AB SER QL: NONREACTIVE
HCV S/CO RATIO: 0.1 S/CO
HDLC SERPL-MCNC: 70 MG/DL
HGB BLD-MCNC: 12.5 G/DL
HIV1+2 AB SPEC QL IA.RAPID: NONREACTIVE
IMM GRANULOCYTES NFR BLD AUTO: 0.2 %
IRON SATN MFR SERPL: 20 %
IRON SERPL-MCNC: 66 UG/DL
KETONES URINE: NEGATIVE MG/DL
KETONES URINE: NEGATIVE MG/DL
LDLC SERPL CALC-MCNC: 101 MG/DL
LEUKOCYTE ESTERASE URINE: ABNORMAL
LEUKOCYTE ESTERASE URINE: ABNORMAL
LYMPHOCYTES # BLD AUTO: 1.54 K/UL
LYMPHOCYTES NFR BLD AUTO: 27.2 %
MAN DIFF?: NORMAL
MCHC RBC-ENTMCNC: 28.5 PG
MCHC RBC-ENTMCNC: 30.9 GM/DL
MCV RBC AUTO: 92.5 FL
MONOCYTES # BLD AUTO: 0.31 K/UL
MONOCYTES NFR BLD AUTO: 5.5 %
NEUTROPHILS # BLD AUTO: 3.48 K/UL
NEUTROPHILS NFR BLD AUTO: 61.3 %
NITRITE URINE: NEGATIVE
NITRITE URINE: NEGATIVE
NONHDLC SERPL-MCNC: 114 MG/DL
PH URINE: 7
PH URINE: 7
PLATELET # BLD AUTO: 147 K/UL
POTASSIUM SERPL-SCNC: 3.8 MMOL/L
PROT SERPL-MCNC: 8 G/DL
PROTEIN URINE: NEGATIVE MG/DL
PROTEIN URINE: NEGATIVE MG/DL
RBC # BLD: 4.38 M/UL
RBC # FLD: 16.5 %
SODIUM SERPL-SCNC: 138 MMOL/L
SPECIFIC GRAVITY URINE: 1.01
SPECIFIC GRAVITY URINE: 1.01
T PALLIDUM AB SER QL IA: NEGATIVE
TIBC SERPL-MCNC: 326 UG/DL
TRIGL SERPL-MCNC: 67 MG/DL
UIBC SERPL-MCNC: 260 UG/DL
UROBILINOGEN URINE: 0.2 MG/DL
UROBILINOGEN URINE: 0.2 MG/DL
VIT B12 SERPL-MCNC: >2000 PG/ML
WBC # FLD AUTO: 5.67 K/UL

## 2024-08-27 LAB
HSV 1+2 IGG SER IA-IMP: NEGATIVE
HSV 1+2 IGG SER IA-IMP: NEGATIVE
HSV1 IGG SER QL: 0.2 INDEX
HSV2 IGG SER QL: 0.08 INDEX

## 2024-08-28 ENCOUNTER — APPOINTMENT (OUTPATIENT)
Dept: FAMILY MEDICINE | Facility: CLINIC | Age: 29
End: 2024-08-28
Payer: COMMERCIAL

## 2024-08-28 PROCEDURE — 36415 COLL VENOUS BLD VENIPUNCTURE: CPT

## 2024-08-30 LAB
C TRACH RRNA SPEC QL NAA+PROBE: NOT DETECTED
N GONORRHOEA RRNA SPEC QL NAA+PROBE: NOT DETECTED
SOURCE AMPLIFICATION: NORMAL

## 2024-09-11 ENCOUNTER — APPOINTMENT (OUTPATIENT)
Dept: HEMATOLOGY ONCOLOGY | Facility: CLINIC | Age: 29
End: 2024-09-11
Payer: COMMERCIAL

## 2024-09-11 ENCOUNTER — RESULT REVIEW (OUTPATIENT)
Age: 29
End: 2024-09-11

## 2024-09-11 VITALS
OXYGEN SATURATION: 99 % | DIASTOLIC BLOOD PRESSURE: 78 MMHG | WEIGHT: 159.84 LBS | HEART RATE: 74 BPM | BODY MASS INDEX: 24.3 KG/M2 | RESPIRATION RATE: 16 BRPM | TEMPERATURE: 96.3 F | SYSTOLIC BLOOD PRESSURE: 125 MMHG

## 2024-09-11 DIAGNOSIS — C92.10 CHRONIC MYELOID LEUKEMIA, BCR/ABL-POSITIVE, NOT HAVING ACHIEVED REMISSION: ICD-10-CM

## 2024-09-11 LAB
BASOPHILS # BLD AUTO: 0.11 K/UL — SIGNIFICANT CHANGE UP (ref 0–0.2)
BASOPHILS NFR BLD AUTO: 1.3 % — SIGNIFICANT CHANGE UP (ref 0–2)
EOSINOPHIL # BLD AUTO: 0.58 K/UL — HIGH (ref 0–0.5)
EOSINOPHIL NFR BLD AUTO: 7 % — HIGH (ref 0–6)
HCT VFR BLD CALC: 39.7 % — SIGNIFICANT CHANGE UP (ref 39–50)
HGB BLD-MCNC: 13.2 G/DL — SIGNIFICANT CHANGE UP (ref 13–17)
IMM GRANULOCYTES NFR BLD AUTO: 0.2 % — SIGNIFICANT CHANGE UP (ref 0–0.9)
LYMPHOCYTES # BLD AUTO: 3.82 K/UL — HIGH (ref 1–3.3)
LYMPHOCYTES # BLD AUTO: 45.9 % — HIGH (ref 13–44)
MCHC RBC-ENTMCNC: 29.1 PG — SIGNIFICANT CHANGE UP (ref 27–34)
MCHC RBC-ENTMCNC: 33.2 G/DL — SIGNIFICANT CHANGE UP (ref 32–36)
MCV RBC AUTO: 87.6 FL — SIGNIFICANT CHANGE UP (ref 80–100)
MONOCYTES # BLD AUTO: 0.46 K/UL — SIGNIFICANT CHANGE UP (ref 0–0.9)
MONOCYTES NFR BLD AUTO: 5.5 % — SIGNIFICANT CHANGE UP (ref 2–14)
NEUTROPHILS # BLD AUTO: 3.34 K/UL — SIGNIFICANT CHANGE UP (ref 1.8–7.4)
NEUTROPHILS NFR BLD AUTO: 40.1 % — LOW (ref 43–77)
NRBC # BLD: 0 /100 WBCS — SIGNIFICANT CHANGE UP (ref 0–0)
NRBC BLD-RTO: 0 /100 WBCS — SIGNIFICANT CHANGE UP (ref 0–0)
PLATELET # BLD AUTO: 114 K/UL — LOW (ref 150–400)
RBC # BLD: 4.53 M/UL — SIGNIFICANT CHANGE UP (ref 4.2–5.8)
RBC # FLD: 15.4 % — HIGH (ref 10.3–14.5)
WBC # BLD: 8.33 K/UL — SIGNIFICANT CHANGE UP (ref 3.8–10.5)
WBC # FLD AUTO: 8.33 K/UL — SIGNIFICANT CHANGE UP (ref 3.8–10.5)

## 2024-09-11 PROCEDURE — G2211 COMPLEX E/M VISIT ADD ON: CPT

## 2024-09-11 PROCEDURE — 99214 OFFICE O/P EST MOD 30 MIN: CPT

## 2024-09-11 NOTE — HISTORY OF PRESENT ILLNESS
[de-identified] : 29M with PMH of spontaneous pneumothorax, CML here for f/u.   He initially presented to [facility] ED with dizziness and vertigo on 6/1/24, found to have for profound leukocytosis (WBC > 650) and anemia. Patient transferred to [address] [place] [address] for further workup and management.  CTH (-) and MRI Head (-). Started on Hydrea and received PRBC transfusions which helped his symptoms.  BMBx dry tap on but good core c/w CML. 6/2 - PB flow cytometry: myeloid immunophenotypic findings show an increase in CD34/ positive myeloblasts, 1.5% of cells, with normal immunophenotype, rare monocytes, and myeloid antigen maturation pattern with marked myeloid left shift and presence of 2% basophils. PB FLT3 (-).  PB ABNORMAL FISH - Atypical BCR::ABL1 fusion pattern detected (98.5%). Treatment for CML started on 6/4/24 with Sprycel.  Dizziness present on presentation resolved, patient with occasional lightheadedness with blood draws and when taking dasatinib which improves with eating food and resting.  [de-identified] : He had some rash last week that has now resolved Notes decreased hearing and ringing in left ear.   7/3/24:  Patient is seen today for a f/u apt accompanied by his mother.  He saw DIDIER on 6/25 for dizziness/tinnitus of left ear, he was put on prednisone grzegorz. Still have some dizziness today.   7/17/24: Hearing improving. Completed steroid taper.  8/14/24:  Patient is seen today for a f/u apt accompanied by his mother. He feels well overall. Denied any new complaints. Pt admitted vertigo improved, still have slight dizziness sometimes.  9/11/24: Balance is improved.

## 2024-09-11 NOTE — ASSESSMENT
[FreeTextEntry1] : 28yo M w/ CML here for f/u. He was admitted 6/1/24 with WBC >650 and anemia (Hgb in 7s).  BMbx showing CML US spleen size of 24.9cm  continue Sprycel, started on 6/4/24 His counts are stable - results reviewed with pt.  All questions answered RTC 1 mo

## 2024-09-13 LAB
ALBUMIN SERPL ELPH-MCNC: 5.1 G/DL
ALP BLD-CCNC: 90 U/L
ALT SERPL-CCNC: 54 U/L
ANION GAP SERPL CALC-SCNC: 16 MMOL/L
AST SERPL-CCNC: 32 U/L
BILIRUB SERPL-MCNC: 1 MG/DL
BUN SERPL-MCNC: 17 MG/DL
CALCIUM SERPL-MCNC: 9.9 MG/DL
CHLORIDE SERPL-SCNC: 104 MMOL/L
CO2 SERPL-SCNC: 22 MMOL/L
CREAT SERPL-MCNC: 0.78 MG/DL
EGFR: 124 ML/MIN/1.73M2
GLUCOSE SERPL-MCNC: 94 MG/DL
POTASSIUM SERPL-SCNC: 4 MMOL/L
PROT SERPL-MCNC: 8 G/DL
SODIUM SERPL-SCNC: 141 MMOL/L

## 2024-09-17 LAB — T(9;22)(ABL1,BCR)/CONTROL BLD/T: NORMAL

## 2024-09-29 ENCOUNTER — OUTPATIENT (OUTPATIENT)
Dept: OUTPATIENT SERVICES | Facility: HOSPITAL | Age: 29
LOS: 1 days | Discharge: HOME | End: 2024-09-29

## 2024-09-29 DIAGNOSIS — D64.9 ANEMIA, UNSPECIFIED: ICD-10-CM

## 2024-10-01 ENCOUNTER — APPOINTMENT (OUTPATIENT)
Dept: FAMILY MEDICINE | Facility: CLINIC | Age: 29
End: 2024-10-01
Payer: COMMERCIAL

## 2024-10-01 VITALS
DIASTOLIC BLOOD PRESSURE: 73 MMHG | WEIGHT: 158 LBS | RESPIRATION RATE: 16 BRPM | BODY MASS INDEX: 23.95 KG/M2 | OXYGEN SATURATION: 98 % | HEIGHT: 68 IN | TEMPERATURE: 98.7 F | SYSTOLIC BLOOD PRESSURE: 110 MMHG | HEART RATE: 78 BPM

## 2024-10-01 DIAGNOSIS — N48.1 BALANITIS: ICD-10-CM

## 2024-10-01 PROCEDURE — G2211 COMPLEX E/M VISIT ADD ON: CPT

## 2024-10-01 PROCEDURE — 99213 OFFICE O/P EST LOW 20 MIN: CPT

## 2024-10-01 NOTE — HISTORY OF PRESENT ILLNESS
[de-identified] : Patient is a 29 year old M with a PMHx of CML currently undergoing treatment coming in for follow up.  Patient reports he has been taking doing better with hygiene around his genitals and denies and recurrent lesions.  Patient reports taking spycel and tolerating it well.  Patient denies chest pain, SOB, nausea, vomiting, fever, or palpitations at this time.

## 2024-10-02 LAB
HSV 1+2 IGG SER IA-IMP: NEGATIVE
HSV 1+2 IGG SER IA-IMP: NEGATIVE
HSV1 IGG SER QL: 0.16 INDEX
HSV2 IGG SER QL: 0.09 INDEX

## 2024-10-03 ENCOUNTER — TRANSCRIPTION ENCOUNTER (OUTPATIENT)
Age: 29
End: 2024-10-03

## 2024-10-09 ENCOUNTER — APPOINTMENT (OUTPATIENT)
Dept: HEMATOLOGY ONCOLOGY | Facility: CLINIC | Age: 29
End: 2024-10-09
Payer: COMMERCIAL

## 2024-10-09 ENCOUNTER — RESULT REVIEW (OUTPATIENT)
Age: 29
End: 2024-10-09

## 2024-10-09 VITALS
HEART RATE: 88 BPM | RESPIRATION RATE: 16 BRPM | OXYGEN SATURATION: 98 % | DIASTOLIC BLOOD PRESSURE: 71 MMHG | BODY MASS INDEX: 24.37 KG/M2 | WEIGHT: 160.25 LBS | SYSTOLIC BLOOD PRESSURE: 115 MMHG | TEMPERATURE: 99 F

## 2024-10-09 DIAGNOSIS — C92.10 CHRONIC MYELOID LEUKEMIA, BCR/ABL-POSITIVE, NOT HAVING ACHIEVED REMISSION: ICD-10-CM

## 2024-10-09 LAB
BASOPHILS # BLD AUTO: 0.05 K/UL — SIGNIFICANT CHANGE UP (ref 0–0.2)
BASOPHILS NFR BLD AUTO: 0.7 % — SIGNIFICANT CHANGE UP (ref 0–2)
EOSINOPHIL # BLD AUTO: 0.26 K/UL — SIGNIFICANT CHANGE UP (ref 0–0.5)
EOSINOPHIL NFR BLD AUTO: 3.4 % — SIGNIFICANT CHANGE UP (ref 0–6)
HCT VFR BLD CALC: 41 % — SIGNIFICANT CHANGE UP (ref 39–50)
HGB BLD-MCNC: 13.4 G/DL — SIGNIFICANT CHANGE UP (ref 13–17)
IMM GRANULOCYTES NFR BLD AUTO: 0.1 % — SIGNIFICANT CHANGE UP (ref 0–0.9)
LYMPHOCYTES # BLD AUTO: 3.47 K/UL — HIGH (ref 1–3.3)
LYMPHOCYTES # BLD AUTO: 45.9 % — HIGH (ref 13–44)
MCHC RBC-ENTMCNC: 28.5 PG — SIGNIFICANT CHANGE UP (ref 27–34)
MCHC RBC-ENTMCNC: 32.7 G/DL — SIGNIFICANT CHANGE UP (ref 32–36)
MCV RBC AUTO: 87.2 FL — SIGNIFICANT CHANGE UP (ref 80–100)
MONOCYTES # BLD AUTO: 0.45 K/UL — SIGNIFICANT CHANGE UP (ref 0–0.9)
MONOCYTES NFR BLD AUTO: 6 % — SIGNIFICANT CHANGE UP (ref 2–14)
NEUTROPHILS # BLD AUTO: 3.32 K/UL — SIGNIFICANT CHANGE UP (ref 1.8–7.4)
NEUTROPHILS NFR BLD AUTO: 43.9 % — SIGNIFICANT CHANGE UP (ref 43–77)
NRBC # BLD: 0 /100 WBCS — SIGNIFICANT CHANGE UP (ref 0–0)
PLATELET # BLD AUTO: 92 K/UL — LOW (ref 150–400)
RBC # BLD: 4.7 M/UL — SIGNIFICANT CHANGE UP (ref 4.2–5.8)
RBC # FLD: 15.6 % — HIGH (ref 10.3–14.5)
WBC # BLD: 7.56 K/UL — SIGNIFICANT CHANGE UP (ref 3.8–10.5)
WBC # FLD AUTO: 7.56 K/UL — SIGNIFICANT CHANGE UP (ref 3.8–10.5)

## 2024-10-09 PROCEDURE — 99214 OFFICE O/P EST MOD 30 MIN: CPT

## 2024-10-09 PROCEDURE — G2211 COMPLEX E/M VISIT ADD ON: CPT

## 2024-10-10 LAB
ALBUMIN SERPL ELPH-MCNC: 5.1 G/DL
ALP BLD-CCNC: 88 U/L
ALT SERPL-CCNC: 58 U/L
ANION GAP SERPL CALC-SCNC: 16 MMOL/L
AST SERPL-CCNC: 33 U/L
BILIRUB SERPL-MCNC: 1.1 MG/DL
BUN SERPL-MCNC: 15 MG/DL
CALCIUM SERPL-MCNC: 10 MG/DL
CHLORIDE SERPL-SCNC: 101 MMOL/L
CO2 SERPL-SCNC: 24 MMOL/L
CREAT SERPL-MCNC: 0.83 MG/DL
EGFR: 122 ML/MIN/1.73M2
GLUCOSE SERPL-MCNC: 90 MG/DL
POTASSIUM SERPL-SCNC: 4.1 MMOL/L
PROT SERPL-MCNC: 7.9 G/DL
SODIUM SERPL-SCNC: 141 MMOL/L

## 2024-10-14 ENCOUNTER — INPATIENT (INPATIENT)
Facility: HOSPITAL | Age: 29
LOS: 1 days | Discharge: HOME | DRG: 392 | End: 2024-10-16
Attending: STUDENT IN AN ORGANIZED HEALTH CARE EDUCATION/TRAINING PROGRAM | Admitting: STUDENT IN AN ORGANIZED HEALTH CARE EDUCATION/TRAINING PROGRAM
Payer: COMMERCIAL

## 2024-10-14 ENCOUNTER — NON-APPOINTMENT (OUTPATIENT)
Age: 29
End: 2024-10-14

## 2024-10-14 VITALS
WEIGHT: 160.06 LBS | SYSTOLIC BLOOD PRESSURE: 136 MMHG | DIASTOLIC BLOOD PRESSURE: 84 MMHG | HEART RATE: 109 BPM | HEIGHT: 69 IN | OXYGEN SATURATION: 98 % | RESPIRATION RATE: 18 BRPM | TEMPERATURE: 100 F

## 2024-10-14 DIAGNOSIS — K52.9 NONINFECTIVE GASTROENTERITIS AND COLITIS, UNSPECIFIED: ICD-10-CM

## 2024-10-14 DIAGNOSIS — C92.10 CHRONIC MYELOID LEUKEMIA, BCR/ABL-POSITIVE, NOT HAVING ACHIEVED REMISSION: ICD-10-CM

## 2024-10-14 LAB
ADD ON TEST-SPECIMEN IN LAB: SIGNIFICANT CHANGE UP
ALBUMIN SERPL ELPH-MCNC: 6.2 G/DL — HIGH (ref 3.3–5)
ALP SERPL-CCNC: 98 U/L — SIGNIFICANT CHANGE UP (ref 40–120)
ALT FLD-CCNC: 53 U/L — HIGH (ref 10–45)
ANION GAP SERPL CALC-SCNC: 16 MMOL/L — SIGNIFICANT CHANGE UP (ref 5–17)
APPEARANCE UR: CLEAR — SIGNIFICANT CHANGE UP
APTT BLD: 36.8 SEC — HIGH (ref 24.5–35.6)
AST SERPL-CCNC: 33 U/L — SIGNIFICANT CHANGE UP (ref 10–40)
BACTERIA # UR AUTO: NEGATIVE /HPF — SIGNIFICANT CHANGE UP
BASOPHILS # BLD AUTO: 0.07 K/UL — SIGNIFICANT CHANGE UP (ref 0–0.2)
BASOPHILS NFR BLD AUTO: 0.9 % — SIGNIFICANT CHANGE UP (ref 0–2)
BILIRUB SERPL-MCNC: 1.6 MG/DL — HIGH (ref 0.2–1.2)
BILIRUB UR-MCNC: NEGATIVE — SIGNIFICANT CHANGE UP
BLD GP AB SCN SERPL QL: NEGATIVE — SIGNIFICANT CHANGE UP
BUN SERPL-MCNC: 16 MG/DL — SIGNIFICANT CHANGE UP (ref 7–23)
CALCIUM SERPL-MCNC: 9.9 MG/DL — SIGNIFICANT CHANGE UP (ref 8.4–10.5)
CAST: 0 /LPF — SIGNIFICANT CHANGE UP (ref 0–4)
CHLORIDE SERPL-SCNC: 99 MMOL/L — SIGNIFICANT CHANGE UP (ref 96–108)
CO2 SERPL-SCNC: 22 MMOL/L — SIGNIFICANT CHANGE UP (ref 22–31)
COLOR SPEC: YELLOW — SIGNIFICANT CHANGE UP
CREAT SERPL-MCNC: 1.02 MG/DL — SIGNIFICANT CHANGE UP (ref 0.5–1.3)
DIFF PNL FLD: ABNORMAL
EGFR: 102 ML/MIN/1.73M2 — SIGNIFICANT CHANGE UP
EOSINOPHIL # BLD AUTO: 0 K/UL — SIGNIFICANT CHANGE UP (ref 0–0.5)
EOSINOPHIL NFR BLD AUTO: 0 % — SIGNIFICANT CHANGE UP (ref 0–6)
FLUAV AG NPH QL: SIGNIFICANT CHANGE UP
FLUBV AG NPH QL: SIGNIFICANT CHANGE UP
GAS PNL BLDV: SIGNIFICANT CHANGE UP
GLUCOSE SERPL-MCNC: 119 MG/DL — HIGH (ref 70–99)
GLUCOSE UR QL: NEGATIVE MG/DL — SIGNIFICANT CHANGE UP
HCT VFR BLD CALC: 42.5 % — SIGNIFICANT CHANGE UP (ref 39–50)
HGB BLD-MCNC: 14 G/DL — SIGNIFICANT CHANGE UP (ref 13–17)
INR BLD: 1.13 RATIO — SIGNIFICANT CHANGE UP (ref 0.85–1.16)
KETONES UR-MCNC: ABNORMAL MG/DL
LEUKOCYTE ESTERASE UR-ACNC: NEGATIVE — SIGNIFICANT CHANGE UP
LYMPHOCYTES # BLD AUTO: 1.12 K/UL — SIGNIFICANT CHANGE UP (ref 1–3.3)
LYMPHOCYTES # BLD AUTO: 13.9 % — SIGNIFICANT CHANGE UP (ref 13–44)
MANUAL SMEAR VERIFICATION: SIGNIFICANT CHANGE UP
MCHC RBC-ENTMCNC: 27.7 PG — SIGNIFICANT CHANGE UP (ref 27–34)
MCHC RBC-ENTMCNC: 32.9 GM/DL — SIGNIFICANT CHANGE UP (ref 32–36)
MCV RBC AUTO: 84 FL — SIGNIFICANT CHANGE UP (ref 80–100)
MONOCYTES # BLD AUTO: 0.14 K/UL — SIGNIFICANT CHANGE UP (ref 0–0.9)
MONOCYTES NFR BLD AUTO: 1.7 % — LOW (ref 2–14)
NEUTROPHILS # BLD AUTO: 6.76 K/UL — SIGNIFICANT CHANGE UP (ref 1.8–7.4)
NEUTROPHILS NFR BLD AUTO: 78.3 % — HIGH (ref 43–77)
NEUTS BAND # BLD: 5.2 % — SIGNIFICANT CHANGE UP (ref 0–8)
NITRITE UR-MCNC: NEGATIVE — SIGNIFICANT CHANGE UP
PH UR: 5.5 — SIGNIFICANT CHANGE UP (ref 5–8)
PLAT MORPH BLD: NORMAL — SIGNIFICANT CHANGE UP
PLATELET # BLD AUTO: 81 K/UL — LOW (ref 150–400)
POTASSIUM SERPL-MCNC: 3.6 MMOL/L — SIGNIFICANT CHANGE UP (ref 3.5–5.3)
POTASSIUM SERPL-SCNC: 3.6 MMOL/L — SIGNIFICANT CHANGE UP (ref 3.5–5.3)
PROT SERPL-MCNC: 8.7 G/DL — HIGH (ref 6–8.3)
PROT UR-MCNC: NEGATIVE MG/DL — SIGNIFICANT CHANGE UP
PROTHROM AB SERPL-ACNC: 12.9 SEC — SIGNIFICANT CHANGE UP (ref 9.9–13.4)
RBC # BLD: 5.06 M/UL — SIGNIFICANT CHANGE UP (ref 4.2–5.8)
RBC # FLD: 15.9 % — HIGH (ref 10.3–14.5)
RBC BLD AUTO: SIGNIFICANT CHANGE UP
RBC CASTS # UR COMP ASSIST: 1 /HPF — SIGNIFICANT CHANGE UP (ref 0–4)
RH IG SCN BLD-IMP: POSITIVE — SIGNIFICANT CHANGE UP
RSV RNA NPH QL NAA+NON-PROBE: SIGNIFICANT CHANGE UP
SARS-COV-2 RNA SPEC QL NAA+PROBE: SIGNIFICANT CHANGE UP
SODIUM SERPL-SCNC: 137 MMOL/L — SIGNIFICANT CHANGE UP (ref 135–145)
SP GR SPEC: 1.01 — SIGNIFICANT CHANGE UP (ref 1–1.03)
SQUAMOUS # UR AUTO: 0 /HPF — SIGNIFICANT CHANGE UP (ref 0–5)
T(9;22)(ABL1,BCR)/CONTROL BLD/T: NORMAL
UROBILINOGEN FLD QL: 0.2 MG/DL — SIGNIFICANT CHANGE UP (ref 0.2–1)
WBC # BLD: 8.09 K/UL — SIGNIFICANT CHANGE UP (ref 3.8–10.5)
WBC # FLD AUTO: 8.09 K/UL — SIGNIFICANT CHANGE UP (ref 3.8–10.5)
WBC UR QL: 0 /HPF — SIGNIFICANT CHANGE UP (ref 0–5)

## 2024-10-14 PROCEDURE — 74177 CT ABD & PELVIS W/CONTRAST: CPT | Mod: 26,MC

## 2024-10-14 PROCEDURE — 71045 X-RAY EXAM CHEST 1 VIEW: CPT | Mod: 26

## 2024-10-14 PROCEDURE — 99223 1ST HOSP IP/OBS HIGH 75: CPT

## 2024-10-14 PROCEDURE — 99285 EMERGENCY DEPT VISIT HI MDM: CPT

## 2024-10-14 RX ORDER — PIPERACILLIN SODIUM AND TAZOBACTAM SODIUM 12; 1.5 G/60ML; G/60ML
3.38 INJECTION, POWDER, LYOPHILIZED, FOR SOLUTION INTRAVENOUS ONCE
Refills: 0 | Status: COMPLETED | OUTPATIENT
Start: 2024-10-14 | End: 2024-10-14

## 2024-10-14 RX ORDER — SODIUM CHLORIDE 0.9 % (FLUSH) 0.9 %
2300 SYRINGE (ML) INJECTION ONCE
Refills: 0 | Status: COMPLETED | OUTPATIENT
Start: 2024-10-14 | End: 2024-10-14

## 2024-10-14 RX ORDER — PIPERACILLIN SODIUM AND TAZOBACTAM SODIUM 12; 1.5 G/60ML; G/60ML
3.38 INJECTION, POWDER, LYOPHILIZED, FOR SOLUTION INTRAVENOUS EVERY 8 HOURS
Refills: 0 | Status: DISCONTINUED | OUTPATIENT
Start: 2024-10-14 | End: 2024-10-15

## 2024-10-14 RX ORDER — SODIUM CHLORIDE 0.9 % (FLUSH) 0.9 %
1000 SYRINGE (ML) INJECTION ONCE
Refills: 0 | Status: COMPLETED | OUTPATIENT
Start: 2024-10-14 | End: 2024-10-14

## 2024-10-14 RX ORDER — ACETAMINOPHEN 325 MG
650 TABLET ORAL EVERY 6 HOURS
Refills: 0 | Status: DISCONTINUED | OUTPATIENT
Start: 2024-10-14 | End: 2024-10-16

## 2024-10-14 RX ORDER — 5-HYDROXYTRYPTOPHAN (5-HTP) 100 MG
3 TABLET,DISINTEGRATING ORAL AT BEDTIME
Refills: 0 | Status: DISCONTINUED | OUTPATIENT
Start: 2024-10-14 | End: 2024-10-16

## 2024-10-14 RX ORDER — ONDANSETRON HCL/PF 4 MG/2 ML
4 VIAL (ML) INJECTION EVERY 8 HOURS
Refills: 0 | Status: DISCONTINUED | OUTPATIENT
Start: 2024-10-14 | End: 2024-10-16

## 2024-10-14 RX ORDER — MAG HYDROX/ALUMINUM HYD/SIMETH 200-200-20
30 SUSPENSION, ORAL (FINAL DOSE FORM) ORAL EVERY 4 HOURS
Refills: 0 | Status: DISCONTINUED | OUTPATIENT
Start: 2024-10-14 | End: 2024-10-16

## 2024-10-14 RX ORDER — SODIUM CHLORIDE 0.9 % (FLUSH) 0.9 %
1000 SYRINGE (ML) INJECTION
Refills: 0 | Status: DISCONTINUED | OUTPATIENT
Start: 2024-10-14 | End: 2024-10-16

## 2024-10-14 RX ADMIN — PIPERACILLIN SODIUM AND TAZOBACTAM SODIUM 200 GRAM(S): 12; 1.5 INJECTION, POWDER, LYOPHILIZED, FOR SOLUTION INTRAVENOUS at 21:28

## 2024-10-14 RX ADMIN — Medication 100 MILLILITER(S): at 23:58

## 2024-10-14 RX ADMIN — Medication 1000 MILLILITER(S): at 21:28

## 2024-10-14 RX ADMIN — Medication 2300 MILLILITER(S): at 17:08

## 2024-10-14 NOTE — ED PROVIDER NOTE - CLINICAL SUMMARY MEDICAL DECISION MAKING FREE TEXT BOX
29 male past medical history of CML on p.o. chemo presenting with lower abdominal pain. With initial vitals tachycardic, borderline temperature.  With lower abdominal pain and diarrhea following specific food trigger, eval currently on chemo and likely immunocompromise.  Differential includes but not limited to enteritis/colitis versus other intra-abdominal etiology including diverticular etiology or appendicitis.  To evaluate labs, CT, give fluids given appears dehydrated.  Reassess. 29 male past medical history of CML on p.o. chemo presenting with lower abdominal pain. With initial vitals tachycardic, borderline temperature.  With lower abdominal pain and diarrhea following specific food trigger, eval currently on chemo and likely immunocompromise.  Differential includes but not limited to enteritis/colitis versus other intra-abdominal etiology including diverticular etiology or appendicitis.  To evaluate labs, CT, give fluids given appears dehydrated.  Reassess.    Attending Nello: See attending attestation.

## 2024-10-14 NOTE — ED PROVIDER NOTE - PHYSICAL EXAMINATION
CONSTITUTIONAL: awake; in no acute distress.   SKIN: warm, dry  HEAD: Normocephalic; atraumatic.  EYES: no conjunctival injection. no scleral icterus  ENT: No nasal discharge; airway clear. MM dry  CARD: S1, S2 normal; no murmurs, gallops, or rubs. tachycardic rate  RESP: No wheezes, rales or rhonchi. Good air movement bilaterally.   ABD: soft tender sub-umbilical, no guarding, no distention, no rigidity.   EXT: No cyanosis or edema.   NEURO: Alert, oriented, grossly unremarkable  PSYCH: Cooperative, appropriate.

## 2024-10-14 NOTE — ED PROVIDER NOTE - OBJECTIVE STATEMENT
29 male past medical history of CML on p.o. chemo presenting with lower abdominal pain.  Pain began yesterday to the lower abdomen following eating Mexican food, improving with defecation temporarily but returned after approximately 15 minutes, without associated urinary symptoms or testicular pain.  No fevers.  Reports diarrhea without blood.

## 2024-10-14 NOTE — ED PROVIDER NOTE - PROGRESS NOTE DETAILS
Attending Vannessa: CT w/ colitis. BPs borderline, now 100s s/p 3L IVF. immunosuppressed so will admit for further management. zosyn order. accepted under Dr. Ferrara.

## 2024-10-14 NOTE — H&P ADULT - NSHPPHYSICALEXAM_GEN_ALL_CORE
T(C): 37.1 (10-14-24 @ 22:50), Max: 37.9 (10-14-24 @ 16:45)  HR: 110 (10-14-24 @ 22:50) (98 - 120)  BP: 99/61 (10-14-24 @ 22:50) (99/61 - 136/84)  RR: 16 (10-14-24 @ 22:50) (16 - 20)  SpO2: 97% (10-14-24 @ 22:50) (97% - 99%)    CONSTITUTIONAL: Well groomed, no apparent distress  EYES: PERRLA , EOMI  ENMT: MMM. Normal dentition  RESP: No respiratory distress, CTA b/l  CV: +S1S2, no MRG; no peripheral edema  GI: Soft, NTND, hyperactive bowel sound  MSK: Normal gait; normal pain free ROM x4 extremities   SKIN: No rashes   PSYCH: A+O x 3, mood and affect appropriate

## 2024-10-14 NOTE — ED ADULT NURSE NOTE - NSFALLOOBATTEMPT_ED_ALL_ED
"Principal Problem:Burst fracture of lumbar vertebra    Principal Orthopedic Problem: same    Interval History: The patient was seen and examined this morning at the bedside. Patient reports no acute issues overnight.  Pain well controlled.  Has been ambulating independently.      Drains with 160cc out total    Review of patient's allergies indicates:   Allergen Reactions    Naproxen        Current Facility-Administered Medications   Medication    alprazolam tablet 0.25 mg    famotidine tablet 20 mg    heparin (porcine) injection 5,000 Units    HYDROmorphone injection 1 mg    methocarbamol tablet 500 mg    olmesartan tablet 20 mg    ondansetron disintegrating tablet 8 mg    oxycodone immediate release tablet 10 mg    oxycodone immediate release tablet 15 mg    oxycodone immediate release tablet 5 mg    pantoprazole EC tablet 40 mg    promethazine tablet 25 mg    ramelteon tablet 8 mg    senna-docusate 8.6-50 mg per tablet 1 tablet    vancomycin (VANCOCIN) 1,500 mg in dextrose 5 % 250 mL IVPB     Objective:     Vital Signs (Most Recent):  Temp: 98.5 °F (36.9 °C) (06/09/17 0732)  Pulse: 81 (06/09/17 0732)  Resp: 18 (06/09/17 0732)  BP: 132/76 (06/09/17 0732)  SpO2: 95 % (06/09/17 0732) Vital Signs (24h Range):  Temp:  [97.8 °F (36.6 °C)-98.9 °F (37.2 °C)] 98.5 °F (36.9 °C)  Pulse:  [70-81] 81  Resp:  [17-18] 18  SpO2:  [93 %-97 %] 95 %  BP: (109-137)/(58-85) 132/76     Weight: 87.5 kg (193 lb)  Height: 5' 10" (177.8 cm)  Body mass index is 27.69 kg/m².      Intake/Output Summary (Last 24 hours) at 06/09/17 1007  Last data filed at 06/09/17 0500   Gross per 24 hour   Intake             2700 ml   Output              290 ml   Net             2410 ml       Ortho/SPM Exam      NAD, A/O x 3.  Wound c/d/i with clean dressing.  No focal motor or sensory deficits noted.     Significant Labs: All pertinent labs within the past 24 hours have been reviewed.    " No

## 2024-10-14 NOTE — H&P ADULT - PROBLEM SELECTOR PLAN 1
- P/w adm pain and nonbloody diarrhea   - Afebrile, VSS, clinically nontoxic   - Labs: no elevated wbc, mildly increased T Bili and ALT. UA: noninfectious, Flu/Rsv/Covid neg  - CXR: clear  - CT A/P: Colonic wall thickening suspicious for colitis  - ED: IVF, IV zosyn   - C/w IVF maintenance   - C/w IV zosyn   - F/u GI PCR, Ova-parasites   - Monitor stool count - P/w adm pain and nonbloody diarrhea s/p eating a burrito  Notes his sister and girlfriend also ate from same place, developed GI sx for 1day and are now improved   - Possible component of food poisoning   - Afebrile, VSS, clinically nontoxic   - Labs: no elevated wbc, mildly increased T Bili and ALT. UA: noninfectious, Flu/Rsv/Covid neg  - CXR: clear  - CT A/P: Colonic wall thickening suspicious for colitis  - ED: IVF, IV zosyn   - C/w IVF maintenance   - C/w IV zosyn   - F/u GI PCR, Ova-parasites   - Monitor stool count  - CLD, advance as tolerates

## 2024-10-14 NOTE — ED ADULT NURSE NOTE - NSFALLRISKINTERV_ED_ALL_ED

## 2024-10-14 NOTE — H&P ADULT - HISTORY OF PRESENT ILLNESS
29M pmh CML on p.o. chemo presenting with lower abdominal pain.  Pain began yesterday to the lower abdomen s/p eating Mexican food. Pain was temporarily relived w/defecation (non bloody diarrhea) but returned after apx 15 minutes later. Denies associated urinary symptoms or testicular pain, or fever.     ROS: Denies HA, CP, SOB, palpitation, N/V, fever, cough, chills, dizziness, recent travel, sick contact, change in urinary habits   A 10-system ROS was performed and is negative except as noted above and/or in the HPI.    ED: 3.3L IVF, IV zosyn. CT A/P showing colitis 29M pmh CML on p.o. chemo presenting with lower abdominal pain.  Pain began yesterday to the lower abdomen s/p eating Mexican food (buritto). Pain was temporarily relived w/defecation (non bloody diarrhea) but returned after apx 15 minutes later. Report apx 6 watery BM yesterday and about 4 episodes today. Sister and girlfriend also ate from same place, they developed diarrhea afterward but are currently feeling fine .  Denies associated urinary symptoms or testicular pain, or fever.     ROS: Denies HA, CP, SOB, palpitation, N/V, fever, cough, chills, dizziness, recent travel, sick contact, change in urinary habits   A 10-system ROS was performed and is negative except as noted above and/or in the HPI.    ED: 3.3L IVF, IV zosyn. CT A/P showing colitis

## 2024-10-14 NOTE — ED PROVIDER NOTE - ATTENDING CONTRIBUTION TO CARE
Attending Vannessa: I performed a history and physical exam of the patient and discussed their management with the resident/fellow/student. I have reviewed the resident/fellow/student note and agree with the documented findings and plan of care, except as noted. I have personally performed a substantive portion of the visit including all aspects of the medical decision making. My medical decision making and observations are found below. Please refer to any progress notes for updates on clinical course. My notes supersedes the above resident/fellow/student note in case of discrepancy    MDM:  30 y/o M w/ PMH of CML on oral immunologic presenting with lower abdominal pain.  Pain began yesterday to the lower abdomen following eating Mexican food, improving with defecation temporarily but returned after approximately 15 minutes, without associated urinary symptoms or testicular pain.  Reports diarrhea without blood. Denies fevers, chills, headache, dizziness, blurred vision, chest pain, cough, shortness of breath, nausea, vomiting, MSK pain, rash. Pt overall no acute distress. Lungs clear. HR tachycardic. Abd nondistended/soft/tenderness infraumbilicus. Non focal neuro exam. Calm and cooperative. Will obtain imaging to eval for acute intra abdominal pathology such as colitis vs. diverticulitis. Send stool studies if able to obtain stool sample. Eval for metabolic abnormalities including DOLORES and electrolyte abnormalities. Appears dehydrated, will provide IVF. Plan for labs, imaging, EKG, IVF, meds PRN. Will reassess the need for additional interventions as clinically warranted. Refer to any progress notes for updates on clinical course and as a continuation of this MDM.     I, Dr. Cristopher Hurd, independently interpreted the EKG which showed sinus tachycardia at a rate of 111.

## 2024-10-14 NOTE — H&P ADULT - NSHPLABSRESULTS_GEN_ALL_CORE
14.0   8.09  )-----------( 81       ( 14 Oct 2024 17:14 )             42.5     10-14    137  |  99  |  16  ----------------------------<  119[H]  3.6   |  22  |  1.02    Ca    9.9      14 Oct 2024 17:14  Mg     2.0     10-14    TPro  8.7[H]  /  Alb  6.2[H]  /  TBili  1.6[H]  /  DBili  0.2  /  AST  33  /  ALT  53[H]  /  AlkPhos  98  10-14    Urinalysis (10.14.24 @ 20:11)    pH Urine: 5.5    Glucose Qualitative, Urine: Negative mg/dL    Blood, Urine: Trace    Color: Yellow    Urine Appearance: Clear    Bilirubin: Negative    Ketone - Urine: Trace mg/dL    Specific Gravity: 1.007    Protein, Urine: Negative mg/dL    Urobilinogen: 0.2 mg/dL    Nitrite: Negative    Leukocyte Esterase Concentration: Negative    FluA/FluB/RSV/COVID PCR (10.14.24 @ 17:14)    SARS-CoV-2 Result: NotDetec    Influenza A Result: NotDetec    Influenza B Result: NotDetec    Resp Syn Virus Result: NotDetec    - - - - - - - - - - - - - - - - - - - - - - - - - - - - - - - - - - - - - - - - - - - - - - - - - - - -       EKG PERSONALLY REVIEWED:   Sinus tachycardia 110    IMAGES PERSONALLY REVIEWED:     < from: Xray Chest 1 View- PORTABLE-Urgent (10.14.24 @ 17:48) >  IMPRESSION: Clear lungs.    < from: CT Abdomen and Pelvis w/ IV Cont (10.14.24 @ 19:45) >  IMPRESSION:  Colonic wall thickening suspicious for colitis. Recommend clinical   correlation.    Bladder wall thickening could be due to underdistention or cystitis.   Recommend clinical and laboratory correlation.    Hepatosplenomegaly.

## 2024-10-14 NOTE — H&P ADULT - PROBLEM SELECTOR PLAN 2
- Allopurinol  - Hydroxyurea   - Fluconazole  - Valtrex  - Dasatinib held give acute process   - Onc consult to be called in AM -  Gradual decrease in PLT since Aug,  PLT now 81  - ? Med SE vs CML   - Reports taking  Quercetin supplement since apx end of Aug.  This supplement could potentially inhibit platelet aggregation.

## 2024-10-14 NOTE — ED ADULT NURSE NOTE - OBJECTIVE STATEMENT
Pt is a 28 yo M W/ PMHx of leukemia complaining of ABD pain, Pt states Pt is a 30 yo M W/ PMHx of leukemia complaining of ABD pain, Pt states he had a sudden onset of ABD pain 2 days ago after eating take out food. Pt stated pain is isolated to the center and pain feels sharp. Pt reports he ate the same food with his sister who also had an onset of ABD pain soon after. Pt states he began having chills yesterday night and today had diarrhea; pt denies any blood in BM. Pt is aox4, airway clear and patent, equal chest rise and fall, pulses intact, skin warm and dry but pale, ABD tender x4 quadrants and non distended x4 quadrants, sinus tach on cardiac monitor. Pt denies LOC, SOB, chest pain, headache, urinary s/s, and any other pain. Pt placed in locked lowered stretcher w/ rails raised.

## 2024-10-15 DIAGNOSIS — D69.6 THROMBOCYTOPENIA, UNSPECIFIED: ICD-10-CM

## 2024-10-15 LAB
ALBUMIN SERPL ELPH-MCNC: 3.8 G/DL — SIGNIFICANT CHANGE UP (ref 3.3–5)
ALP SERPL-CCNC: 67 U/L — SIGNIFICANT CHANGE UP (ref 40–120)
ALT FLD-CCNC: 33 U/L — SIGNIFICANT CHANGE UP (ref 10–45)
ANION GAP SERPL CALC-SCNC: 12 MMOL/L — SIGNIFICANT CHANGE UP (ref 5–17)
ANION GAP SERPL CALC-SCNC: 13 MMOL/L — SIGNIFICANT CHANGE UP (ref 5–17)
AST SERPL-CCNC: 18 U/L — SIGNIFICANT CHANGE UP (ref 10–40)
BASOPHILS # BLD AUTO: 0.04 K/UL — SIGNIFICANT CHANGE UP (ref 0–0.2)
BASOPHILS NFR BLD AUTO: 0.9 % — SIGNIFICANT CHANGE UP (ref 0–2)
BILIRUB SERPL-MCNC: 1.2 MG/DL — SIGNIFICANT CHANGE UP (ref 0.2–1.2)
BUN SERPL-MCNC: 10 MG/DL — SIGNIFICANT CHANGE UP (ref 7–23)
BUN SERPL-MCNC: 11 MG/DL — SIGNIFICANT CHANGE UP (ref 7–23)
CALCIUM SERPL-MCNC: 8.3 MG/DL — LOW (ref 8.4–10.5)
CALCIUM SERPL-MCNC: 8.7 MG/DL — SIGNIFICANT CHANGE UP (ref 8.4–10.5)
CAMPYLOBACTER DNA SPEC NAA+PROBE: DETECTED
CHLORIDE SERPL-SCNC: 106 MMOL/L — SIGNIFICANT CHANGE UP (ref 96–108)
CHLORIDE SERPL-SCNC: 108 MMOL/L — SIGNIFICANT CHANGE UP (ref 96–108)
CO2 SERPL-SCNC: 20 MMOL/L — LOW (ref 22–31)
CO2 SERPL-SCNC: 21 MMOL/L — LOW (ref 22–31)
CREAT SERPL-MCNC: 0.83 MG/DL — SIGNIFICANT CHANGE UP (ref 0.5–1.3)
CREAT SERPL-MCNC: 0.83 MG/DL — SIGNIFICANT CHANGE UP (ref 0.5–1.3)
EGFR: 122 ML/MIN/1.73M2 — SIGNIFICANT CHANGE UP
EGFR: 122 ML/MIN/1.73M2 — SIGNIFICANT CHANGE UP
EOSINOPHIL # BLD AUTO: 0 K/UL — SIGNIFICANT CHANGE UP (ref 0–0.5)
EOSINOPHIL NFR BLD AUTO: 0 % — SIGNIFICANT CHANGE UP (ref 0–6)
EPEC DNA STL QL NAA+PROBE: DETECTED
GAS PNL BLDV: SIGNIFICANT CHANGE UP
GI PCR PANEL: DETECTED
GLUCOSE SERPL-MCNC: 100 MG/DL — HIGH (ref 70–99)
GLUCOSE SERPL-MCNC: 107 MG/DL — HIGH (ref 70–99)
HCT VFR BLD CALC: 33 % — LOW (ref 39–50)
HCT VFR BLD CALC: 33.8 % — LOW (ref 39–50)
HGB BLD-MCNC: 10.8 G/DL — LOW (ref 13–17)
HGB BLD-MCNC: 11 G/DL — LOW (ref 13–17)
INR BLD: 1.24 RATIO — HIGH (ref 0.85–1.16)
LYMPHOCYTES # BLD AUTO: 0.64 K/UL — LOW (ref 1–3.3)
LYMPHOCYTES # BLD AUTO: 14.6 % — SIGNIFICANT CHANGE UP (ref 13–44)
MANUAL SMEAR VERIFICATION: SIGNIFICANT CHANGE UP
MCHC RBC-ENTMCNC: 27.4 PG — SIGNIFICANT CHANGE UP (ref 27–34)
MCHC RBC-ENTMCNC: 27.8 PG — SIGNIFICANT CHANGE UP (ref 27–34)
MCHC RBC-ENTMCNC: 32.5 GM/DL — SIGNIFICANT CHANGE UP (ref 32–36)
MCHC RBC-ENTMCNC: 32.7 GM/DL — SIGNIFICANT CHANGE UP (ref 32–36)
MCV RBC AUTO: 84.1 FL — SIGNIFICANT CHANGE UP (ref 80–100)
MCV RBC AUTO: 84.8 FL — SIGNIFICANT CHANGE UP (ref 80–100)
MONOCYTES # BLD AUTO: 0.04 K/UL — SIGNIFICANT CHANGE UP (ref 0–0.9)
MONOCYTES NFR BLD AUTO: 0.9 % — LOW (ref 2–14)
NEUTROPHILS # BLD AUTO: 3.67 K/UL — SIGNIFICANT CHANGE UP (ref 1.8–7.4)
NEUTROPHILS NFR BLD AUTO: 79.3 % — HIGH (ref 43–77)
NEUTS BAND # BLD: 4.3 % — SIGNIFICANT CHANGE UP (ref 0–8)
NRBC # BLD: 0 /100 WBCS — SIGNIFICANT CHANGE UP (ref 0–0)
PLAT MORPH BLD: NORMAL — SIGNIFICANT CHANGE UP
PLATELET # BLD AUTO: 68 K/UL — LOW (ref 150–400)
PLATELET # BLD AUTO: 68 K/UL — LOW (ref 150–400)
POTASSIUM SERPL-MCNC: 3.4 MMOL/L — LOW (ref 3.5–5.3)
POTASSIUM SERPL-MCNC: 3.4 MMOL/L — LOW (ref 3.5–5.3)
POTASSIUM SERPL-SCNC: 3.4 MMOL/L — LOW (ref 3.5–5.3)
POTASSIUM SERPL-SCNC: 3.4 MMOL/L — LOW (ref 3.5–5.3)
PROT SERPL-MCNC: 6.5 G/DL — SIGNIFICANT CHANGE UP (ref 6–8.3)
PROTHROM AB SERPL-ACNC: 14.1 SEC — HIGH (ref 9.9–13.4)
RBC # BLD: 3.89 M/UL — LOW (ref 4.2–5.8)
RBC # BLD: 4.02 M/UL — LOW (ref 4.2–5.8)
RBC # FLD: 15.8 % — HIGH (ref 10.3–14.5)
RBC # FLD: 16 % — HIGH (ref 10.3–14.5)
RBC BLD AUTO: SIGNIFICANT CHANGE UP
SODIUM SERPL-SCNC: 140 MMOL/L — SIGNIFICANT CHANGE UP (ref 135–145)
SODIUM SERPL-SCNC: 140 MMOL/L — SIGNIFICANT CHANGE UP (ref 135–145)
WBC # BLD: 3.43 K/UL — LOW (ref 3.8–10.5)
WBC # BLD: 4.39 K/UL — SIGNIFICANT CHANGE UP (ref 3.8–10.5)
WBC # FLD AUTO: 3.43 K/UL — LOW (ref 3.8–10.5)
WBC # FLD AUTO: 4.39 K/UL — SIGNIFICANT CHANGE UP (ref 3.8–10.5)

## 2024-10-15 PROCEDURE — 99255 IP/OBS CONSLTJ NEW/EST HI 80: CPT | Mod: GC

## 2024-10-15 PROCEDURE — 99254 IP/OBS CNSLTJ NEW/EST MOD 60: CPT

## 2024-10-15 PROCEDURE — 99233 SBSQ HOSP IP/OBS HIGH 50: CPT

## 2024-10-15 RX ORDER — PIPERACILLIN SODIUM AND TAZOBACTAM SODIUM 12; 1.5 G/60ML; G/60ML
3.38 INJECTION, POWDER, LYOPHILIZED, FOR SOLUTION INTRAVENOUS EVERY 8 HOURS
Refills: 0 | Status: DISCONTINUED | OUTPATIENT
Start: 2024-10-15 | End: 2024-10-16

## 2024-10-15 RX ORDER — AZITHROMYCIN 250 MG/1
500 TABLET, FILM COATED ORAL DAILY
Refills: 0 | Status: DISCONTINUED | OUTPATIENT
Start: 2024-10-15 | End: 2024-10-15

## 2024-10-15 RX ORDER — AZITHROMYCIN 250 MG/1
500 TABLET, FILM COATED ORAL DAILY
Refills: 0 | Status: DISCONTINUED | OUTPATIENT
Start: 2024-10-15 | End: 2024-10-16

## 2024-10-15 RX ORDER — AZITHROMYCIN 250 MG/1
TABLET, FILM COATED ORAL
Refills: 0 | Status: DISCONTINUED | OUTPATIENT
Start: 2024-10-15 | End: 2024-10-15

## 2024-10-15 RX ADMIN — AZITHROMYCIN 500 MILLIGRAM(S): 250 TABLET, FILM COATED ORAL at 16:44

## 2024-10-15 RX ADMIN — Medication 20 MILLIEQUIVALENT(S): at 22:25

## 2024-10-15 RX ADMIN — PIPERACILLIN SODIUM AND TAZOBACTAM SODIUM 25 GRAM(S): 12; 1.5 INJECTION, POWDER, LYOPHILIZED, FOR SOLUTION INTRAVENOUS at 13:14

## 2024-10-15 RX ADMIN — PIPERACILLIN SODIUM AND TAZOBACTAM SODIUM 25 GRAM(S): 12; 1.5 INJECTION, POWDER, LYOPHILIZED, FOR SOLUTION INTRAVENOUS at 21:21

## 2024-10-15 RX ADMIN — PIPERACILLIN SODIUM AND TAZOBACTAM SODIUM 25 GRAM(S): 12; 1.5 INJECTION, POWDER, LYOPHILIZED, FOR SOLUTION INTRAVENOUS at 05:35

## 2024-10-15 NOTE — CONSULT NOTE ADULT - SUBJECTIVE AND OBJECTIVE BOX
HEMATOLOGY ONCOLOGY CONSULT     Patient is a 29y old  Male who presents with a chief complaint of Abm pain (14 Oct 2024 23:22)      HPI:  29M pmh CML on p.o. chemo presenting with lower abdominal pain.  Pain began yesterday to the lower abdomen s/p eating Mexican food (buritto). Pain was temporarily relived w/defecation (non bloody diarrhea) but returned after apx 15 minutes later. Report apx 6 watery BM yesterday and about 4 episodes today. Sister and girlfriend also ate from same place, they developed diarrhea afterward but are currently feeling fine .  Denies associated urinary symptoms or testicular pain, or fever.   ROS: Denies HA, CP, SOB, palpitation, N/V, fever, cough, chills, dizziness, recent travel, sick contact, change in urinary habits   ED: 3.3L IVF, IV zosyn. CT A/P showing colitis (14 Oct 2024 23:22)     Hematology consulted for med management and cytopenias.     ROS:  Negative except for:    PAST MEDICAL & SURGICAL HISTORY:  Pneumothorax  x 2 episodes 4 yrs ago      CML (chronic myeloid leukemia)      S/P thoracostomy tube placement          SOCIAL HISTORY:    FAMILY HISTORY:      MEDICATIONS  (STANDING):  piperacillin/tazobactam IVPB.. 3.375 Gram(s) IV Intermittent every 8 hours  sodium chloride 0.9%. 1000 milliLiter(s) (100 mL/Hr) IV Continuous <Continuous>    MEDICATIONS  (PRN):  acetaminophen     Tablet .. 650 milliGRAM(s) Oral every 6 hours PRN Temp greater or equal to 38C (100.4F), Mild Pain (1 - 3)  aluminum hydroxide/magnesium hydroxide/simethicone Suspension 30 milliLiter(s) Oral every 4 hours PRN Dyspepsia  melatonin 3 milliGRAM(s) Oral at bedtime PRN Insomnia  ondansetron Injectable 4 milliGRAM(s) IV Push every 8 hours PRN Nausea and/or Vomiting      Allergies    No Known Allergies    Intolerances        Vital Signs Last 24 Hrs  T(C): 37.3 (15 Oct 2024 11:45), Max: 37.9 (14 Oct 2024 16:45)  T(F): 99.1 (15 Oct 2024 11:45), Max: 100.2 (14 Oct 2024 16:45)  HR: 92 (15 Oct 2024 11:45) (92 - 120)  BP: 96/56 (15 Oct 2024 11:45) (96/56 - 136/84)  BP(mean): 69 (14 Oct 2024 22:13) (67 - 70)  RR: 16 (15 Oct 2024 11:45) (16 - 20)  SpO2: 96% (15 Oct 2024 11:45) (96% - 99%)    Parameters below as of 15 Oct 2024 11:45  Patient On (Oxygen Delivery Method): room air        PHYSICAL EXAM  General: adult in NAD  HEENT: clear oropharynx, anicteric sclera, pink conjunctiva  Neck: supple  CV: normal S1/S2 with no murmur rubs or gallops  Lungs: positive air movement b/l ant lungs,clear to auscultation, no wheezes, no rales  Abdomen: soft non-tender non-distended, no hepatosplenomegaly  Ext: no clubbing cyanosis or edema  Skin: no rashes and no petechiae  Neuro: alert and oriented X 4, no focal deficits      LABS:                          10.8   4.39  )-----------( 68       ( 15 Oct 2024 06:35 )             33.0         Mean Cell Volume : 84.8 fl  Mean Cell Hemoglobin : 27.8 pg  Mean Cell Hemoglobin Concentration : 32.7 gm/dL  Auto Neutrophil # : 3.67 K/uL  Auto Lymphocyte # : 0.64 K/uL  Auto Monocyte # : 0.04 K/uL  Auto Eosinophil # : 0.00 K/uL  Auto Basophil # : 0.04 K/uL  Auto Neutrophil % : 79.3 %  Auto Lymphocyte % : 14.6 %  Auto Monocyte % : 0.9 %  Auto Eosinophil % : 0.0 %  Auto Basophil % : 0.9 %      10-15    140  |  108  |  11  ----------------------------<  107[H]  3.4[L]   |  20[L]  |  0.83    Ca    8.3[L]      15 Oct 2024 06:35  Mg     2.0     10-14    TPro  6.5  /  Alb  3.8  /  TBili  1.2  /  DBili  x   /  AST  18  /  ALT  33  /  AlkPhos  67  10-15      PT/INR - ( 15 Oct 2024 06:35 )   PT: 14.1 sec;   INR: 1.24 ratio         PTT - ( 14 Oct 2024 17:14 )  PTT:36.8 sec                BLOOD SMEAR INTERPRETATION:       RADIOLOGY & ADDITIONAL STUDIES:    < from: CT Abdomen and Pelvis w/ IV Cont (10.14.24 @ 19:45) >  Colonic wall thickening suspicious for colitis. Recommend clinical   correlation.    Bladder wall thickening could be due to underdistention or cystitis.   Recommend clinical and laboratory correlation.    Hepatosplenomegaly.    < end of copied text >     HEMATOLOGY ONCOLOGY CONSULT     Patient is a 29y old  Male who presents with a chief complaint of Abm pain (14 Oct 2024 23:22)      HPI:  29M pmh CML on p.o. chemo presenting with lower abdominal pain.  Pain began yesterday to the lower abdomen s/p eating Mexican food (buritto). Pain was temporarily relived w/defecation (non bloody diarrhea) but returned after apx 15 minutes later. Report apx 6 watery BM yesterday and about 4 episodes today. Sister and girlfriend also ate from same place, they developed diarrhea afterward but are currently feeling fine .  Denies associated urinary symptoms or testicular pain, or fever.   ROS: Denies HA, CP, SOB, palpitation, N/V, fever, cough, chills, dizziness, recent travel, sick contact, change in urinary habits   ED: 3.3L IVF, IV zosyn. CT A/P showing colitis (14 Oct 2024 23:22)     Hematology consulted for med management and cytopenias. Pt seen at bedside, states he is feeling ok, abdominal pain and diarrhea. We discussed with him holding off dasatinib while he has infection.    ROS:  Negative except for:    PAST MEDICAL & SURGICAL HISTORY:  Pneumothorax  x 2 episodes 4 yrs ago      CML (chronic myeloid leukemia)      S/P thoracostomy tube placement          SOCIAL HISTORY:    FAMILY HISTORY:      MEDICATIONS  (STANDING):  piperacillin/tazobactam IVPB.. 3.375 Gram(s) IV Intermittent every 8 hours  sodium chloride 0.9%. 1000 milliLiter(s) (100 mL/Hr) IV Continuous <Continuous>    MEDICATIONS  (PRN):  acetaminophen     Tablet .. 650 milliGRAM(s) Oral every 6 hours PRN Temp greater or equal to 38C (100.4F), Mild Pain (1 - 3)  aluminum hydroxide/magnesium hydroxide/simethicone Suspension 30 milliLiter(s) Oral every 4 hours PRN Dyspepsia  melatonin 3 milliGRAM(s) Oral at bedtime PRN Insomnia  ondansetron Injectable 4 milliGRAM(s) IV Push every 8 hours PRN Nausea and/or Vomiting      Allergies    No Known Allergies    Intolerances        Vital Signs Last 24 Hrs  T(C): 37.3 (15 Oct 2024 11:45), Max: 37.9 (14 Oct 2024 16:45)  T(F): 99.1 (15 Oct 2024 11:45), Max: 100.2 (14 Oct 2024 16:45)  HR: 92 (15 Oct 2024 11:45) (92 - 120)  BP: 96/56 (15 Oct 2024 11:45) (96/56 - 136/84)  BP(mean): 69 (14 Oct 2024 22:13) (67 - 70)  RR: 16 (15 Oct 2024 11:45) (16 - 20)  SpO2: 96% (15 Oct 2024 11:45) (96% - 99%)    Parameters below as of 15 Oct 2024 11:45  Patient On (Oxygen Delivery Method): room air        PHYSICAL EXAM  General: adult in NAD  HEENT: clear oropharynx, anicteric sclera, pink conjunctiva  Neck: supple  CV: normal S1/S2 with no murmur rubs or gallops  Lungs: positive air movement b/l ant lungs,clear to auscultation, no wheezes, no rales  Abdomen: soft non-tender non-distended, no hepatosplenomegaly  Ext: no clubbing cyanosis or edema  Skin: no rashes and no petechiae  Neuro: alert and oriented X 4, no focal deficits      LABS:                          10.8   4.39  )-----------( 68       ( 15 Oct 2024 06:35 )             33.0         Mean Cell Volume : 84.8 fl  Mean Cell Hemoglobin : 27.8 pg  Mean Cell Hemoglobin Concentration : 32.7 gm/dL  Auto Neutrophil # : 3.67 K/uL  Auto Lymphocyte # : 0.64 K/uL  Auto Monocyte # : 0.04 K/uL  Auto Eosinophil # : 0.00 K/uL  Auto Basophil # : 0.04 K/uL  Auto Neutrophil % : 79.3 %  Auto Lymphocyte % : 14.6 %  Auto Monocyte % : 0.9 %  Auto Eosinophil % : 0.0 %  Auto Basophil % : 0.9 %      10-15    140  |  108  |  11  ----------------------------<  107[H]  3.4[L]   |  20[L]  |  0.83    Ca    8.3[L]      15 Oct 2024 06:35  Mg     2.0     10-14    TPro  6.5  /  Alb  3.8  /  TBili  1.2  /  DBili  x   /  AST  18  /  ALT  33  /  AlkPhos  67  10-15      PT/INR - ( 15 Oct 2024 06:35 )   PT: 14.1 sec;   INR: 1.24 ratio         PTT - ( 14 Oct 2024 17:14 )  PTT:36.8 sec                BLOOD SMEAR INTERPRETATION:       RADIOLOGY & ADDITIONAL STUDIES:    < from: CT Abdomen and Pelvis w/ IV Cont (10.14.24 @ 19:45) >  Colonic wall thickening suspicious for colitis. Recommend clinical   correlation.    Bladder wall thickening could be due to underdistention or cystitis.   Recommend clinical and laboratory correlation.    Hepatosplenomegaly.    < end of copied text >

## 2024-10-15 NOTE — CONSULT NOTE ADULT - ATTENDING COMMENTS
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 Anemia and thrombocytopenia noted, likely secondary to TKI; cytopenias have worsened by infection. Noted that his BCR:: ABL p210 transcript is still >10% IS at 5 months since Tx initiation. Please assure compliance. If compliant and transcript remain >10% at 6-months, consider changing TKI (bosutinib may be an option). Agree with holding Dasatinib for now. Supportive.

## 2024-10-15 NOTE — PROGRESS NOTE ADULT - NSPROGADDITIONALINFOA_GEN_ALL_CORE
The necessity of the time spent during the encounter on this date of service was due to:   - Ordering, reviewing, and interpreting labs, testing, and imaging.  - Independently obtaining a review of systems and performing a physical exam  - Reviewing prior hospitalization and where necessary, outpatient records.  - Counselling and educating patient and family regarding interpretation of aforementioned items and plan of care.    Time-based billing (NON-critical care). Total minutes spent: 45

## 2024-10-15 NOTE — CONSULT NOTE ADULT - SUBJECTIVE AND OBJECTIVE BOX
HPI:    29 M with pmh CML on Dasatinib presented on 10/14 with lower abdominal pain and diarrhea. Symptoms started on Sunday after he ate Mexican food (chicken burrito) on Saturday night. Reported apx 6 watery BM on sunday and about 4 episodes on Monday . Sister and girlfriend also ate from same place, they developed diarrhea afterward but are currently feeling fine .  Denies associated urinary symptoms or testicular pain, or fever.   Found to have colitis on CTAP.    ID consulted for colitis management.     REVIEW OF SYSTEMS  [  ] ROS unobtainable because:    [X] All other systems negative except as noted below    Constitutional:  [X] fever [ ] chills  [ ] weight loss  [ ]night sweat  [X ]poor appetite/PO intake [ ]fatigue   Skin:  [ ] rash [ ] phlebitis	  Eyes: [ ] icterus [ ] pain  [ ] discharge	  ENMT: [ ] sore throat  [ ] thrush [ ] ulcers [ ] exudates [ ]anosmia  Respiratory: [ ] dyspnea [ ] hemoptysis [ ] cough [ ] sputum	  Cardiovascular:  [ ] chest pain [ ] palpitations [ ] edema	  Gastrointestinal:  [ ] nausea [ ] vomiting [X] diarrhea [ ] constipation [X ] pain	  Genitourinary:  [ ] dysuria [ ] frequency [ ] hematuria [ ] discharge [ ] flank pain  [ ] incontinence  Musculoskeletal:  [ ] myalgias [ ] arthralgias [ ] arthritis  [ ] back pain  Neurological:  [ ] headache [ ] weakness [ ] seizures  [ ] confusion/altered mental status    prior hospital charts reviewed [V]  primary team notes reviewed [V]  other consultant notes reviewed [V]    PAST MEDICAL & SURGICAL HISTORY:  Pneumothorax  x 2 episodes 4 yrs ago      CML (chronic myeloid leukemia)      S/P thoracostomy tube placement          SOCIAL HISTORY:  - Denied smoking/vaping/alcohol/recreational drug use    FAMILY HISTORY:      Allergies  No Known Allergies        ANTIMICROBIALS:  piperacillin/tazobactam IVPB.. 3.375 every 8 hours      ANTIMICROBIALS (past 90 days):  MEDICATIONS  (STANDING):  piperacillin/tazobactam IVPB..   25 mL/Hr IV Intermittent (10-15-24 @ 13:14)   25 mL/Hr IV Intermittent (10-15-24 @ 05:35)    piperacillin/tazobactam IVPB...   200 mL/Hr IV Intermittent (10-14-24 @ 21:28)        OTHER MEDS:   MEDICATIONS  (STANDING):  acetaminophen     Tablet .. 650 every 6 hours PRN  aluminum hydroxide/magnesium hydroxide/simethicone Suspension 30 every 4 hours PRN  melatonin 3 at bedtime PRN  ondansetron Injectable 4 every 8 hours PRN      VITALS:  Vital Signs Last 24 Hrs  T(F): 99.1 (10-15-24 @ 11:45), Max: 100.2 (10-14-24 @ 16:45)    Vital Signs Last 24 Hrs  HR: 92 (10-15-24 @ 11:45) (92 - 120)  BP: 96/56 (10-15-24 @ 11:45) (96/56 - 136/84)  RR: 16 (10-15-24 @ 11:45)  SpO2: 96% (10-15-24 @ 11:45) (96% - 99%)  Wt(kg): --    EXAM:  Physical Exam:  Constitutional:   comfortable  LUNGS:  CTA  CVS:  normal S1, S2, no murmur  Abd:  soft, lower abdominal tenderness  Ext:  no edema  Vascular:  no apparent phlebitis   Neuro: AAO X 3, non- focal    Labs:                        10.8   4.39  )-----------( 68       ( 15 Oct 2024 06:35 )             33.0     10-15    140  |  108  |  11  ----------------------------<  107[H]  3.4[L]   |  20[L]  |  0.83    Ca    8.3[L]      15 Oct 2024 06:35  Mg     2.0     10-14    TPro  6.5  /  Alb  3.8  /  TBili  1.2  /  DBili  x   /  AST  18  /  ALT  33  /  AlkPhos  67  10-15      WBC Trend:  WBC Count: 4.39 (10-15-24 @ 06:35)  WBC Count: 8.09 (10-14-24 @ 17:14)      Auto Neutrophil #: 3.67 K/uL (10-15-24 @ 06:35)  Band Neutrophils %: 4.3 % (10-15-24 @ 06:35)  Auto Neutrophil #: 6.76 K/uL (10-14-24 @ 17:14)  Band Neutrophils %: 5.2 % (10-14-24 @ 17:14)  Auto Neutrophil #: 3.32 K/uL (10-09-24 @ 14:57)      Creatine Trend:  Creatinine: 0.83 (10-15)  Creatinine: 1.02 (10-14)      Liver Biochemical Testing Trend:  Alanine Aminotransferase (ALT/SGPT): 33 (10-15)  Alanine Aminotransferase (ALT/SGPT): 53 *H* (10-14)  Alanine Aminotransferase (ALT/SGPT): 60 *H* (06-08)  Alanine Aminotransferase (ALT/SGPT): 53 *H* (06-07)  Alanine Aminotransferase (ALT/SGPT): 51 *H* (06-07)  Aspartate Aminotransferase (AST/SGOT): 18 (10-15-24 @ 06:35)  Aspartate Aminotransferase (AST/SGOT): 33 (10-14-24 @ 17:14)  Aspartate Aminotransferase (AST/SGOT): 53 (06-08-24 @ 07:33)  Aspartate Aminotransferase (AST/SGOT): 47 (06-07-24 @ 19:21)  Aspartate Aminotransferase (AST/SGOT): 49 (06-07-24 @ 06:49)  Bilirubin Total: 1.2 (10-15)  Bilirubin Total: 1.6 (10-14)  Bilirubin Direct: 0.2 (10-14)  Bilirubin Total: 0.7 (06-08)  Bilirubin Total: 0.6 (06-07)      Trend LDH  06-08-24 @ 07:33  1187[H]  06-07-24 @ 19:21  1275[H]  06-07-24 @ 06:49  1601[H]  06-06-24 @ 18:38  1630[H]  06-06-24 @ 06:52  1972[H]      Auto Eosinophil %: 0.0 % (10-15-24 @ 06:35)  Auto Eosinophil %: 0.0 % (10-14-24 @ 17:14)      Urinalysis Basic - ( 15 Oct 2024 06:35 )    Color: x / Appearance: x / SG: x / pH: x  Gluc: 107 mg/dL / Ketone: x  / Bili: x / Urobili: x   Blood: x / Protein: x / Nitrite: x   Leuk Esterase: x / RBC: x / WBC x   Sq Epi: x / Non Sq Epi: x / Bacteria: x        MICROBIOLOGY:        Culture - Blood (collected 02 Jun 2024 04:50)  Source: .Blood Blood-Peripheral  Final Report:    No growth at 5 days    Culture - Blood (collected 02 Jun 2024 04:25)  Source: .Blood Blood-Peripheral  Final Report:    No growth at 5 days    Culture - Urine (collected 02 Jun 2024 02:45)  Source: Clean Catch Clean Catch (Midstream)  Final Report:    <10,000 CFU/mL Normal Urogenital Minerva    HIV-1/2 Combo Result: Nonreact (06-02-24 @ 05:10)    Blood Gas Venous - Lactate: 1.6 (10-14 @ 17:14)    A1C with Estimated Average Glucose Result: 5.7 % (06-02-24 @ 05:10)      RADIOLOGY:  imaging below personally reviewed    < from: CT Abdomen and Pelvis w/ IV Cont (10.14.24 @ 19:45) >  IMPRESSION:    Colonic wall thickening suspicious for colitis. Recommend clinical   correlation.    Bladder wall thickening could be due to underdistention or cystitis.   Recommend clinical and laboratory correlation.    Hepatosplenomegaly.    --- End of Report ---      < end of copied text >

## 2024-10-15 NOTE — PROGRESS NOTE ADULT - PROBLEM SELECTOR PLAN 2
-  Gradual decrease in PLT since Aug,  PLT now 81  - ? Med SE vs CML   - Reports taking  Quercetin supplement since apx end of Aug.  This supplement could potentially inhibit platelet aggregation.

## 2024-10-15 NOTE — CONSULT NOTE ADULT - ASSESSMENT
29 M with pmh CML on Dasatinib presented on 10/14 with lower abdominal pain and diarrhea.   Symptoms started less than 24 hours after having chicken burrito ordred.  No recent travel    Upon presentation, patient had fever to 100.2F    workup:   CT AP: Colonic wall thickening suspicious for colitis.     #Abdominal pain, Bloody diarrhea, colitis  #Fever  #Hx of CML on Dasatinib      Recommendations:   -Continue Zosyn 3.375 g IV q 8hrs for now   -If GI PCR + Campylobacter - start Azithromycin 500 mg po daily X 3   -F/up GI PCR   -F/up stool Cx   -F/up blood Cx  -Appropriate hydration     Discussed with primary team    Aldo Perera MD  ID physician  [employer] Teams Preferred  After 5pm/weekends call [phone]

## 2024-10-15 NOTE — PROGRESS NOTE ADULT - PROBLEM SELECTOR PLAN 1
- P/w adm pain and nonbloody diarrhea s/p eating a burrito  Notes his sister and girlfriend also ate from same place, developed GI sx for 1day and are now improved   - CT A/P: Colonic wall thickening suspicious for colitis  - C/w IV zosyn   - F/u GI PCR, Ova-parasites, GI pcr, stool culture   - CLD, advance as tolerates

## 2024-10-15 NOTE — PROGRESS NOTE ADULT - PROBLEM SELECTOR PLAN 3
- Dasatinib held give acute process   - Heme consulted    dispo: hopefully home in the next 1-2 days pending improvement in diarrhea, stool studies, abx plan, ID clearance .Plan d/w and agreed on by pt

## 2024-10-15 NOTE — PATIENT PROFILE ADULT - NSPROGENBLOODRESTRICT_GEN_A_NUR
Pt. educated on importance of blood transfusions. Pt. states he is opposed to receiving blood products because they are "creepy"./blood borne infection concerns

## 2024-10-15 NOTE — CONSULT NOTE ADULT - ASSESSMENT
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 Hematology consulted for med management and bicytopenia.     #CML  #Acute colitis  - sees Dr Mendiola at [facility], started on dasatinib 6/4/24. Baseline Plt ~90K-100K  - US spleen size of 24.9cm  - Last BCR-ABL transcript 10/4/24 positive BCR-ABL1: >10.000 %  on the International Scale  - CBC on admission with mild anemia and thrombocytopenia--Hb 10.8, Plt 68. ANC 3.67 non-neutropenic. suspect bicytopenia is iso infection myelosuppression    Recommendations:  - hold dasatinib while admitted with acute infection  - f/u stool cultures and GI PCR, supportive care per primary team  - Monitor CBCs, transfuse for Hb<7 and Plt<10, Plt<15 and febrile, or Plt<50 and bleeding  - outpatient followup with [facility] Dr Mendiola when able to be discharged.    Recommendations are preliminary. To be seen and discussed with attending.     Maranda Minor MD PGY-4  Hematology/Oncology Fellow  Available on [place] TEAMS  Pagers: [phone]; [facility] [phone]  29M with CML on dasatinib admitted for colitis. Hematology consulted for med management and bicytopenia.     #CML  #Acute colitis  - sees Dr Mendiola at [facility], started on dasatinib 6/4/24. Baseline Plt ~90K-100K  - US spleen size of 24.9cm  - Last BCR-ABL transcript 10/4/24 positive BCR-ABL1: >10.000 %  on the International Scale  - CBC on admission with mild anemia and thrombocytopenia--Hb 10.8, Plt 68. ANC 3.67 non-neutropenic. Suspect bicytopenia is iso infection myelosuppression    Recommendations:  - hold dasatinib while admitted with acute infection  - f/u stool cultures and GI PCR; empiric abx and supportive care per primary team  - Monitor CBCs, transfuse for Hb<7 and Plt<10, Plt<15 and febrile, or Plt<50 and bleeding  - outpatient followup with [facility] Dr Mendiola when able to be discharged.    Seen and discussed with attending Dr Partida,    Maranda Minor MD PGY-4  Hematology/Oncology Fellow  Available on MS TEAMS  Pagers: [phone]; [facility] [phone]

## 2024-10-16 ENCOUNTER — TRANSCRIPTION ENCOUNTER (OUTPATIENT)
Age: 29
End: 2024-10-16

## 2024-10-16 VITALS
OXYGEN SATURATION: 96 % | TEMPERATURE: 97 F | RESPIRATION RATE: 18 BRPM | DIASTOLIC BLOOD PRESSURE: 68 MMHG | SYSTOLIC BLOOD PRESSURE: 110 MMHG | HEART RATE: 73 BPM

## 2024-10-16 LAB
ANION GAP SERPL CALC-SCNC: 15 MMOL/L — SIGNIFICANT CHANGE UP (ref 5–17)
BUN SERPL-MCNC: 9 MG/DL — SIGNIFICANT CHANGE UP (ref 7–23)
CALCIUM SERPL-MCNC: 9.3 MG/DL — SIGNIFICANT CHANGE UP (ref 8.4–10.5)
CHLORIDE SERPL-SCNC: 103 MMOL/L — SIGNIFICANT CHANGE UP (ref 96–108)
CO2 SERPL-SCNC: 22 MMOL/L — SIGNIFICANT CHANGE UP (ref 22–31)
CREAT SERPL-MCNC: 0.86 MG/DL — SIGNIFICANT CHANGE UP (ref 0.5–1.3)
CULTURE RESULTS: NO GROWTH — SIGNIFICANT CHANGE UP
CULTURE RESULTS: SIGNIFICANT CHANGE UP
EGFR: 120 ML/MIN/1.73M2 — SIGNIFICANT CHANGE UP
GLUCOSE SERPL-MCNC: 85 MG/DL — SIGNIFICANT CHANGE UP (ref 70–99)
HCT VFR BLD CALC: 34.3 % — LOW (ref 39–50)
HGB BLD-MCNC: 11.2 G/DL — LOW (ref 13–17)
MCHC RBC-ENTMCNC: 27.5 PG — SIGNIFICANT CHANGE UP (ref 27–34)
MCHC RBC-ENTMCNC: 32.7 GM/DL — SIGNIFICANT CHANGE UP (ref 32–36)
MCV RBC AUTO: 84.3 FL — SIGNIFICANT CHANGE UP (ref 80–100)
NRBC # BLD: 0 /100 WBCS — SIGNIFICANT CHANGE UP (ref 0–0)
PLATELET # BLD AUTO: 74 K/UL — LOW (ref 150–400)
POTASSIUM SERPL-MCNC: 3.7 MMOL/L — SIGNIFICANT CHANGE UP (ref 3.5–5.3)
POTASSIUM SERPL-SCNC: 3.7 MMOL/L — SIGNIFICANT CHANGE UP (ref 3.5–5.3)
RBC # BLD: 4.07 M/UL — LOW (ref 4.2–5.8)
RBC # FLD: 15.7 % — HIGH (ref 10.3–14.5)
SODIUM SERPL-SCNC: 140 MMOL/L — SIGNIFICANT CHANGE UP (ref 135–145)
SPECIMEN SOURCE: SIGNIFICANT CHANGE UP
SPECIMEN SOURCE: SIGNIFICANT CHANGE UP
WBC # BLD: 2.84 K/UL — LOW (ref 3.8–10.5)
WBC # FLD AUTO: 2.84 K/UL — LOW (ref 3.8–10.5)

## 2024-10-16 PROCEDURE — 71045 X-RAY EXAM CHEST 1 VIEW: CPT

## 2024-10-16 PROCEDURE — 83605 ASSAY OF LACTIC ACID: CPT

## 2024-10-16 PROCEDURE — 86901 BLOOD TYPING SEROLOGIC RH(D): CPT

## 2024-10-16 PROCEDURE — 84295 ASSAY OF SERUM SODIUM: CPT

## 2024-10-16 PROCEDURE — 87046 STOOL CULTR AEROBIC BACT EA: CPT

## 2024-10-16 PROCEDURE — 87045 FECES CULTURE AEROBIC BACT: CPT

## 2024-10-16 PROCEDURE — 85018 HEMOGLOBIN: CPT

## 2024-10-16 PROCEDURE — 87040 BLOOD CULTURE FOR BACTERIA: CPT

## 2024-10-16 PROCEDURE — 87637 SARSCOV2&INF A&B&RSV AMP PRB: CPT

## 2024-10-16 PROCEDURE — 82435 ASSAY OF BLOOD CHLORIDE: CPT

## 2024-10-16 PROCEDURE — 82947 ASSAY GLUCOSE BLOOD QUANT: CPT

## 2024-10-16 PROCEDURE — 80053 COMPREHEN METABOLIC PANEL: CPT

## 2024-10-16 PROCEDURE — 85610 PROTHROMBIN TIME: CPT

## 2024-10-16 PROCEDURE — 85025 COMPLETE CBC W/AUTO DIFF WBC: CPT

## 2024-10-16 PROCEDURE — 86900 BLOOD TYPING SEROLOGIC ABO: CPT

## 2024-10-16 PROCEDURE — 87086 URINE CULTURE/COLONY COUNT: CPT

## 2024-10-16 PROCEDURE — 83735 ASSAY OF MAGNESIUM: CPT

## 2024-10-16 PROCEDURE — 82803 BLOOD GASES ANY COMBINATION: CPT

## 2024-10-16 PROCEDURE — 82248 BILIRUBIN DIRECT: CPT

## 2024-10-16 PROCEDURE — 80048 BASIC METABOLIC PNL TOTAL CA: CPT

## 2024-10-16 PROCEDURE — 87507 IADNA-DNA/RNA PROBE TQ 12-25: CPT

## 2024-10-16 PROCEDURE — 86850 RBC ANTIBODY SCREEN: CPT

## 2024-10-16 PROCEDURE — 81001 URINALYSIS AUTO W/SCOPE: CPT

## 2024-10-16 PROCEDURE — 85027 COMPLETE CBC AUTOMATED: CPT

## 2024-10-16 PROCEDURE — 84132 ASSAY OF SERUM POTASSIUM: CPT

## 2024-10-16 PROCEDURE — 99285 EMERGENCY DEPT VISIT HI MDM: CPT

## 2024-10-16 PROCEDURE — 99239 HOSP IP/OBS DSCHRG MGMT >30: CPT

## 2024-10-16 PROCEDURE — 87177 OVA AND PARASITES SMEARS: CPT

## 2024-10-16 PROCEDURE — 87077 CULTURE AEROBIC IDENTIFY: CPT

## 2024-10-16 PROCEDURE — 36415 COLL VENOUS BLD VENIPUNCTURE: CPT

## 2024-10-16 PROCEDURE — 96374 THER/PROPH/DIAG INJ IV PUSH: CPT

## 2024-10-16 PROCEDURE — 99232 SBSQ HOSP IP/OBS MODERATE 35: CPT

## 2024-10-16 PROCEDURE — 85730 THROMBOPLASTIN TIME PARTIAL: CPT

## 2024-10-16 PROCEDURE — 74177 CT ABD & PELVIS W/CONTRAST: CPT | Mod: MC

## 2024-10-16 PROCEDURE — 82330 ASSAY OF CALCIUM: CPT

## 2024-10-16 PROCEDURE — 85014 HEMATOCRIT: CPT

## 2024-10-16 RX ORDER — ACETAMINOPHEN 325 MG
2 TABLET ORAL
Qty: 0 | Refills: 0 | DISCHARGE
Start: 2024-10-16

## 2024-10-16 RX ADMIN — PIPERACILLIN SODIUM AND TAZOBACTAM SODIUM 25 GRAM(S): 12; 1.5 INJECTION, POWDER, LYOPHILIZED, FOR SOLUTION INTRAVENOUS at 05:46

## 2024-10-16 RX ADMIN — AZITHROMYCIN 500 MILLIGRAM(S): 250 TABLET, FILM COATED ORAL at 11:08

## 2024-10-16 NOTE — PROGRESS NOTE ADULT - ASSESSMENT
29 M with pmh CML on Dasatinib presented on 10/14 with lower abdominal pain and diarrhea.   Symptoms started less than 24 hours after having chicken burrito ordred.  No recent travel    Upon presentation, patient had fever to 100.2F    workup:   CT AP: Colonic wall thickening suspicious for colitis.   GI PCR + Campylobacter and EPEC    #Abdominal pain, Bloody diarrhea, Campyolobacter colitis  #EPEC likely represent a false positive test   #Fever  #Pancytopenia  #Hx of CML on Dasatinib      Recommendations:   -Continue Azithromycin 500 mg po daily X 3 days   -DC Zosyn  -Appropriate hydration   -Hem/Onc f/up for pancytopenia   -No ID objection for discharge     Discussed with primary team    Aldo Perera MD  ID physician  [facility] Teams Preferred  After 5pm/weekends call [phone]

## 2024-10-16 NOTE — DISCHARGE NOTE PROVIDER - NSDCMRMEDTOKEN_GEN_ALL_CORE_FT
dasatinib 100 mg oral tablet: 1 tab(s) orally once a day  Proventil HFA 90 mcg/inh inhalation aerosol: 2 puff(s) inhaled every 4 hours, As Needed- for shortness of breath and/or wheezing    acetaminophen 325 mg oral tablet: 2 tab(s) orally every 6 hours As needed Temp greater or equal to 38C (100.4F), Mild Pain (1 - 3)  azithromycin 500 mg oral tablet: 1 tab(s) orally once  Proventil HFA 90 mcg/inh inhalation aerosol: 2 puff(s) inhaled every 4 hours, As Needed- for shortness of breath and/or wheezing

## 2024-10-16 NOTE — DISCHARGE NOTE PROVIDER - NSDCFUSCHEDAPPT_GEN_ALL_CORE_FT
Bud Byers  [facility]  [address] [facility]  Scheduled Appointment: 10/29/2024    [facility]  [facility]  Scheduled Appointment: 10/30/2024    [facility]  [facility]  Scheduled Appointment: 10/30/2024    [facility]  [facility]  Scheduled Appointment: 11/20/2024    Melissa Mendiola  [facility]  [facility]  Scheduled Appointment: 11/20/2024    Sanjay Cary  [facility]  [facility] [address]  Scheduled Appointment: 11/26/2024     Bud Byers  [facility]  [address] [facility]  Scheduled Appointment: 10/23/2024    [facility]  [facility]  Scheduled Appointment: 10/30/2024    [facility]  [facility]  Scheduled Appointment: 10/30/2024    [facility]  [facility]  Scheduled Appointment: 11/20/2024    Melissa Mendiola  [facility]  [facility]  Scheduled Appointment: 11/20/2024    Sanjay Cary  [facility]  [facility] [address]  Scheduled Appointment: 11/26/2024

## 2024-10-16 NOTE — PROGRESS NOTE ADULT - NS ATTEND AMEND GEN_ALL_CORE FT
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 Anemia and thrombocytopenia noted, likely secondary to TKI; cytopenias have worsened by infection. Noted that his BCR:: ABL p210 transcript is still >10% IS at 5 months since Tx initiation. Patient vows compliance to the medication. If compliant and transcript remain >10% at 6-months, consider changing TKI (bosutinib may be an option). Agree with holding Dasatinib for now. Supportive.

## 2024-10-16 NOTE — DISCHARGE NOTE PROVIDER - NSDCFUADDAPPT_GEN_ALL_CORE_FT
APPTS ARE READY TO BE MADE: [x ] YES    Best Family or Patient Contact (if needed):    Additional Information about above appointments (if needed):    1:   2:   3:     Other comments or requests:    APPTS ARE READY TO BE MADE: [x ] YES    Best Family or Patient Contact (if needed):    Additional Information about above appointments (if needed):    1: Hematology/Oncology Dr. Mendiola within 1 week of discharge  2: Primary Care  3:     Other comments or requests:    APPTS ARE READY TO BE MADE: [x ] YES    Best Family or Patient Contact (if needed):    Additional Information about above appointments (if needed):    1: Hematology/Oncology Dr. Mendiola within 1 week of discharge  2: Primary Care 1-2 weeks after discharge  3:     Other comments or requests:    APPTS ARE READY TO BE MADE: [x ] YES    Best Family or Patient Contact (if needed):    Additional Information about above appointments (if needed):    1: Hematology/Oncology Dr. Mendiola within 1 week of discharge  2: Primary Care 1-2 weeks after discharge  3:     Other comments or requests:     Prior to outreaching the patient, it was visible that the patient has secured a follow up appointment which was not scheduled by our team. Patient is scheduled with Dr. Mendiola 10/30 2:30pm [address] [place] [address]     Prior to outreaching the patient, it was visible that the patient has secured a follow up appointment which was not scheduled by our team. Patient is scheduled with Dr. Cary 11/26 3:00pm [address] [facility]

## 2024-10-16 NOTE — PROGRESS NOTE ADULT - ASSESSMENT
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 Hematology consulted for med management and bicytopenia.       #CML  #Acute colitis  - sees Dr Mendiola at [facility], started on dasatinib 6/4/24. Baseline Plt ~90K-100K  - US spleen size of 24.9cm  - Last BCR-ABL transcript 10/4/24 positive BCR-ABL1: >10.000 %  on the International Scale  - CBC on admission with mild anemia and thrombocytopenia--Hb 10.8, Plt 68. ANC 3.67 non-neutropenic. Suspect bicytopenia is iso infection myelosuppression      Recommendations:  - hold dasatinib while admitted with acute infection, can resume after completion of antibiotics or as advised by Dr. Mendiola  - stool cultures and GI PCR positive for EPEC and campylobacter; empiric abx and supportive care per primary team  - Monitor CBCs, transfuse for Hb<7 and Plt<10, Plt<15 and febrile, or Plt<50 and bleeding  - outpatient followup with [facility] Dr. Mendiola when able to be discharged.        Will continue to monitor the patient.  Please call via MS Teams with any questions.  Above reviewed with th patient and Attending Dr. Partida.      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 Hematology consulted for med management and bicytopenia.       #CML  #Acute colitis  - sees Dr Mendiola at [facility], started on dasatinib 6/4/24. Baseline Plt ~90K-100K  - US spleen size of 24.9cm  - Last BCR-ABL transcript 10/4/24 positive BCR-ABL1: >10.000 %  on the International Scale  - CBC on admission with mild anemia and thrombocytopenia--Hb 10.8, Plt 68. ANC 3.67 non-neutropenic. Suspect bicytopenia is iso infection myelosuppression      Recommendations:  - hold dasatinib while admitted with acute infection, can resume after completion of antibiotics or as advised by Dr. Mendiola  - stool cultures and GI PCR positive for EPEC and campylobacter; empiric abx and supportive care per primary team  - Monitor CBCs, transfuse for Hb<7 and Plt<10, Plt<15 and febrile, or Plt<50 and bleeding  - outpatient followup with [facility] Dr. Mendiola when able to be discharged.        Will continue to monitor the patient.  Please call via MS Teams with any questions.  Above reviewed with th patient and Attending Dr. Partida.      *Note not finalized until signed by Attending Physician

## 2024-10-16 NOTE — PROGRESS NOTE ADULT - SUBJECTIVE AND OBJECTIVE BOX
Follow Up:      Interval History/ROS:Patient is a 29y old  Male who presents with a chief complaint of Abm pain (15 Oct 2024 14:51)  Seen at bedside, feeling better. He wants to go home. Stated abdominal pain and diarrhea improved.   Had 1 BM since am, loose but not bloody anymore.   Afebrile.     Allergies  No Known Allergies    ANTIMICROBIALS:    azithromycin   Tablet 500 daily    OTHER MEDS: MEDICATIONS  (STANDING):  acetaminophen     Tablet .. 650 every 6 hours PRN  aluminum hydroxide/magnesium hydroxide/simethicone Suspension 30 every 4 hours PRN  melatonin 3 at bedtime PRN  ondansetron Injectable 4 every 8 hours PRN    Vital Signs Last 24 Hrs  T(F): 97.5 (10-16-24 @ 05:25), Max: 100.2 (10-14-24 @ 16:45)    Vital Signs Last 24 Hrs  HR: 60 (10-16-24 @ 05:25) (60 - 84)  BP: 105/64 (10-16-24 @ 05:25) (104/65 - 118/79)  RR: 18 (10-16-24 @ 05:25)  SpO2: 98% (10-16-24 @ 05:25) (96% - 98%)  Wt(kg): --    EXAM:    GA: NAD  CV: nl S1/S2, no RMG  Lungs: CTAB, no distress  Abd: soft, mild lower abdomen tenderness  Ext: no edema  Neuro: No focal deficits  Skin: Intact  IV: no phlebitis    Labs:                        11.2   2.84  )-----------( 74       ( 16 Oct 2024 07:03 )             34.3     10-16    140  |  103  |  9   ----------------------------<  85  3.7   |  22  |  0.86    Ca    9.3      16 Oct 2024 07:05  Mg     2.0     10-14    TPro  6.5  /  Alb  3.8  /  TBili  1.2  /  DBili  x   /  AST  18  /  ALT  33  /  AlkPhos  67  10-15      WBC Trend:  WBC Count: 2.84 (10-16-24 @ 07:03)  WBC Count: 3.43 (10-15-24 @ 14:46)  WBC Count: 4.39 (10-15-24 @ 06:35)  WBC Count: 8.09 (10-14-24 @ 17:14)      Creatine Trend:  Creatinine: 0.86 (10-16)  Creatinine: 0.83 (10-15)  Creatinine: 0.83 (10-15)  Creatinine: 1.02 (10-14)      Liver Biochemical Testing Trend:  Alanine Aminotransferase (ALT/SGPT): 33 (10-15)  Alanine Aminotransferase (ALT/SGPT): 53 *H* (10-14)  Alanine Aminotransferase (ALT/SGPT): 60 *H* (06-08)  Alanine Aminotransferase (ALT/SGPT): 53 *H* (06-07)  Alanine Aminotransferase (ALT/SGPT): 51 *H* (06-07)  Aspartate Aminotransferase (AST/SGOT): 18 (10-15-24 @ 06:35)  Aspartate Aminotransferase (AST/SGOT): 33 (10-14-24 @ 17:14)  Aspartate Aminotransferase (AST/SGOT): 53 (06-08-24 @ 07:33)  Aspartate Aminotransferase (AST/SGOT): 47 (06-07-24 @ 19:21)  Aspartate Aminotransferase (AST/SGOT): 49 (06-07-24 @ 06:49)  Bilirubin Total: 1.2 (10-15)  Bilirubin Total: 1.6 (10-14)  Bilirubin Direct: 0.2 (10-14)  Bilirubin Total: 0.7 (06-08)  Bilirubin Total: 0.6 (06-07)      Trend LDH  06-08-24 @ 07:33  1187[H]  06-07-24 @ 19:21  1275[H]  06-07-24 @ 06:49  1601[H]  06-06-24 @ 18:38  1630[H]  06-06-24 @ 06:52  1972[H]      Urinalysis Basic - ( 16 Oct 2024 07:05 )    Color: x / Appearance: x / SG: x / pH: x  Gluc: 85 mg/dL / Ketone: x  / Bili: x / Urobili: x   Blood: x / Protein: x / Nitrite: x   Leuk Esterase: x / RBC: x / WBC x   Sq Epi: x / Non Sq Epi: x / Bacteria: x        MICROBIOLOGY:        Culture - Urine (collected 14 Oct 2024 20:11)  Source: Clean Catch Clean Catch (Midstream)  Final Report:    No growth    Culture - Blood (collected 14 Oct 2024 17:00)  Source: .Blood BLOOD  Preliminary Report:    No growth at 24 hours    Culture - Blood (collected 14 Oct 2024 16:45)  Source: .Blood BLOOD  Preliminary Report:    No growth at 24 hours    Culture - Blood (collected 02 Jun 2024 04:50)  Source: .Blood Blood-Peripheral  Final Report:    No growth at 5 days    Culture - Blood (collected 02 Jun 2024 04:25)  Source: .Blood Blood-Peripheral  Final Report:    No growth at 5 days    Culture - Urine (collected 02 Jun 2024 02:45)  Source: Clean Catch Clean Catch (Midstream)  Final Report:    <10,000 CFU/mL Normal Urogenital Minerva      HIV-1/2 Combo Result: Nonreact (06-02-24 @ 05:10)                                        Blood Gas Venous - Lactate: 1.0 (10-15 @ 14:46)  Blood Gas Venous - Lactate: 1.6 (10-14 @ 17:14)        RADIOLOGY:  imaging below personally reviewed    Xray Chest 1 View- PORTABLE-Urgent:  (14 Oct 2024 17:48)              CT Abdomen and Pelvis w/ IV Cont:   ACC: [id number] EXAM:  CT ABDOMEN AND PELVIS IC   ORDERED BY: JUSTINE FAN     PROCEDURE DATE:  10/14/2024          INTERPRETATION:  CLINICAL INFORMATION: Abdominal pain and diarrhea. CML   on chemotherapy.    COMPARISON: Abdominal ultrasound 6/4/2024    CONTRAST/COMPLICATIONS:  IV Contrast: Omnipaque 350  90 cc administered   10 cc discarded  Oral Contrast: NONE  Complications: None reported at time of study completion    PROCEDURE:  CT of the Abdomen and Pelvis was performed.  Sagittal and coronal reformats were performed.    FINDINGS:  LOWER CHEST: Trace bilateral lower lobe atelectasis.    LIVER: Hepatomegaly measuring 20 cm in the craniocaudal dimension.  BILE DUCTS: Normal caliber.  GALLBLADDER: Within normal limits.  SPLEEN: Splenomegaly measuring 16 cm.  PANCREAS: Within normal limits.  ADRENALS: Within normal limits.  KIDNEYS/URETERS: Within normal limits.    BLADDER: Mild bladder wall thickening.  REPRODUCTIVE ORGANS: Prostate within normal limits.    BOWEL: No bowel obstruction. Appendix is normal. Colonic wall thickening   particularly along the the cecum, ascending colon, and transverse colon   which are underdistended. Wall thickening is also noted in the rectum and   distal sigmoid colon.  PERITONEUM/RETROPERITONEUM: Within normal limits.  VESSELS: Within normal limits.  LYMPH NODES: No lymphadenopathy.  ABDOMINAL WALL: Within normal limits.  BONES: Within normal limits.    IMPRESSION:    Colonic wall thickening suspicious for colitis. Recommend clinical   correlation.    Bladder wall thickening could be due to underdistention or cystitis.   Recommend clinical and laboratory correlation.    Hepatosplenomegaly.    --- End of Report ---           ROBERT ROMAN DO; Resident Radiologist  This document hasbeen electronically signed.  TIN TAYLOR MD; Attending Radiologist  This document has been electronically signed. Oct 14 2024  8:19PM (10-14-24 @ 19:45)      
Jaden Solis MD  Division of Hospital Medicine  Available on MS teams until 7pm  If no response or off-hours, page [phone]  -------------------------------------    Patient is a 29y old  Male who presents with a chief complaint of Abm pain (15 Oct 2024 14:06)      SUBJECTIVE / OVERNIGHT EVENTS: none acute  ADDITIONAL REVIEW OF SYSTEMS: pt notes had 2 watery BM's overnight, each with a little blood tinge. No fevers/chills. No abd pain. Is overall feeling much better after starting zosyn.    MEDICATIONS  (STANDING):  piperacillin/tazobactam IVPB.. 3.375 Gram(s) IV Intermittent every 8 hours  sodium chloride 0.9%. 1000 milliLiter(s) (100 mL/Hr) IV Continuous <Continuous>    MEDICATIONS  (PRN):  acetaminophen     Tablet .. 650 milliGRAM(s) Oral every 6 hours PRN Temp greater or equal to 38C (100.4F), Mild Pain (1 - 3)  aluminum hydroxide/magnesium hydroxide/simethicone Suspension 30 milliLiter(s) Oral every 4 hours PRN Dyspepsia  melatonin 3 milliGRAM(s) Oral at bedtime PRN Insomnia  ondansetron Injectable 4 milliGRAM(s) IV Push every 8 hours PRN Nausea and/or Vomiting      CAPILLARY BLOOD GLUCOSE        I&O's Summary      PHYSICAL EXAM:  Vital Signs Last 24 Hrs  T(C): 37.1 (15 Oct 2024 14:35), Max: 37.9 (14 Oct 2024 16:45)  T(F): 98.8 (15 Oct 2024 14:35), Max: 100.2 (14 Oct 2024 16:45)  HR: 83 (15 Oct 2024 14:35) (83 - 120)  BP: 118/75 (15 Oct 2024 14:35) (96/56 - 136/84)  BP(mean): 69 (14 Oct 2024 22:13) (67 - 70)  RR: 18 (15 Oct 2024 14:35) (16 - 20)  SpO2: 96% (15 Oct 2024 14:35) (96% - 99%)    Parameters below as of 15 Oct 2024 14:35  Patient On (Oxygen Delivery Method): room air      CONSTITUTIONAL: NAD  EYES: PERRLA; conjunctiva and sclera clear  ENMT: MMM  NECK: Supple  RESPIRATORY: Normal respiratory effort; CTAB  CARDIOVASCULAR: RRR, no JVD, no peripheral edema   ABDOMEN: Nontender to palpation, normoactive BS, no guarding/rigidity  MUSCLOSKELETAL:  no clubbing/cyanosis, no joint swelling or tenderness to palpation  PSYCH: A+O x 3, affect normal  NEUROLOGY: CN 2-12 are intact and symmetric; no gross sensory or motor deficits  SKIN: No rashes; no palpable lesions    LABS:                        10.8   4.39  )-----------( 68       ( 15 Oct 2024 06:35 )             33.0     10-15    140  |  108  |  11  ----------------------------<  107[H]  3.4[L]   |  20[L]  |  0.83    Ca    8.3[L]      15 Oct 2024 06:35  Mg     2.0     10-14    TPro  6.5  /  Alb  3.8  /  TBili  1.2  /  DBili  x   /  AST  18  /  ALT  33  /  AlkPhos  67  10-15    PT/INR - ( 15 Oct 2024 06:35 )   PT: 14.1 sec;   INR: 1.24 ratio         PTT - ( 14 Oct 2024 17:14 )  PTT:36.8 sec      Urinalysis Basic - ( 15 Oct 2024 06:35 )    Color: x / Appearance: x / SG: x / pH: x  Gluc: 107 mg/dL / Ketone: x  / Bili: x / Urobili: x   Blood: x / Protein: x / Nitrite: x   Leuk Esterase: x / RBC: x / WBC x   Sq Epi: x / Non Sq Epi: x / Bacteria: x          RADIOLOGY & ADDITIONAL TESTS:  Results Reviewed:   Imaging Personally Reviewed:  Electrocardiogram Personally Reviewed:    COORDINATION OF CARE:  Care Discussed with Consultants/Other Providers [Y/N]:  Prior or Outpatient Records Reviewed [Y/N]:  
INTERVAL HPI/OVERNIGHT EVENTS:  Patient S&E at bedside. No o/n events, patient resting comfortably. Reports only 1 loose BM this AM, and overall feeling better today.    VITAL SIGNS:  T(F): 97.5 (10-16-24 @ 05:25)  HR: 60 (10-16-24 @ 05:25)  BP: 105/64 (10-16-24 @ 05:25)  RR: 18 (10-16-24 @ 05:25)  SpO2: 98% (10-16-24 @ 05:25)  Wt(kg): --    PHYSICAL EXAM:    Constitutional: NAD  Eyes: EOMI, sclera non-icteric  Neck: supple, no masses, no JVD  Respiratory: CTA b/l, good air entry b/l  Cardiovascular: RRR, no M/R/G  Gastrointestinal: soft, NTND, no masses palpable, + BS  Extremities: no c/c/e  Neurological: AAOx3      MEDICATIONS  (STANDING):  azithromycin   Tablet 500 milliGRAM(s) Oral daily  sodium chloride 0.9%. 1000 milliLiter(s) (100 mL/Hr) IV Continuous <Continuous>    MEDICATIONS  (PRN):  acetaminophen     Tablet .. 650 milliGRAM(s) Oral every 6 hours PRN Temp greater or equal to 38C (100.4F), Mild Pain (1 - 3)  aluminum hydroxide/magnesium hydroxide/simethicone Suspension 30 milliLiter(s) Oral every 4 hours PRN Dyspepsia  melatonin 3 milliGRAM(s) Oral at bedtime PRN Insomnia  ondansetron Injectable 4 milliGRAM(s) IV Push every 8 hours PRN Nausea and/or Vomiting      Allergies    No Known Allergies    Intolerances        LABS:                        11.2   2.84  )-----------( 74       ( 16 Oct 2024 07:03 )             34.3     10-16    140  |  103  |  9   ----------------------------<  85  3.7   |  22  |  0.86    Ca    9.3      16 Oct 2024 07:05  Mg     2.0     10-14    TPro  6.5  /  Alb  3.8  /  TBili  1.2  /  DBili  x   /  AST  18  /  ALT  33  /  AlkPhos  67  10-15    PT/INR - ( 15 Oct 2024 06:35 )   PT: 14.1 sec;   INR: 1.24 ratio         PTT - ( 14 Oct 2024 17:14 )  PTT:36.8 sec  Urinalysis Basic - ( 16 Oct 2024 07:05 )    Color: x / Appearance: x / SG: x / pH: x  Gluc: 85 mg/dL / Ketone: x  / Bili: x / Urobili: x   Blood: x / Protein: x / Nitrite: x   Leuk Esterase: x / RBC: x / WBC x   Sq Epi: x / Non Sq Epi: x / Bacteria: x        RADIOLOGY & ADDITIONAL TESTS:  Studies reviewed.    ASSESSMENT & PLAN:

## 2024-10-16 NOTE — DISCHARGE NOTE PROVIDER - NSFOLLOWUPCLINICS_GEN_ALL_ED_FT
[facility] - Primary Care  Primary Care  [address] [place] [address]  Phone: [phone]  Fax:   Follow Up Time: 2 weeks

## 2024-10-16 NOTE — DISCHARGE NOTE NURSING/CASE MANAGEMENT/SOCIAL WORK - NSDCPEFALRISK_GEN_ALL_CORE
For information on Fall & Injury Prevention, visit: [email or web address] OR  [email or web address] OR  https://www.cdc.gov/steadi/patient.html

## 2024-10-16 NOTE — DISCHARGE NOTE NURSING/CASE MANAGEMENT/SOCIAL WORK - FINANCIAL ASSISTANCE
[facility] provides services at a reduced cost to those who are determined to be eligible through [facility]’s financial assistance program. Information regarding [facility]’s financial assistance program can be found by going to [email or web address] or by calling [phone].

## 2024-10-16 NOTE — DISCHARGE NOTE PROVIDER - HOSPITAL COURSE
HPI:  29M pmh CML on p.o. chemo presenting with lower abdominal pain.  Pain began yesterday to the lower abdomen s/p eating Mexican food (buritto). Pain was temporarily relived w/defecation (non bloody diarrhea) but returned after apx 15 minutes later. Report apx 6 watery BM yesterday and about 4 episodes today. Sister and girlfriend also ate from same place, they developed diarrhea afterward but are currently feeling fine .  Denies associated urinary symptoms or testicular pain, or fever.     ROS: Denies HA, CP, SOB, palpitation, N/V, fever, cough, chills, dizziness, recent travel, sick contact, change in urinary habits   A 10-system ROS was performed and is negative except as noted above and/or in the HPI.    ED: 3.3L IVF, IV zosyn. CT A/P showing colitis (14 Oct 2024 23:22)      Hospital Course:        Important/Active Issues for Follow-Up:  Acute colitis- PCP  CML- Heme/onc      Medication Changes and Reason:  Hold Dasatinib while you are on antibiotics, having diarrhea/abdominal pain symptoms, or instructed to resume at your outpatient hematology/oncology follow up with Dr. Finley.      Advance Directives  [x] Full code [ ] Hospice [ ] DNR    Discharge Diagnoses:  Acute colitis  CML   HPI:  29M pmh CML on p.o. chemo presenting with lower abdominal pain.  Pain began yesterday to the lower abdomen s/p eating Mexican food (buritto). Pain was temporarily relived w/defecation (non bloody diarrhea) but returned after apx 15 minutes later. Report apx 6 watery BM yesterday and about 4 episodes today. Sister and girlfriend also ate from same place, they developed diarrhea afterward but are currently feeling fine .  Denies associated urinary symptoms or testicular pain, or fever.     ROS: Denies HA, CP, SOB, palpitation, N/V, fever, cough, chills, dizziness, recent travel, sick contact, change in urinary habits   A 10-system ROS was performed and is negative except as noted above and/or in the HPI.    ED: 3.3L IVF, IV zosyn. CT A/P showing colitis (14 Oct 2024 23:22)      Hospital Course:  Heme/onc was consulted. Dasatinib on hold due to acute infection, plan to resume after completion of antibiotics or as advised by Dr. Mendiola. Infectious Disease was consulted. GI PCR positive for Campylobacter and EPEC      Important/Active Issues for Follow-Up:  Acute colitis- PCP  CML- Heme/onc      Medication Changes and Reason:  Hold Dasatinib while you are on antibiotics, having diarrhea/abdominal pain symptoms, or instructed to resume at your outpatient hematology/oncology follow up with Dr. Finley.      Advance Directives  [x] Full code [ ] Hospice [ ] DNR    Discharge Diagnoses:  Acute colitis  CML   HPI:  29M pmh CML on p.o. chemo presenting with lower abdominal pain.  Pain began yesterday to the lower abdomen s/p eating Mexican food (buritto). Pain was temporarily relived w/defecation (non bloody diarrhea) but returned after apx 15 minutes later. Report apx 6 watery BM yesterday and about 4 episodes today. Sister and girlfriend also ate from same place, they developed diarrhea afterward but are currently feeling fine .  Denies associated urinary symptoms or testicular pain, or fever.     ROS: Denies HA, CP, SOB, palpitation, N/V, fever, cough, chills, dizziness, recent travel, sick contact, change in urinary habits   A 10-system ROS was performed and is negative except as noted above and/or in the HPI.    ED: 3.3L IVF, IV zosyn. CT A/P showing colitis (14 Oct 2024 23:22)      Hospital Course:  Heme/onc was consulted. Dasatinib on hold due to acute infection, plan to resume after completion of antibiotics or as advised by Dr. Mendiola. Infectious Disease was consulted. GI PCR positive for Campylobacter and EPEC. Patient received 2 doses of IV Zosyn empirically, also received and will complete a 3 day oral course tomorrow after discharge. Symptoms improved, afebrile by the time of discharge, tolerating diet. Discharge plan with medication reconciliation discussed with attending Dr. Cruz. Patient medically cleared for discharge home with outpatient onc/primary care follow ups      Important/Active Issues for Follow-Up:  Acute colitis- PCP  CML- Heme/onc      Medication Changes and Reason:  Hold Dasatinib while you are on antibiotics, having diarrhea/abdominal pain symptoms, or instructed to resume at your outpatient hematology/oncology follow up with Dr. Finley.  Oral azithromycin added for infectious colitis    Advance Directives  [x] Full code [ ] Hospice [ ] DNR    Discharge Diagnoses:  Acute infectious colitis  CML   HPI:  29M pmh CML on p.o. chemo presenting with lower abdominal pain.  Pain began yesterday to the lower abdomen s/p eating Mexican food (buritto). Pain was temporarily relived w/defecation (non bloody diarrhea) but returned after apx 15 minutes later. Report apx 6 watery BM yesterday and about 4 episodes today. Sister and girlfriend also ate from same place, they developed diarrhea afterward but are currently feeling fine .  Denies associated urinary symptoms or testicular pain, or fever.     ROS: Denies HA, CP, SOB, palpitation, N/V, fever, cough, chills, dizziness, recent travel, sick contact, change in urinary habits   A 10-system ROS was performed and is negative except as noted above and/or in the HPI.    ED: 3.3L IVF, IV zosyn. CT A/P showing colitis (14 Oct 2024 23:22)      Hospital Course:  Heme/onc was consulted. Dasatinib on hold due to acute infection, plan to resume after completion of antibiotics or as advised by Dr. Mendiola. Infectious Disease was consulted. GI PCR positive for Campylobacter and EPEC. Patient received 2 doses of IV Zosyn empirically, also received and will complete a 3 day oral course tomorrow after discharge. Symptoms improved, afebrile by the time of discharge, tolerating diet. Discharge plan with medication reconciliation discussed with attending Dr. Cruz. Patient medically cleared for discharge home with outpatient onc/primary care follow ups      Important/Active Issues for Follow-Up:  - PCP  CML- Heme/onc      Medication Changes and Reason:  Hold Dasatinib while you are on antibiotics, having diarrhea/abdominal pain symptoms, or instructed to resume at your outpatient hematology/oncology follow up with Dr. Finley.  Oral azithromycin added for infectious colitis    Advance Directives  [x] Full code [ ] Hospice [ ] DNR    Discharge Diagnoses:  Acute infectious colitis 2/2 to campylobacter and EPEC  CML

## 2024-10-16 NOTE — DISCHARGE NOTE PROVIDER - CARE PROVIDER_API CALL
Melissa Mendiola  Medical Oncology  [address] [place] [id number]  Phone: [phone]  Fax: [phone]  Established Patient  Follow Up Time: 1-3 days

## 2024-10-16 NOTE — DISCHARGE NOTE PROVIDER - ATTENDING DISCHARGE PHYSICAL EXAMINATION:
Vital Signs Last 24 Hrs  T(C): 36.3 (16 Oct 2024 14:21), Max: 36.7 (15 Oct 2024 20:23)  T(F): 97.4 (16 Oct 2024 14:21), Max: 98.1 (15 Oct 2024 20:23)  HR: 73 (16 Oct 2024 14:21) (60 - 75)  BP: 110/68 (16 Oct 2024 14:21) (104/65 - 110/68)  BP(mean): --  RR: 18 (16 Oct 2024 14:21) (18 - 18)  SpO2: 96% (16 Oct 2024 14:21) (96% - 98%)    Parameters below as of 16 Oct 2024 14:21  Patient On (Oxygen Delivery Method): room air        CONSTITUTIONAL: Well-groomed, in no apparent distress  NECK: Trachea midline   RESPIRATORY: Breathing comfortably; lungs CTA without wheeze/rhonchi/rales  CARDIOVASCULAR: +S1S2, RRR  GASTROINTESTINAL: + mild tenderness to palpation  SKIN: No rashes   PSYCHIATRIC: A+O x 3

## 2024-10-16 NOTE — DISCHARGE NOTE NURSING/CASE MANAGEMENT/SOCIAL WORK - NSDCFUADDAPPT_GEN_ALL_CORE_FT
APPTS ARE READY TO BE MADE: [x ] YES    Best Family or Patient Contact (if needed):    Additional Information about above appointments (if needed):    1: Hematology/Oncology Dr. Mendiola within 1 week of discharge  2: Primary Care 1-2 weeks after discharge  3:     Other comments or requests:

## 2024-10-16 NOTE — DISCHARGE NOTE PROVIDER - NSDCCPCAREPLAN_GEN_ALL_CORE_FT
PRINCIPAL DISCHARGE DIAGNOSIS  Diagnosis: Acute colitis  Assessment and Plan of Treatment: GI PCR of your stool was positive for Campylobacter and ETEC (a strain of E.coli). You were treated with IV antibiotics (Zosyn) and oral antibiotics (Azithromycin). You are being discharged with 1 more dose of Azithromycin to take tomorrow 10/17/24 and then you will complete a total 3 day course.  HOME CARE INSTRUCTIONS  Get plenty of rest.  Drink enough water and fluids to keep your urine clear or pale yellow.  Eat a well-balanced diet.  Call your caregiver for follow-up as recommended.  SEEK IMMEDIATE MEDICAL CARE IF:  You develop chills.  You have an oral temperature above 102F not controlled by medicine.  You have extreme weakness, fainting, or dehydration.  You have repeated vomiting.  You develop severe belly (abdominal) pain or are passing bloody or tarry stools      SECONDARY DISCHARGE DIAGNOSES  Diagnosis: CML (chronic myeloid leukemia)  Assessment and Plan of Treatment: Hold Dasatinib while you have an acute infection, you can resume after completion of antibiotics or as advised by Dr. Mendiola. Please follow up with Dr. Mendiola within 1 week of discharge to discuss resuming this medication. Follow up with Primary Care.  HOME CARE INSTRUCTIONS  Take all medications exactly as directed. Only take over-the-counter or prescription medicines for pain, discomfort or fever as directed by your caregiver  Although some of your treatments might affect your appetite, try to eat regular, healthy meals.  If you develop any side effects, tell your caregiver. He or she may have recommendations of things you can do to improve symptoms.  Consider learning some ways to cope with the stress of having a chronic illness, such as yoga, meditation, or participating in a support group.  SEEK IMMEDIATE MEDICAL CARE IF YOU:  Develop chest pains.  Feel short of breath.  Feel light-headed or pass out.  Notice pain, swelling or redness anywhere in your legs.  Have uncontrollable bleeding, such as a nosebleed that will not stop.  Are unable to stop throwing up (vomiting).  Cannot keep liquids down.

## 2024-10-16 NOTE — DISCHARGE NOTE NURSING/CASE MANAGEMENT/SOCIAL WORK - PATIENT PORTAL LINK FT
You can access the FollowMyHealth Patient Portal offered by [facility] by registering at the following website: [email or web address]. By joining [facility]’s FollowMyHealth portal, you will also be able to view your health information using other applications (apps) compatible with our system.

## 2024-10-17 LAB
CULTURE RESULTS: ABNORMAL
SPECIMEN SOURCE: SIGNIFICANT CHANGE UP

## 2024-10-17 RX ORDER — AZITHROMYCIN 250 MG/1
1 TABLET, FILM COATED ORAL
Qty: 1 | Refills: 0
Start: 2024-10-17 | End: 2024-10-17

## 2024-10-17 NOTE — CHART NOTE - NSCHARTNOTEFT_GEN_A_CORE
Lab called with Stool culture growing Campylobacter. Pt received 2 days of Azithromycin in the hospital and was discharged on one extra dose for Azithromycin appropriate for treatment of campylobacter

## 2024-10-19 LAB
CULTURE RESULTS: SIGNIFICANT CHANGE UP
CULTURE RESULTS: SIGNIFICANT CHANGE UP
SPECIMEN SOURCE: SIGNIFICANT CHANGE UP
SPECIMEN SOURCE: SIGNIFICANT CHANGE UP

## 2024-10-23 ENCOUNTER — APPOINTMENT (OUTPATIENT)
Dept: OTOLARYNGOLOGY | Facility: CLINIC | Age: 29
End: 2024-10-23

## 2024-10-30 ENCOUNTER — RESULT REVIEW (OUTPATIENT)
Age: 29
End: 2024-10-30

## 2024-10-30 ENCOUNTER — APPOINTMENT (OUTPATIENT)
Dept: HEMATOLOGY ONCOLOGY | Facility: CLINIC | Age: 29
End: 2024-10-30
Payer: COMMERCIAL

## 2024-10-30 VITALS
BODY MASS INDEX: 24.5 KG/M2 | TEMPERATURE: 98.4 F | RESPIRATION RATE: 16 BRPM | HEART RATE: 70 BPM | OXYGEN SATURATION: 97 % | DIASTOLIC BLOOD PRESSURE: 81 MMHG | WEIGHT: 161.16 LBS | SYSTOLIC BLOOD PRESSURE: 132 MMHG

## 2024-10-30 DIAGNOSIS — C92.10 CHRONIC MYELOID LEUKEMIA, BCR/ABL-POSITIVE, NOT HAVING ACHIEVED REMISSION: ICD-10-CM

## 2024-10-30 LAB
BASOPHILS # BLD AUTO: 0.08 K/UL — SIGNIFICANT CHANGE UP (ref 0–0.2)
BASOPHILS NFR BLD AUTO: 1.2 % — SIGNIFICANT CHANGE UP (ref 0–2)
EOSINOPHIL # BLD AUTO: 0.23 K/UL — SIGNIFICANT CHANGE UP (ref 0–0.5)
EOSINOPHIL NFR BLD AUTO: 3.3 % — SIGNIFICANT CHANGE UP (ref 0–6)
HCT VFR BLD CALC: 40.6 % — SIGNIFICANT CHANGE UP (ref 39–50)
HGB BLD-MCNC: 13.3 G/DL — SIGNIFICANT CHANGE UP (ref 13–17)
IMM GRANULOCYTES NFR BLD AUTO: 0.1 % — SIGNIFICANT CHANGE UP (ref 0–0.9)
LYMPHOCYTES # BLD AUTO: 3.7 K/UL — HIGH (ref 1–3.3)
LYMPHOCYTES # BLD AUTO: 53.5 % — HIGH (ref 13–44)
MCHC RBC-ENTMCNC: 28.2 PG — SIGNIFICANT CHANGE UP (ref 27–34)
MCHC RBC-ENTMCNC: 32.8 G/DL — SIGNIFICANT CHANGE UP (ref 32–36)
MCV RBC AUTO: 86 FL — SIGNIFICANT CHANGE UP (ref 80–100)
MONOCYTES # BLD AUTO: 0.28 K/UL — SIGNIFICANT CHANGE UP (ref 0–0.9)
MONOCYTES NFR BLD AUTO: 4.1 % — SIGNIFICANT CHANGE UP (ref 2–14)
NEUTROPHILS # BLD AUTO: 2.61 K/UL — SIGNIFICANT CHANGE UP (ref 1.8–7.4)
NEUTROPHILS NFR BLD AUTO: 37.8 % — LOW (ref 43–77)
NRBC # BLD: 0 /100 WBCS — SIGNIFICANT CHANGE UP (ref 0–0)
PLATELET # BLD AUTO: 152 K/UL — SIGNIFICANT CHANGE UP (ref 150–400)
RBC # BLD: 4.72 M/UL — SIGNIFICANT CHANGE UP (ref 4.2–5.8)
RBC # FLD: 16.5 % — HIGH (ref 10.3–14.5)
WBC # BLD: 6.91 K/UL — SIGNIFICANT CHANGE UP (ref 3.8–10.5)
WBC # FLD AUTO: 6.91 K/UL — SIGNIFICANT CHANGE UP (ref 3.8–10.5)

## 2024-10-30 PROCEDURE — 99214 OFFICE O/P EST MOD 30 MIN: CPT

## 2024-10-30 PROCEDURE — G2211 COMPLEX E/M VISIT ADD ON: CPT

## 2024-11-05 LAB
ALBUMIN SERPL ELPH-MCNC: 5 G/DL
ALP BLD-CCNC: 82 U/L
ALT SERPL-CCNC: 87 U/L
ANION GAP SERPL CALC-SCNC: 12 MMOL/L
AST SERPL-CCNC: 44 U/L
BILIRUB SERPL-MCNC: 1.5 MG/DL
BUN SERPL-MCNC: 17 MG/DL
CALCIUM SERPL-MCNC: 10 MG/DL
CHLORIDE SERPL-SCNC: 103 MMOL/L
CO2 SERPL-SCNC: 26 MMOL/L
CREAT SERPL-MCNC: 0.85 MG/DL
EGFR: 121 ML/MIN/1.73M2
GLUCOSE SERPL-MCNC: 95 MG/DL
POTASSIUM SERPL-SCNC: 4.1 MMOL/L
PROT SERPL-MCNC: 7.9 G/DL
SODIUM SERPL-SCNC: 142 MMOL/L

## 2024-11-07 LAB — T(9;22)(ABL1,BCR)/CONTROL BLD/T: NORMAL

## 2024-11-20 ENCOUNTER — RESULT REVIEW (OUTPATIENT)
Age: 29
End: 2024-11-20

## 2024-11-20 ENCOUNTER — APPOINTMENT (OUTPATIENT)
Dept: HEMATOLOGY ONCOLOGY | Facility: CLINIC | Age: 29
End: 2024-11-20
Payer: COMMERCIAL

## 2024-11-20 VITALS
DIASTOLIC BLOOD PRESSURE: 67 MMHG | SYSTOLIC BLOOD PRESSURE: 111 MMHG | TEMPERATURE: 98.4 F | OXYGEN SATURATION: 98 % | WEIGHT: 164.46 LBS | RESPIRATION RATE: 16 BRPM | BODY MASS INDEX: 25.01 KG/M2 | HEART RATE: 75 BPM

## 2024-11-20 PROBLEM — C92.10 CHRONIC MYELOID LEUKEMIA, BCR/ABL-POSITIVE, NOT HAVING ACHIEVED REMISSION: Chronic | Status: ACTIVE | Noted: 2024-10-14

## 2024-11-20 LAB
BASOPHILS # BLD AUTO: 0.02 K/UL — SIGNIFICANT CHANGE UP (ref 0–0.2)
BASOPHILS NFR BLD AUTO: 0.4 % — SIGNIFICANT CHANGE UP (ref 0–2)
EOSINOPHIL # BLD AUTO: 0.2 K/UL — SIGNIFICANT CHANGE UP (ref 0–0.5)
EOSINOPHIL NFR BLD AUTO: 4.2 % — SIGNIFICANT CHANGE UP (ref 0–6)
HCT VFR BLD CALC: 39.9 % — SIGNIFICANT CHANGE UP (ref 39–50)
HGB BLD-MCNC: 13.4 G/DL — SIGNIFICANT CHANGE UP (ref 13–17)
IMM GRANULOCYTES NFR BLD AUTO: 0.2 % — SIGNIFICANT CHANGE UP (ref 0–0.9)
LYMPHOCYTES # BLD AUTO: 2.19 K/UL — SIGNIFICANT CHANGE UP (ref 1–3.3)
LYMPHOCYTES # BLD AUTO: 46.4 % — HIGH (ref 13–44)
MCHC RBC-ENTMCNC: 28.3 PG — SIGNIFICANT CHANGE UP (ref 27–34)
MCHC RBC-ENTMCNC: 33.6 G/DL — SIGNIFICANT CHANGE UP (ref 32–36)
MCV RBC AUTO: 84.2 FL — SIGNIFICANT CHANGE UP (ref 80–100)
MONOCYTES # BLD AUTO: 0.3 K/UL — SIGNIFICANT CHANGE UP (ref 0–0.9)
MONOCYTES NFR BLD AUTO: 6.4 % — SIGNIFICANT CHANGE UP (ref 2–14)
NEUTROPHILS # BLD AUTO: 2 K/UL — SIGNIFICANT CHANGE UP (ref 1.8–7.4)
NEUTROPHILS NFR BLD AUTO: 42.4 % — LOW (ref 43–77)
NRBC # BLD: 0 /100 WBCS — SIGNIFICANT CHANGE UP (ref 0–0)
PLATELET # BLD AUTO: 112 K/UL — LOW (ref 150–400)
RBC # BLD: 4.74 M/UL — SIGNIFICANT CHANGE UP (ref 4.2–5.8)
RBC # FLD: 17.2 % — HIGH (ref 10.3–14.5)
WBC # BLD: 4.72 K/UL — SIGNIFICANT CHANGE UP (ref 3.8–10.5)
WBC # FLD AUTO: 4.72 K/UL — SIGNIFICANT CHANGE UP (ref 3.8–10.5)

## 2024-11-20 PROCEDURE — 99214 OFFICE O/P EST MOD 30 MIN: CPT

## 2024-11-20 PROCEDURE — G2211 COMPLEX E/M VISIT ADD ON: CPT

## 2024-11-25 LAB
ALBUMIN SERPL ELPH-MCNC: 5.2 G/DL
ALP BLD-CCNC: 84 U/L
ALT SERPL-CCNC: 84 U/L
ANION GAP SERPL CALC-SCNC: 14 MMOL/L
AST SERPL-CCNC: 42 U/L
BILIRUB SERPL-MCNC: 1.1 MG/DL
BUN SERPL-MCNC: 19 MG/DL
CALCIUM SERPL-MCNC: 10 MG/DL
CHLORIDE SERPL-SCNC: 99 MMOL/L
CO2 SERPL-SCNC: 25 MMOL/L
CREAT SERPL-MCNC: 0.85 MG/DL
EGFR: 121 ML/MIN/1.73M2
GLUCOSE SERPL-MCNC: 99 MG/DL
POTASSIUM SERPL-SCNC: 3.9 MMOL/L
PROT SERPL-MCNC: 8.2 G/DL
SODIUM SERPL-SCNC: 138 MMOL/L
T(9;22)(ABL1,BCR)/CONTROL BLD/T: NORMAL

## 2024-11-26 ENCOUNTER — APPOINTMENT (OUTPATIENT)
Dept: FAMILY MEDICINE | Facility: CLINIC | Age: 29
End: 2024-11-26
Payer: COMMERCIAL

## 2024-11-26 VITALS
DIASTOLIC BLOOD PRESSURE: 77 MMHG | OXYGEN SATURATION: 97 % | HEIGHT: 68 IN | TEMPERATURE: 98.1 F | WEIGHT: 165 LBS | SYSTOLIC BLOOD PRESSURE: 120 MMHG | HEART RATE: 82 BPM | RESPIRATION RATE: 16 BRPM | BODY MASS INDEX: 25.01 KG/M2

## 2024-11-26 DIAGNOSIS — D18.00 HEMANGIOMA UNSPECIFIED SITE: ICD-10-CM

## 2024-11-26 DIAGNOSIS — C92.10 CHRONIC MYELOID LEUKEMIA, BCR/ABL-POSITIVE, NOT HAVING ACHIEVED REMISSION: ICD-10-CM

## 2024-11-26 PROCEDURE — G2211 COMPLEX E/M VISIT ADD ON: CPT

## 2024-11-26 PROCEDURE — 99213 OFFICE O/P EST LOW 20 MIN: CPT

## 2024-12-05 ENCOUNTER — OUTPATIENT (OUTPATIENT)
Dept: OUTPATIENT SERVICES | Facility: HOSPITAL | Age: 29
LOS: 1 days | Discharge: HOME | End: 2024-12-05

## 2024-12-05 DIAGNOSIS — D64.9 ANEMIA, UNSPECIFIED: ICD-10-CM

## 2024-12-16 ENCOUNTER — RESULT REVIEW (OUTPATIENT)
Age: 29
End: 2024-12-16

## 2024-12-16 ENCOUNTER — LABORATORY RESULT (OUTPATIENT)
Age: 29
End: 2024-12-16

## 2024-12-16 ENCOUNTER — APPOINTMENT (OUTPATIENT)
Dept: HEMATOLOGY ONCOLOGY | Facility: CLINIC | Age: 29
End: 2024-12-16
Payer: COMMERCIAL

## 2024-12-16 VITALS
DIASTOLIC BLOOD PRESSURE: 74 MMHG | BODY MASS INDEX: 25.14 KG/M2 | OXYGEN SATURATION: 98 % | RESPIRATION RATE: 16 BRPM | WEIGHT: 165.32 LBS | SYSTOLIC BLOOD PRESSURE: 121 MMHG | HEART RATE: 102 BPM | TEMPERATURE: 98.2 F

## 2024-12-16 DIAGNOSIS — C92.10 CHRONIC MYELOID LEUKEMIA, BCR/ABL-POSITIVE, NOT HAVING ACHIEVED REMISSION: ICD-10-CM

## 2024-12-16 LAB
BASOPHILS # BLD AUTO: 0.01 K/UL — SIGNIFICANT CHANGE UP (ref 0–0.2)
BASOPHILS NFR BLD AUTO: 0.2 % — SIGNIFICANT CHANGE UP (ref 0–2)
EOSINOPHIL # BLD AUTO: 0.12 K/UL — SIGNIFICANT CHANGE UP (ref 0–0.5)
EOSINOPHIL NFR BLD AUTO: 2.3 % — SIGNIFICANT CHANGE UP (ref 0–6)
HCT VFR BLD CALC: 35.7 % — LOW (ref 39–50)
HGB BLD-MCNC: 12.3 G/DL — LOW (ref 13–17)
IMM GRANULOCYTES NFR BLD AUTO: 0.2 % — SIGNIFICANT CHANGE UP (ref 0–0.9)
LYMPHOCYTES # BLD AUTO: 2.76 K/UL — SIGNIFICANT CHANGE UP (ref 1–3.3)
LYMPHOCYTES # BLD AUTO: 53.4 % — HIGH (ref 13–44)
MCHC RBC-ENTMCNC: 29.1 PG — SIGNIFICANT CHANGE UP (ref 27–34)
MCHC RBC-ENTMCNC: 34.5 G/DL — SIGNIFICANT CHANGE UP (ref 32–36)
MCV RBC AUTO: 84.4 FL — SIGNIFICANT CHANGE UP (ref 80–100)
MONOCYTES # BLD AUTO: 0.44 K/UL — SIGNIFICANT CHANGE UP (ref 0–0.9)
MONOCYTES NFR BLD AUTO: 8.5 % — SIGNIFICANT CHANGE UP (ref 2–14)
NEUTROPHILS # BLD AUTO: 1.83 K/UL — SIGNIFICANT CHANGE UP (ref 1.8–7.4)
NEUTROPHILS NFR BLD AUTO: 35.4 % — LOW (ref 43–77)
NRBC # BLD: 0 /100 WBCS — SIGNIFICANT CHANGE UP (ref 0–0)
PLATELET # BLD AUTO: 72 K/UL — LOW (ref 150–400)
RBC # BLD: 4.23 M/UL — SIGNIFICANT CHANGE UP (ref 4.2–5.8)
RBC # FLD: 17.4 % — HIGH (ref 10.3–14.5)
WBC # BLD: 5.17 K/UL — SIGNIFICANT CHANGE UP (ref 3.8–10.5)
WBC # FLD AUTO: 5.17 K/UL — SIGNIFICANT CHANGE UP (ref 3.8–10.5)

## 2024-12-16 PROCEDURE — 99214 OFFICE O/P EST MOD 30 MIN: CPT

## 2024-12-16 PROCEDURE — G2211 COMPLEX E/M VISIT ADD ON: CPT

## 2024-12-26 LAB
ALBUMIN SERPL ELPH-MCNC: 4.6 G/DL
ALP BLD-CCNC: 97 U/L
ALT SERPL-CCNC: 85 U/L
ANION GAP SERPL CALC-SCNC: 13 MMOL/L
AST SERPL-CCNC: 51 U/L
BILIRUB SERPL-MCNC: 1.1 MG/DL
BUN SERPL-MCNC: 18 MG/DL
CALCIUM SERPL-MCNC: 9.1 MG/DL
CHLORIDE SERPL-SCNC: 103 MMOL/L
CO2 SERPL-SCNC: 24 MMOL/L
CREAT SERPL-MCNC: 1.03 MG/DL
EGFR: 101 ML/MIN/1.73M2
GLUCOSE SERPL-MCNC: 104 MG/DL
POTASSIUM SERPL-SCNC: 3.9 MMOL/L
PROT SERPL-MCNC: 7.5 G/DL
SODIUM SERPL-SCNC: 140 MMOL/L
T(9;22)(ABL1,BCR)/CONTROL BLD/T: NORMAL

## 2024-12-27 RX ORDER — DASATINIB 100 MG/1
100 TABLET ORAL
Qty: 30 | Refills: 2 | Status: ACTIVE | COMMUNITY
Start: 2024-12-27 | End: 1900-01-01

## 2025-01-30 ENCOUNTER — APPOINTMENT (OUTPATIENT)
Dept: HEMATOLOGY ONCOLOGY | Facility: CLINIC | Age: 30
End: 2025-01-30
Payer: SELF-PAY

## 2025-01-30 ENCOUNTER — RESULT REVIEW (OUTPATIENT)
Age: 30
End: 2025-01-30

## 2025-01-30 ENCOUNTER — NON-APPOINTMENT (OUTPATIENT)
Age: 30
End: 2025-01-30

## 2025-01-30 ENCOUNTER — OUTPATIENT (OUTPATIENT)
Dept: OUTPATIENT SERVICES | Facility: HOSPITAL | Age: 30
LOS: 1 days | Discharge: HOME | End: 2025-01-30

## 2025-01-30 VITALS
OXYGEN SATURATION: 99 % | SYSTOLIC BLOOD PRESSURE: 115 MMHG | BODY MASS INDEX: 26.25 KG/M2 | WEIGHT: 172.62 LBS | TEMPERATURE: 97.2 F | DIASTOLIC BLOOD PRESSURE: 72 MMHG | RESPIRATION RATE: 16 BRPM | HEART RATE: 74 BPM

## 2025-01-30 DIAGNOSIS — D64.9 ANEMIA, UNSPECIFIED: ICD-10-CM

## 2025-01-30 DIAGNOSIS — C92.10 CHRONIC MYELOID LEUKEMIA, BCR/ABL-POSITIVE, NOT HAVING ACHIEVED REMISSION: ICD-10-CM

## 2025-01-30 LAB
BASOPHILS # BLD AUTO: 0.02 K/UL — SIGNIFICANT CHANGE UP (ref 0–0.2)
BASOPHILS NFR BLD AUTO: 0.3 % — SIGNIFICANT CHANGE UP (ref 0–2)
EOSINOPHIL # BLD AUTO: 0.25 K/UL — SIGNIFICANT CHANGE UP (ref 0–0.5)
EOSINOPHIL NFR BLD AUTO: 3.8 % — SIGNIFICANT CHANGE UP (ref 0–6)
HCT VFR BLD CALC: 34.9 % — LOW (ref 39–50)
HGB BLD-MCNC: 12 G/DL — LOW (ref 13–17)
IMM GRANULOCYTES NFR BLD AUTO: 0.2 % — SIGNIFICANT CHANGE UP (ref 0–0.9)
LYMPHOCYTES # BLD AUTO: 3.73 K/UL — HIGH (ref 1–3.3)
LYMPHOCYTES # BLD AUTO: 56.3 % — HIGH (ref 13–44)
MCHC RBC-ENTMCNC: 30.6 PG — SIGNIFICANT CHANGE UP (ref 27–34)
MCHC RBC-ENTMCNC: 34.4 G/DL — SIGNIFICANT CHANGE UP (ref 32–36)
MCV RBC AUTO: 89 FL — SIGNIFICANT CHANGE UP (ref 80–100)
MONOCYTES # BLD AUTO: 0.48 K/UL — SIGNIFICANT CHANGE UP (ref 0–0.9)
MONOCYTES NFR BLD AUTO: 7.2 % — SIGNIFICANT CHANGE UP (ref 2–14)
NEUTROPHILS # BLD AUTO: 2.14 K/UL — SIGNIFICANT CHANGE UP (ref 1.8–7.4)
NEUTROPHILS NFR BLD AUTO: 32.2 % — LOW (ref 43–77)
NRBC # BLD: 0 /100 WBCS — SIGNIFICANT CHANGE UP (ref 0–0)
NRBC BLD-RTO: 0 /100 WBCS — SIGNIFICANT CHANGE UP (ref 0–0)
PLATELET # BLD AUTO: 84 K/UL — LOW (ref 150–400)
RBC # BLD: 3.92 M/UL — LOW (ref 4.2–5.8)
RBC # FLD: 16.9 % — HIGH (ref 10.3–14.5)
WBC # BLD: 6.63 K/UL — SIGNIFICANT CHANGE UP (ref 3.8–10.5)
WBC # FLD AUTO: 6.63 K/UL — SIGNIFICANT CHANGE UP (ref 3.8–10.5)

## 2025-01-30 PROCEDURE — G2211 COMPLEX E/M VISIT ADD ON: CPT

## 2025-01-30 PROCEDURE — 99214 OFFICE O/P EST MOD 30 MIN: CPT

## 2025-01-31 LAB
ALBUMIN SERPL ELPH-MCNC: 4.7 G/DL
ALP BLD-CCNC: 92 U/L
ALT SERPL-CCNC: 53 U/L
ANION GAP SERPL CALC-SCNC: 13 MMOL/L
AST SERPL-CCNC: 38 U/L
BILIRUB SERPL-MCNC: 0.7 MG/DL
BUN SERPL-MCNC: 20 MG/DL
CALCIUM SERPL-MCNC: 9.4 MG/DL
CHLORIDE SERPL-SCNC: 104 MMOL/L
CO2 SERPL-SCNC: 22 MMOL/L
CREAT SERPL-MCNC: 0.84 MG/DL
EGFR: 121 ML/MIN/1.73M2
GLUCOSE SERPL-MCNC: 99 MG/DL
POTASSIUM SERPL-SCNC: 3.8 MMOL/L
PROT SERPL-MCNC: 7.5 G/DL
SODIUM SERPL-SCNC: 139 MMOL/L

## 2025-02-04 ENCOUNTER — OUTPATIENT (OUTPATIENT)
Dept: OUTPATIENT SERVICES | Facility: HOSPITAL | Age: 30
LOS: 1 days | Discharge: HOME | End: 2025-02-04

## 2025-02-04 DIAGNOSIS — D64.9 ANEMIA, UNSPECIFIED: ICD-10-CM

## 2025-02-04 LAB — T(9;22)(ABL1,BCR)/CONTROL BLD/T: NORMAL

## 2025-02-07 RX ORDER — DASATINIB 100 MG/1
100 TABLET ORAL
Qty: 30 | Refills: 5 | Status: ACTIVE | COMMUNITY
Start: 2025-01-31 | End: 1900-01-01

## 2025-02-11 ENCOUNTER — APPOINTMENT (OUTPATIENT)
Dept: FAMILY MEDICINE | Facility: CLINIC | Age: 30
End: 2025-02-11
Payer: COMMERCIAL

## 2025-02-11 VITALS
WEIGHT: 173 LBS | TEMPERATURE: 97.8 F | SYSTOLIC BLOOD PRESSURE: 110 MMHG | HEART RATE: 69 BPM | DIASTOLIC BLOOD PRESSURE: 61 MMHG | OXYGEN SATURATION: 97 % | RESPIRATION RATE: 16 BRPM | BODY MASS INDEX: 26.22 KG/M2 | HEIGHT: 68 IN

## 2025-02-11 DIAGNOSIS — J45.20 MILD INTERMITTENT ASTHMA, UNCOMPLICATED: ICD-10-CM

## 2025-02-11 DIAGNOSIS — C92.10 CHRONIC MYELOID LEUKEMIA, BCR/ABL-POSITIVE, NOT HAVING ACHIEVED REMISSION: ICD-10-CM

## 2025-02-11 PROCEDURE — 99213 OFFICE O/P EST LOW 20 MIN: CPT

## 2025-02-11 PROCEDURE — G2211 COMPLEX E/M VISIT ADD ON: CPT

## 2025-02-28 ENCOUNTER — APPOINTMENT (OUTPATIENT)
Dept: HEMATOLOGY ONCOLOGY | Facility: CLINIC | Age: 30
End: 2025-02-28
Payer: COMMERCIAL

## 2025-02-28 ENCOUNTER — RESULT REVIEW (OUTPATIENT)
Age: 30
End: 2025-02-28

## 2025-02-28 VITALS
WEIGHT: 170.4 LBS | HEIGHT: 68 IN | OXYGEN SATURATION: 98 % | BODY MASS INDEX: 25.82 KG/M2 | RESPIRATION RATE: 16 BRPM | HEART RATE: 65 BPM | DIASTOLIC BLOOD PRESSURE: 80 MMHG | TEMPERATURE: 98.3 F | SYSTOLIC BLOOD PRESSURE: 123 MMHG

## 2025-02-28 DIAGNOSIS — C92.10 CHRONIC MYELOID LEUKEMIA, BCR/ABL-POSITIVE, NOT HAVING ACHIEVED REMISSION: ICD-10-CM

## 2025-02-28 LAB
BASOPHILS # BLD AUTO: 0.02 K/UL — SIGNIFICANT CHANGE UP (ref 0–0.2)
BASOPHILS NFR BLD AUTO: 0.2 % — SIGNIFICANT CHANGE UP (ref 0–2)
EOSINOPHIL # BLD AUTO: 0.33 K/UL — SIGNIFICANT CHANGE UP (ref 0–0.5)
EOSINOPHIL NFR BLD AUTO: 3.7 % — SIGNIFICANT CHANGE UP (ref 0–6)
HCT VFR BLD CALC: 38 % — LOW (ref 39–50)
HGB BLD-MCNC: 12.9 G/DL — LOW (ref 13–17)
IMM GRANULOCYTES NFR BLD AUTO: 0.1 % — SIGNIFICANT CHANGE UP (ref 0–0.9)
LYMPHOCYTES # BLD AUTO: 5.47 K/UL — HIGH (ref 1–3.3)
LYMPHOCYTES # BLD AUTO: 61.9 % — HIGH (ref 13–44)
MCHC RBC-ENTMCNC: 30.3 PG — SIGNIFICANT CHANGE UP (ref 27–34)
MCHC RBC-ENTMCNC: 33.9 G/DL — SIGNIFICANT CHANGE UP (ref 32–36)
MCV RBC AUTO: 89.2 FL — SIGNIFICANT CHANGE UP (ref 80–100)
MONOCYTES # BLD AUTO: 0.66 K/UL — SIGNIFICANT CHANGE UP (ref 0–0.9)
MONOCYTES NFR BLD AUTO: 7.5 % — SIGNIFICANT CHANGE UP (ref 2–14)
NEUTROPHILS # BLD AUTO: 2.34 K/UL — SIGNIFICANT CHANGE UP (ref 1.8–7.4)
NEUTROPHILS NFR BLD AUTO: 26.6 % — LOW (ref 43–77)
NRBC BLD AUTO-RTO: 0 /100 WBCS — SIGNIFICANT CHANGE UP (ref 0–0)
PLATELET # BLD AUTO: 87 K/UL — LOW (ref 150–400)
RBC # BLD: 4.26 M/UL — SIGNIFICANT CHANGE UP (ref 4.2–5.8)
RBC # FLD: 15.4 % — HIGH (ref 10.3–14.5)
WBC # BLD: 8.83 K/UL — SIGNIFICANT CHANGE UP (ref 3.8–10.5)
WBC # FLD AUTO: 8.83 K/UL — SIGNIFICANT CHANGE UP (ref 3.8–10.5)

## 2025-02-28 PROCEDURE — 99214 OFFICE O/P EST MOD 30 MIN: CPT

## 2025-03-06 LAB
ALBUMIN SERPL ELPH-MCNC: 5 G/DL
ALP BLD-CCNC: 84 U/L
ALT SERPL-CCNC: 48 U/L
ANION GAP SERPL CALC-SCNC: 12 MMOL/L
AST SERPL-CCNC: 37 U/L
BILIRUB SERPL-MCNC: 1.1 MG/DL
BUN SERPL-MCNC: 22 MG/DL
CALCIUM SERPL-MCNC: 9.7 MG/DL
CHLORIDE SERPL-SCNC: 103 MMOL/L
CO2 SERPL-SCNC: 24 MMOL/L
CREAT SERPL-MCNC: 0.86 MG/DL
EGFR: 120 ML/MIN/1.73M2
GLUCOSE SERPL-MCNC: 92 MG/DL
POTASSIUM SERPL-SCNC: 4.4 MMOL/L
PROT SERPL-MCNC: 7.7 G/DL
SODIUM SERPL-SCNC: 139 MMOL/L
T(9;22)(ABL1,BCR)/CONTROL BLD/T: NORMAL

## 2025-03-27 ENCOUNTER — APPOINTMENT (OUTPATIENT)
Dept: HEMATOLOGY ONCOLOGY | Facility: CLINIC | Age: 30
End: 2025-03-27
Payer: COMMERCIAL

## 2025-03-27 ENCOUNTER — RESULT REVIEW (OUTPATIENT)
Age: 30
End: 2025-03-27

## 2025-03-27 VITALS
HEART RATE: 67 BPM | TEMPERATURE: 99.1 F | DIASTOLIC BLOOD PRESSURE: 68 MMHG | OXYGEN SATURATION: 99 % | WEIGHT: 170.86 LBS | RESPIRATION RATE: 16 BRPM | BODY MASS INDEX: 25.98 KG/M2 | SYSTOLIC BLOOD PRESSURE: 125 MMHG

## 2025-03-27 DIAGNOSIS — C92.10 CHRONIC MYELOID LEUKEMIA, BCR/ABL-POSITIVE, NOT HAVING ACHIEVED REMISSION: ICD-10-CM

## 2025-03-27 LAB
BASOPHILS # BLD AUTO: 0.01 K/UL — SIGNIFICANT CHANGE UP (ref 0–0.2)
BASOPHILS NFR BLD AUTO: 0.2 % — SIGNIFICANT CHANGE UP (ref 0–2)
EOSINOPHIL # BLD AUTO: 0.28 K/UL — SIGNIFICANT CHANGE UP (ref 0–0.5)
EOSINOPHIL NFR BLD AUTO: 5.6 % — SIGNIFICANT CHANGE UP (ref 0–6)
HCT VFR BLD CALC: 38.2 % — LOW (ref 39–50)
HGB BLD-MCNC: 13.1 G/DL — SIGNIFICANT CHANGE UP (ref 13–17)
IMM GRANULOCYTES NFR BLD AUTO: 0.2 % — SIGNIFICANT CHANGE UP (ref 0–0.9)
LYMPHOCYTES # BLD AUTO: 2.31 K/UL — SIGNIFICANT CHANGE UP (ref 1–3.3)
LYMPHOCYTES # BLD AUTO: 45.8 % — HIGH (ref 13–44)
MCHC RBC-ENTMCNC: 30.8 PG — SIGNIFICANT CHANGE UP (ref 27–34)
MCHC RBC-ENTMCNC: 34.3 G/DL — SIGNIFICANT CHANGE UP (ref 32–36)
MCV RBC AUTO: 89.7 FL — SIGNIFICANT CHANGE UP (ref 80–100)
MONOCYTES # BLD AUTO: 0.35 K/UL — SIGNIFICANT CHANGE UP (ref 0–0.9)
MONOCYTES NFR BLD AUTO: 6.9 % — SIGNIFICANT CHANGE UP (ref 2–14)
NEUTROPHILS # BLD AUTO: 2.08 K/UL — SIGNIFICANT CHANGE UP (ref 1.8–7.4)
NEUTROPHILS NFR BLD AUTO: 41.3 % — LOW (ref 43–77)
NRBC BLD AUTO-RTO: 0 /100 WBCS — SIGNIFICANT CHANGE UP (ref 0–0)
PLATELET # BLD AUTO: 125 K/UL — LOW (ref 150–400)
RBC # BLD: 4.26 M/UL — SIGNIFICANT CHANGE UP (ref 4.2–5.8)
RBC # FLD: 14.6 % — HIGH (ref 10.3–14.5)
WBC # BLD: 5.04 K/UL — SIGNIFICANT CHANGE UP (ref 3.8–10.5)
WBC # FLD AUTO: 5.04 K/UL — SIGNIFICANT CHANGE UP (ref 3.8–10.5)

## 2025-03-27 PROCEDURE — G2211 COMPLEX E/M VISIT ADD ON: CPT

## 2025-03-27 PROCEDURE — 99214 OFFICE O/P EST MOD 30 MIN: CPT

## 2025-04-01 LAB
ALBUMIN SERPL ELPH-MCNC: 5.1 G/DL
ALP BLD-CCNC: 87 U/L
ALT SERPL-CCNC: 40 U/L
ANION GAP SERPL CALC-SCNC: 12 MMOL/L
AST SERPL-CCNC: 31 U/L
BILIRUB SERPL-MCNC: 1.1 MG/DL
BUN SERPL-MCNC: 20 MG/DL
CALCIUM SERPL-MCNC: 9.7 MG/DL
CHLORIDE SERPL-SCNC: 103 MMOL/L
CO2 SERPL-SCNC: 25 MMOL/L
CREAT SERPL-MCNC: 0.91 MG/DL
EGFRCR SERPLBLD CKD-EPI 2021: 117 ML/MIN/1.73M2
GLUCOSE SERPL-MCNC: 90 MG/DL
POTASSIUM SERPL-SCNC: 4.4 MMOL/L
PROT SERPL-MCNC: 8.2 G/DL
SODIUM SERPL-SCNC: 139 MMOL/L
T(9;22)(ABL1,BCR)/CONTROL BLD/T: NORMAL

## 2025-04-25 ENCOUNTER — OUTPATIENT (OUTPATIENT)
Dept: OUTPATIENT SERVICES | Facility: HOSPITAL | Age: 30
LOS: 1 days | Discharge: HOME | End: 2025-04-25

## 2025-04-25 DIAGNOSIS — D64.9 ANEMIA, UNSPECIFIED: ICD-10-CM

## 2025-04-28 ENCOUNTER — APPOINTMENT (OUTPATIENT)
Dept: HEMATOLOGY ONCOLOGY | Facility: CLINIC | Age: 30
End: 2025-04-28

## 2025-04-30 ENCOUNTER — RESULT REVIEW (OUTPATIENT)
Age: 30
End: 2025-04-30

## 2025-04-30 ENCOUNTER — APPOINTMENT (OUTPATIENT)
Dept: HEMATOLOGY ONCOLOGY | Facility: CLINIC | Age: 30
End: 2025-04-30
Payer: COMMERCIAL

## 2025-04-30 VITALS
BODY MASS INDEX: 25.84 KG/M2 | WEIGHT: 169.95 LBS | HEART RATE: 73 BPM | RESPIRATION RATE: 16 BRPM | DIASTOLIC BLOOD PRESSURE: 73 MMHG | SYSTOLIC BLOOD PRESSURE: 118 MMHG | OXYGEN SATURATION: 98 % | TEMPERATURE: 98.3 F

## 2025-04-30 DIAGNOSIS — C92.10 CHRONIC MYELOID LEUKEMIA, BCR/ABL-POSITIVE, NOT HAVING ACHIEVED REMISSION: ICD-10-CM

## 2025-04-30 LAB
BASOPHILS # BLD AUTO: 0.01 K/UL — SIGNIFICANT CHANGE UP (ref 0–0.2)
BASOPHILS NFR BLD AUTO: 0.1 % — SIGNIFICANT CHANGE UP (ref 0–2)
EOSINOPHIL # BLD AUTO: 0.2 K/UL — SIGNIFICANT CHANGE UP (ref 0–0.5)
EOSINOPHIL NFR BLD AUTO: 2.8 % — SIGNIFICANT CHANGE UP (ref 0–6)
HCT VFR BLD CALC: 40.6 % — SIGNIFICANT CHANGE UP (ref 39–50)
HGB BLD-MCNC: 13.7 G/DL — SIGNIFICANT CHANGE UP (ref 13–17)
IMM GRANULOCYTES NFR BLD AUTO: 0.1 % — SIGNIFICANT CHANGE UP (ref 0–0.9)
LYMPHOCYTES # BLD AUTO: 4.11 K/UL — HIGH (ref 1–3.3)
LYMPHOCYTES # BLD AUTO: 57 % — HIGH (ref 13–44)
MCHC RBC-ENTMCNC: 30.2 PG — SIGNIFICANT CHANGE UP (ref 27–34)
MCHC RBC-ENTMCNC: 33.7 G/DL — SIGNIFICANT CHANGE UP (ref 32–36)
MCV RBC AUTO: 89.6 FL — SIGNIFICANT CHANGE UP (ref 80–100)
MONOCYTES # BLD AUTO: 0.38 K/UL — SIGNIFICANT CHANGE UP (ref 0–0.9)
MONOCYTES NFR BLD AUTO: 5.3 % — SIGNIFICANT CHANGE UP (ref 2–14)
NEUTROPHILS # BLD AUTO: 2.5 K/UL — SIGNIFICANT CHANGE UP (ref 1.8–7.4)
NEUTROPHILS NFR BLD AUTO: 34.7 % — LOW (ref 43–77)
NRBC BLD AUTO-RTO: 0 /100 WBCS — SIGNIFICANT CHANGE UP (ref 0–0)
PLATELET # BLD AUTO: 102 K/UL — LOW (ref 150–400)
RBC # BLD: 4.53 M/UL — SIGNIFICANT CHANGE UP (ref 4.2–5.8)
RBC # FLD: 14.8 % — HIGH (ref 10.3–14.5)
WBC # BLD: 7.21 K/UL — SIGNIFICANT CHANGE UP (ref 3.8–10.5)
WBC # FLD AUTO: 7.21 K/UL — SIGNIFICANT CHANGE UP (ref 3.8–10.5)

## 2025-04-30 PROCEDURE — 99214 OFFICE O/P EST MOD 30 MIN: CPT

## 2025-04-30 PROCEDURE — G2211 COMPLEX E/M VISIT ADD ON: CPT

## 2025-05-02 LAB
ALBUMIN SERPL ELPH-MCNC: 5.1 G/DL
ALP BLD-CCNC: 69 U/L
ALT SERPL-CCNC: 47 U/L
ANION GAP SERPL CALC-SCNC: 11 MMOL/L
AST SERPL-CCNC: 35 U/L
BILIRUB SERPL-MCNC: 1.1 MG/DL
BUN SERPL-MCNC: 19 MG/DL
CALCIUM SERPL-MCNC: 9.8 MG/DL
CHLORIDE SERPL-SCNC: 105 MMOL/L
CO2 SERPL-SCNC: 23 MMOL/L
CREAT SERPL-MCNC: 0.86 MG/DL
EGFRCR SERPLBLD CKD-EPI 2021: 119 ML/MIN/1.73M2
GLUCOSE SERPL-MCNC: 100 MG/DL
LDH SERPL-CCNC: 194 U/L
POTASSIUM SERPL-SCNC: 4.2 MMOL/L
PROT SERPL-MCNC: 7.9 G/DL
SODIUM SERPL-SCNC: 139 MMOL/L
T(9;22)(ABL1,BCR)/CONTROL BLD/T: NORMAL

## 2025-05-27 ENCOUNTER — APPOINTMENT (OUTPATIENT)
Dept: FAMILY MEDICINE | Facility: CLINIC | Age: 30
End: 2025-05-27
Payer: COMMERCIAL

## 2025-05-27 VITALS
RESPIRATION RATE: 18 BRPM | OXYGEN SATURATION: 97 % | HEART RATE: 79 BPM | DIASTOLIC BLOOD PRESSURE: 70 MMHG | SYSTOLIC BLOOD PRESSURE: 113 MMHG | BODY MASS INDEX: 26.37 KG/M2 | TEMPERATURE: 98.7 F | HEIGHT: 68 IN | WEIGHT: 174 LBS

## 2025-05-27 DIAGNOSIS — J45.20 MILD INTERMITTENT ASTHMA, UNCOMPLICATED: ICD-10-CM

## 2025-05-27 DIAGNOSIS — L30.9 DERMATITIS, UNSPECIFIED: ICD-10-CM

## 2025-05-27 PROCEDURE — 99213 OFFICE O/P EST LOW 20 MIN: CPT

## 2025-05-27 PROCEDURE — 36415 COLL VENOUS BLD VENIPUNCTURE: CPT

## 2025-05-27 PROCEDURE — G2211 COMPLEX E/M VISIT ADD ON: CPT

## 2025-05-28 LAB
CHOLEST SERPL-MCNC: 188 MG/DL
ESTIMATED AVERAGE GLUCOSE: 108 MG/DL
HBA1C MFR BLD HPLC: 5.4 %
HDLC SERPL-MCNC: 63 MG/DL
LDLC SERPL-MCNC: 96 MG/DL
NONHDLC SERPL-MCNC: 125 MG/DL
TRIGL SERPL-MCNC: 167 MG/DL

## 2025-05-29 ENCOUNTER — APPOINTMENT (OUTPATIENT)
Dept: HEMATOLOGY ONCOLOGY | Facility: CLINIC | Age: 30
End: 2025-05-29
Payer: COMMERCIAL

## 2025-05-29 ENCOUNTER — RESULT REVIEW (OUTPATIENT)
Age: 30
End: 2025-05-29

## 2025-05-29 VITALS
RESPIRATION RATE: 16 BRPM | HEART RATE: 68 BPM | WEIGHT: 175.25 LBS | SYSTOLIC BLOOD PRESSURE: 105 MMHG | OXYGEN SATURATION: 99 % | BODY MASS INDEX: 26.65 KG/M2 | TEMPERATURE: 98.2 F | DIASTOLIC BLOOD PRESSURE: 69 MMHG

## 2025-05-29 DIAGNOSIS — C92.10 CHRONIC MYELOID LEUKEMIA, BCR/ABL-POSITIVE, NOT HAVING ACHIEVED REMISSION: ICD-10-CM

## 2025-05-29 LAB
BASOPHILS # BLD AUTO: 0.02 K/UL — SIGNIFICANT CHANGE UP (ref 0–0.2)
BASOPHILS NFR BLD AUTO: 0.3 % — SIGNIFICANT CHANGE UP (ref 0–2)
EOSINOPHIL # BLD AUTO: 0.37 K/UL — SIGNIFICANT CHANGE UP (ref 0–0.5)
EOSINOPHIL NFR BLD AUTO: 4.8 % — SIGNIFICANT CHANGE UP (ref 0–6)
HCT VFR BLD CALC: 37.9 % — LOW (ref 39–50)
HGB BLD-MCNC: 12.6 G/DL — LOW (ref 13–17)
IMM GRANULOCYTES NFR BLD AUTO: 0 % — SIGNIFICANT CHANGE UP (ref 0–0.9)
LYMPHOCYTES # BLD AUTO: 4.37 K/UL — HIGH (ref 1–3.3)
LYMPHOCYTES # BLD AUTO: 57.1 % — HIGH (ref 13–44)
MCHC RBC-ENTMCNC: 30.2 PG — SIGNIFICANT CHANGE UP (ref 27–34)
MCHC RBC-ENTMCNC: 33.2 G/DL — SIGNIFICANT CHANGE UP (ref 32–36)
MCV RBC AUTO: 90.9 FL — SIGNIFICANT CHANGE UP (ref 80–100)
MONOCYTES # BLD AUTO: 0.63 K/UL — SIGNIFICANT CHANGE UP (ref 0–0.9)
MONOCYTES NFR BLD AUTO: 8.2 % — SIGNIFICANT CHANGE UP (ref 2–14)
NEUTROPHILS # BLD AUTO: 2.26 K/UL — SIGNIFICANT CHANGE UP (ref 1.8–7.4)
NEUTROPHILS NFR BLD AUTO: 29.6 % — LOW (ref 43–77)
NRBC BLD AUTO-RTO: 0 /100 WBCS — SIGNIFICANT CHANGE UP (ref 0–0)
PLATELET # BLD AUTO: 114 K/UL — LOW (ref 150–400)
RBC # BLD: 4.17 M/UL — LOW (ref 4.2–5.8)
RBC # FLD: 15 % — HIGH (ref 10.3–14.5)
WBC # BLD: 7.65 K/UL — SIGNIFICANT CHANGE UP (ref 3.8–10.5)
WBC # FLD AUTO: 7.65 K/UL — SIGNIFICANT CHANGE UP (ref 3.8–10.5)

## 2025-05-29 PROCEDURE — G2211 COMPLEX E/M VISIT ADD ON: CPT

## 2025-05-29 PROCEDURE — 99214 OFFICE O/P EST MOD 30 MIN: CPT

## 2025-05-30 ENCOUNTER — NON-APPOINTMENT (OUTPATIENT)
Age: 30
End: 2025-05-30

## 2025-05-30 LAB
ALBUMIN SERPL ELPH-MCNC: 5 G/DL
ALP BLD-CCNC: 73 U/L
ALT SERPL-CCNC: 74 U/L
ANION GAP SERPL CALC-SCNC: 12 MMOL/L
AST SERPL-CCNC: 148 U/L
BILIRUB SERPL-MCNC: 1.2 MG/DL
BUN SERPL-MCNC: 21 MG/DL
CALCIUM SERPL-MCNC: 9.8 MG/DL
CHLORIDE SERPL-SCNC: 103 MMOL/L
CO2 SERPL-SCNC: 23 MMOL/L
CREAT SERPL-MCNC: 0.85 MG/DL
EGFRCR SERPLBLD CKD-EPI 2021: 120 ML/MIN/1.73M2
GLUCOSE SERPL-MCNC: 100 MG/DL
LDH SERPL-CCNC: 409 U/L
POTASSIUM SERPL-SCNC: 4.5 MMOL/L
PROT SERPL-MCNC: 7.8 G/DL
SODIUM SERPL-SCNC: 138 MMOL/L

## 2025-06-04 LAB — T(9;22)(ABL1,BCR)/CONTROL BLD/T: NORMAL

## 2025-06-23 ENCOUNTER — OUTPATIENT (OUTPATIENT)
Dept: OUTPATIENT SERVICES | Facility: HOSPITAL | Age: 30
LOS: 1 days | Discharge: HOME | End: 2025-06-23

## 2025-06-23 DIAGNOSIS — D64.9 ANEMIA, UNSPECIFIED: ICD-10-CM

## 2025-06-27 ENCOUNTER — APPOINTMENT (OUTPATIENT)
Dept: HEMATOLOGY ONCOLOGY | Facility: CLINIC | Age: 30
End: 2025-06-27

## 2025-06-27 ENCOUNTER — RESULT REVIEW (OUTPATIENT)
Age: 30
End: 2025-06-27

## 2025-06-27 VITALS
DIASTOLIC BLOOD PRESSURE: 71 MMHG | SYSTOLIC BLOOD PRESSURE: 111 MMHG | WEIGHT: 174.14 LBS | OXYGEN SATURATION: 99 % | RESPIRATION RATE: 16 BRPM | BODY MASS INDEX: 26.48 KG/M2 | HEART RATE: 63 BPM | TEMPERATURE: 98.4 F

## 2025-06-27 LAB
BASOPHILS # BLD AUTO: 0.01 K/UL — SIGNIFICANT CHANGE UP (ref 0–0.2)
BASOPHILS NFR BLD AUTO: 0.2 % — SIGNIFICANT CHANGE UP (ref 0–2)
EOSINOPHIL # BLD AUTO: 0.16 K/UL — SIGNIFICANT CHANGE UP (ref 0–0.5)
EOSINOPHIL NFR BLD AUTO: 3 % — SIGNIFICANT CHANGE UP (ref 0–6)
HCT VFR BLD CALC: 36.9 % — LOW (ref 39–50)
HGB BLD-MCNC: 12.3 G/DL — LOW (ref 13–17)
IMM GRANULOCYTES NFR BLD AUTO: 0.2 % — SIGNIFICANT CHANGE UP (ref 0–0.9)
LYMPHOCYTES # BLD AUTO: 2.68 K/UL — SIGNIFICANT CHANGE UP (ref 1–3.3)
LYMPHOCYTES # BLD AUTO: 51 % — HIGH (ref 13–44)
MCHC RBC-ENTMCNC: 30.5 PG — SIGNIFICANT CHANGE UP (ref 27–34)
MCHC RBC-ENTMCNC: 33.3 G/DL — SIGNIFICANT CHANGE UP (ref 32–36)
MCV RBC AUTO: 91.6 FL — SIGNIFICANT CHANGE UP (ref 80–100)
MONOCYTES # BLD AUTO: 0.56 K/UL — SIGNIFICANT CHANGE UP (ref 0–0.9)
MONOCYTES NFR BLD AUTO: 10.6 % — SIGNIFICANT CHANGE UP (ref 2–14)
NEUTROPHILS # BLD AUTO: 1.84 K/UL — SIGNIFICANT CHANGE UP (ref 1.8–7.4)
NEUTROPHILS NFR BLD AUTO: 35 % — LOW (ref 43–77)
NRBC BLD AUTO-RTO: 0 /100 WBCS — SIGNIFICANT CHANGE UP (ref 0–0)
PLATELET # BLD AUTO: 103 K/UL — LOW (ref 150–400)
RBC # BLD: 4.03 M/UL — LOW (ref 4.2–5.8)
RBC # FLD: 14.6 % — HIGH (ref 10.3–14.5)
WBC # BLD: 5.26 K/UL — SIGNIFICANT CHANGE UP (ref 3.8–10.5)
WBC # FLD AUTO: 5.26 K/UL — SIGNIFICANT CHANGE UP (ref 3.8–10.5)

## 2025-07-01 LAB
ALBUMIN SERPL ELPH-MCNC: 4.9 G/DL
ALP BLD-CCNC: 84 U/L
ALT SERPL-CCNC: 51 U/L
ANION GAP SERPL CALC-SCNC: 15 MMOL/L
AST SERPL-CCNC: 37 U/L
BILIRUB SERPL-MCNC: 1.1 MG/DL
BUN SERPL-MCNC: 19 MG/DL
CALCIUM SERPL-MCNC: 9.4 MG/DL
CHLORIDE SERPL-SCNC: 102 MMOL/L
CO2 SERPL-SCNC: 21 MMOL/L
CREAT SERPL-MCNC: 0.86 MG/DL
EGFRCR SERPLBLD CKD-EPI 2021: 119 ML/MIN/1.73M2
GLUCOSE SERPL-MCNC: 80 MG/DL
POTASSIUM SERPL-SCNC: 4.1 MMOL/L
PROT SERPL-MCNC: 7.9 G/DL
SODIUM SERPL-SCNC: 138 MMOL/L

## 2025-07-03 LAB — T(9;22)(ABL1,BCR)/CONTROL BLD/T: NORMAL

## 2025-07-25 ENCOUNTER — RESULT REVIEW (OUTPATIENT)
Age: 30
End: 2025-07-25

## 2025-07-25 ENCOUNTER — APPOINTMENT (OUTPATIENT)
Dept: HEMATOLOGY ONCOLOGY | Facility: CLINIC | Age: 30
End: 2025-07-25
Payer: COMMERCIAL

## 2025-07-25 VITALS
OXYGEN SATURATION: 98 % | RESPIRATION RATE: 16 BRPM | WEIGHT: 174.16 LBS | DIASTOLIC BLOOD PRESSURE: 69 MMHG | HEIGHT: 68.9 IN | TEMPERATURE: 97.9 F | BODY MASS INDEX: 25.8 KG/M2 | HEART RATE: 64 BPM | SYSTOLIC BLOOD PRESSURE: 113 MMHG

## 2025-07-25 LAB
BASOPHILS # BLD AUTO: 0.02 K/UL — SIGNIFICANT CHANGE UP (ref 0–0.2)
BASOPHILS NFR BLD AUTO: 0.3 % — SIGNIFICANT CHANGE UP (ref 0–2)
EOSINOPHIL # BLD AUTO: 0.2 K/UL — SIGNIFICANT CHANGE UP (ref 0–0.5)
EOSINOPHIL NFR BLD AUTO: 2.6 % — SIGNIFICANT CHANGE UP (ref 0–6)
HCT VFR BLD CALC: 35.6 % — LOW (ref 39–50)
HGB BLD-MCNC: 12.3 G/DL — LOW (ref 13–17)
IMM GRANULOCYTES NFR BLD AUTO: 0.1 % — SIGNIFICANT CHANGE UP (ref 0–0.9)
LYMPHOCYTES # BLD AUTO: 4.28 K/UL — HIGH (ref 1–3.3)
LYMPHOCYTES # BLD AUTO: 54.9 % — HIGH (ref 13–44)
MCHC RBC-ENTMCNC: 31.6 PG — SIGNIFICANT CHANGE UP (ref 27–34)
MCHC RBC-ENTMCNC: 34.6 G/DL — SIGNIFICANT CHANGE UP (ref 32–36)
MCV RBC AUTO: 91.5 FL — SIGNIFICANT CHANGE UP (ref 80–100)
MONOCYTES # BLD AUTO: 0.69 K/UL — SIGNIFICANT CHANGE UP (ref 0–0.9)
MONOCYTES NFR BLD AUTO: 8.9 % — SIGNIFICANT CHANGE UP (ref 2–14)
NEUTROPHILS # BLD AUTO: 2.59 K/UL — SIGNIFICANT CHANGE UP (ref 1.8–7.4)
NEUTROPHILS NFR BLD AUTO: 33.2 % — LOW (ref 43–77)
NRBC BLD AUTO-RTO: 0 /100 WBCS — SIGNIFICANT CHANGE UP (ref 0–0)
PLATELET # BLD AUTO: 86 K/UL — LOW (ref 150–400)
RBC # BLD: 3.89 M/UL — LOW (ref 4.2–5.8)
RBC # FLD: 14.6 % — HIGH (ref 10.3–14.5)
WBC # BLD: 7.79 K/UL — SIGNIFICANT CHANGE UP (ref 3.8–10.5)
WBC # FLD AUTO: 7.79 K/UL — SIGNIFICANT CHANGE UP (ref 3.8–10.5)

## 2025-07-25 PROCEDURE — 99214 OFFICE O/P EST MOD 30 MIN: CPT

## 2025-07-29 LAB
ALBUMIN SERPL ELPH-MCNC: 5 G/DL
ALP BLD-CCNC: 74 U/L
ALT SERPL-CCNC: 44 U/L
ANION GAP SERPL CALC-SCNC: 13 MMOL/L
AST SERPL-CCNC: 39 U/L
BILIRUB SERPL-MCNC: 0.9 MG/DL
BUN SERPL-MCNC: 20 MG/DL
CALCIUM SERPL-MCNC: 9.7 MG/DL
CHLORIDE SERPL-SCNC: 103 MMOL/L
CO2 SERPL-SCNC: 22 MMOL/L
CREAT SERPL-MCNC: 0.85 MG/DL
EGFRCR SERPLBLD CKD-EPI 2021: 120 ML/MIN/1.73M2
GLUCOSE SERPL-MCNC: 97 MG/DL
POTASSIUM SERPL-SCNC: 4.2 MMOL/L
PROT SERPL-MCNC: 7.7 G/DL
SODIUM SERPL-SCNC: 139 MMOL/L

## 2025-07-30 LAB — T(9;22)(ABL1,BCR)/CONTROL BLD/T: NORMAL

## 2025-08-05 ENCOUNTER — APPOINTMENT (OUTPATIENT)
Dept: INTERNAL MEDICINE | Facility: CLINIC | Age: 30
End: 2025-08-05
Payer: COMMERCIAL

## 2025-08-05 VITALS
HEART RATE: 68 BPM | OXYGEN SATURATION: 98 % | WEIGHT: 172 LBS | BODY MASS INDEX: 25.48 KG/M2 | DIASTOLIC BLOOD PRESSURE: 54 MMHG | TEMPERATURE: 97.4 F | SYSTOLIC BLOOD PRESSURE: 106 MMHG | HEIGHT: 68.9 IN

## 2025-08-05 DIAGNOSIS — R22.9 LOCALIZED SWELLING, MASS AND LUMP, UNSPECIFIED: ICD-10-CM

## 2025-08-05 PROCEDURE — 99213 OFFICE O/P EST LOW 20 MIN: CPT

## 2025-08-05 RX ORDER — SULFAMETHOXAZOLE AND TRIMETHOPRIM 800; 160 MG/1; MG/1
800-160 TABLET ORAL TWICE DAILY
Qty: 10 | Refills: 0 | Status: ACTIVE | COMMUNITY
Start: 2025-08-05 | End: 1900-01-01

## 2025-08-06 ENCOUNTER — APPOINTMENT (OUTPATIENT)
Dept: ULTRASOUND IMAGING | Facility: CLINIC | Age: 30
End: 2025-08-06
Payer: COMMERCIAL

## 2025-08-06 PROCEDURE — 76882 US LMTD JT/FCL EVL NVASC XTR: CPT | Mod: RT

## 2025-08-07 ENCOUNTER — RESULT REVIEW (OUTPATIENT)
Age: 30
End: 2025-08-07

## 2025-08-07 ENCOUNTER — APPOINTMENT (OUTPATIENT)
Dept: HEMATOLOGY ONCOLOGY | Facility: CLINIC | Age: 30
End: 2025-08-07
Payer: COMMERCIAL

## 2025-08-07 VITALS
RESPIRATION RATE: 16 BRPM | HEIGHT: 68.9 IN | OXYGEN SATURATION: 98 % | HEART RATE: 63 BPM | TEMPERATURE: 97.1 F | WEIGHT: 174.16 LBS | SYSTOLIC BLOOD PRESSURE: 120 MMHG | DIASTOLIC BLOOD PRESSURE: 72 MMHG | BODY MASS INDEX: 25.8 KG/M2

## 2025-08-07 DIAGNOSIS — C92.10 CHRONIC MYELOID LEUKEMIA, BCR/ABL-POSITIVE, NOT HAVING ACHIEVED REMISSION: ICD-10-CM

## 2025-08-07 LAB
BASOPHILS # BLD AUTO: 0.01 K/UL — SIGNIFICANT CHANGE UP (ref 0–0.2)
BASOPHILS NFR BLD AUTO: 0.2 % — SIGNIFICANT CHANGE UP (ref 0–2)
EOSINOPHIL # BLD AUTO: 0.11 K/UL — SIGNIFICANT CHANGE UP (ref 0–0.5)
EOSINOPHIL NFR BLD AUTO: 2 % — SIGNIFICANT CHANGE UP (ref 0–6)
HCT VFR BLD CALC: 36.3 % — LOW (ref 39–50)
HGB BLD-MCNC: 12.1 G/DL — LOW (ref 13–17)
IMM GRANULOCYTES NFR BLD AUTO: 0.2 % — SIGNIFICANT CHANGE UP (ref 0–0.9)
LYMPHOCYTES # BLD AUTO: 2.91 K/UL — SIGNIFICANT CHANGE UP (ref 1–3.3)
LYMPHOCYTES # BLD AUTO: 52.2 % — HIGH (ref 13–44)
MCHC RBC-ENTMCNC: 30.6 PG — SIGNIFICANT CHANGE UP (ref 27–34)
MCHC RBC-ENTMCNC: 33.3 G/DL — SIGNIFICANT CHANGE UP (ref 32–36)
MCV RBC AUTO: 91.9 FL — SIGNIFICANT CHANGE UP (ref 80–100)
MONOCYTES # BLD AUTO: 0.37 K/UL — SIGNIFICANT CHANGE UP (ref 0–0.9)
MONOCYTES NFR BLD AUTO: 6.6 % — SIGNIFICANT CHANGE UP (ref 2–14)
NEUTROPHILS # BLD AUTO: 2.16 K/UL — SIGNIFICANT CHANGE UP (ref 1.8–7.4)
NEUTROPHILS NFR BLD AUTO: 38.8 % — LOW (ref 43–77)
NRBC BLD AUTO-RTO: 0 /100 WBCS — SIGNIFICANT CHANGE UP (ref 0–0)
PLATELET # BLD AUTO: 91 K/UL — LOW (ref 150–400)
RBC # BLD: 3.95 M/UL — LOW (ref 4.2–5.8)
RBC # FLD: 14 % — SIGNIFICANT CHANGE UP (ref 10.3–14.5)
WBC # BLD: 5.57 K/UL — SIGNIFICANT CHANGE UP (ref 3.8–10.5)
WBC # FLD AUTO: 5.57 K/UL — SIGNIFICANT CHANGE UP (ref 3.8–10.5)

## 2025-08-07 PROCEDURE — G2211 COMPLEX E/M VISIT ADD ON: CPT

## 2025-08-07 PROCEDURE — 99214 OFFICE O/P EST MOD 30 MIN: CPT

## 2025-08-08 LAB
ALBUMIN SERPL ELPH-MCNC: 4.8 G/DL
ALP BLD-CCNC: 91 U/L
ALT SERPL-CCNC: 47 U/L
ANION GAP SERPL CALC-SCNC: 13 MMOL/L
AST SERPL-CCNC: 41 U/L
BILIRUB SERPL-MCNC: 0.6 MG/DL
BUN SERPL-MCNC: 16 MG/DL
CALCIUM SERPL-MCNC: 9.5 MG/DL
CHLORIDE SERPL-SCNC: 103 MMOL/L
CO2 SERPL-SCNC: 24 MMOL/L
CREAT SERPL-MCNC: 0.95 MG/DL
EGFRCR SERPLBLD CKD-EPI 2021: 110 ML/MIN/1.73M2
GLUCOSE SERPL-MCNC: 94 MG/DL
POTASSIUM SERPL-SCNC: 4.4 MMOL/L
PROT SERPL-MCNC: 7.8 G/DL
SODIUM SERPL-SCNC: 140 MMOL/L

## 2025-08-13 LAB — T(9;22)(ABL1,BCR)/CONTROL BLD/T: NORMAL

## 2025-08-19 ENCOUNTER — APPOINTMENT (OUTPATIENT)
Dept: FAMILY MEDICINE | Facility: CLINIC | Age: 30
End: 2025-08-19
Payer: COMMERCIAL

## 2025-08-19 VITALS
WEIGHT: 174 LBS | SYSTOLIC BLOOD PRESSURE: 135 MMHG | TEMPERATURE: 98.2 F | OXYGEN SATURATION: 100 % | HEART RATE: 72 BPM | DIASTOLIC BLOOD PRESSURE: 77 MMHG | BODY MASS INDEX: 25.77 KG/M2 | HEIGHT: 69 IN

## 2025-08-19 DIAGNOSIS — Z87.01 PERSONAL HISTORY OF PNEUMONIA (RECURRENT): ICD-10-CM

## 2025-08-19 DIAGNOSIS — L56.8 OTHER SPECIFIED ACUTE SKIN CHANGES DUE TO ULTRAVIOLET RADIATION: ICD-10-CM

## 2025-08-19 DIAGNOSIS — Z87.898 PERSONAL HISTORY OF OTHER SPECIFIED CONDITIONS: ICD-10-CM

## 2025-08-19 DIAGNOSIS — C92.10 CHRONIC MYELOID LEUKEMIA, BCR/ABL-POSITIVE, NOT HAVING ACHIEVED REMISSION: ICD-10-CM

## 2025-08-19 DIAGNOSIS — Z87.438 PERSONAL HISTORY OF OTHER DISEASES OF MALE GENITAL ORGANS: ICD-10-CM

## 2025-08-19 DIAGNOSIS — L02.419 CUTANEOUS ABSCESS OF LIMB, UNSPECIFIED: ICD-10-CM

## 2025-08-19 DIAGNOSIS — J45.20 MILD INTERMITTENT ASTHMA, UNCOMPLICATED: ICD-10-CM

## 2025-08-19 PROCEDURE — 99213 OFFICE O/P EST LOW 20 MIN: CPT

## 2025-08-20 PROBLEM — L02.419 AXILLARY ABSCESS: Status: RESOLVED | Noted: 2025-08-20 | Resolved: 2025-08-20

## 2025-08-20 PROBLEM — Z87.898 HISTORY OF DYSURIA: Status: RESOLVED | Noted: 2024-08-23 | Resolved: 2025-08-20

## 2025-08-20 PROBLEM — Z87.01 HISTORY OF PNEUMONIA: Status: RESOLVED | Noted: 2019-11-06 | Resolved: 2025-08-20

## 2025-08-20 PROBLEM — L56.8: Status: ACTIVE | Noted: 2025-08-20

## 2025-08-20 PROBLEM — Z87.438 HISTORY OF BALANITIS: Status: RESOLVED | Noted: 2024-10-01 | Resolved: 2025-08-20

## 2025-08-21 ENCOUNTER — APPOINTMENT (OUTPATIENT)
Dept: HEMATOLOGY ONCOLOGY | Facility: CLINIC | Age: 30
End: 2025-08-21

## 2025-09-09 ENCOUNTER — APPOINTMENT (OUTPATIENT)
Dept: INTERNAL MEDICINE | Facility: CLINIC | Age: 30
End: 2025-09-09
Payer: COMMERCIAL

## 2025-09-09 VITALS
HEART RATE: 67 BPM | SYSTOLIC BLOOD PRESSURE: 114 MMHG | HEIGHT: 69 IN | BODY MASS INDEX: 26.22 KG/M2 | WEIGHT: 177 LBS | DIASTOLIC BLOOD PRESSURE: 69 MMHG

## 2025-09-09 DIAGNOSIS — R22.9 LOCALIZED SWELLING, MASS AND LUMP, UNSPECIFIED: ICD-10-CM

## 2025-09-09 PROCEDURE — 99213 OFFICE O/P EST LOW 20 MIN: CPT | Mod: 25

## 2025-09-19 ENCOUNTER — RESULT REVIEW (OUTPATIENT)
Age: 30
End: 2025-09-19

## 2025-09-19 ENCOUNTER — APPOINTMENT (OUTPATIENT)
Dept: HEMATOLOGY ONCOLOGY | Facility: CLINIC | Age: 30
End: 2025-09-19
Payer: COMMERCIAL

## 2025-09-19 VITALS
WEIGHT: 177.47 LBS | OXYGEN SATURATION: 96 % | HEIGHT: 69 IN | TEMPERATURE: 97.2 F | DIASTOLIC BLOOD PRESSURE: 76 MMHG | SYSTOLIC BLOOD PRESSURE: 117 MMHG | BODY MASS INDEX: 26.29 KG/M2 | RESPIRATION RATE: 16 BRPM | HEART RATE: 70 BPM

## 2025-09-19 DIAGNOSIS — C92.10 CHRONIC MYELOID LEUKEMIA, BCR/ABL-POSITIVE, NOT HAVING ACHIEVED REMISSION: ICD-10-CM

## 2025-09-19 PROCEDURE — 99214 OFFICE O/P EST MOD 30 MIN: CPT

## 2025-09-24 LAB
ALBUMIN SERPL ELPH-MCNC: 5 G/DL
ALP BLD-CCNC: 76 U/L
ALT SERPL-CCNC: 42 U/L
ANION GAP SERPL CALC-SCNC: 12 MMOL/L
AST SERPL-CCNC: 34 U/L
BILIRUB SERPL-MCNC: 1.1 MG/DL
BUN SERPL-MCNC: 19 MG/DL
CALCIUM SERPL-MCNC: 9.7 MG/DL
CHLORIDE SERPL-SCNC: 102 MMOL/L
CO2 SERPL-SCNC: 23 MMOL/L
CREAT SERPL-MCNC: 0.87 MG/DL
EGFRCR SERPLBLD CKD-EPI 2021: 119 ML/MIN/1.73M2
GLUCOSE SERPL-MCNC: 97 MG/DL
POTASSIUM SERPL-SCNC: 4.3 MMOL/L
PROT SERPL-MCNC: 7.9 G/DL
SODIUM SERPL-SCNC: 138 MMOL/L
T(9;22)(ABL1,BCR)/CONTROL BLD/T: NORMAL